# Patient Record
Sex: FEMALE | Race: WHITE | NOT HISPANIC OR LATINO | Employment: OTHER | URBAN - METROPOLITAN AREA
[De-identification: names, ages, dates, MRNs, and addresses within clinical notes are randomized per-mention and may not be internally consistent; named-entity substitution may affect disease eponyms.]

---

## 2022-11-23 ENCOUNTER — PATIENT ROUNDING (BHMG ONLY) (OUTPATIENT)
Dept: FAMILY MEDICINE CLINIC | Facility: CLINIC | Age: 62
End: 2022-11-23

## 2022-11-23 ENCOUNTER — OFFICE VISIT (OUTPATIENT)
Dept: FAMILY MEDICINE CLINIC | Facility: CLINIC | Age: 62
End: 2022-11-23

## 2022-11-23 VITALS
WEIGHT: 195.5 LBS | TEMPERATURE: 98.5 F | SYSTOLIC BLOOD PRESSURE: 108 MMHG | DIASTOLIC BLOOD PRESSURE: 60 MMHG | HEIGHT: 67 IN | BODY MASS INDEX: 30.69 KG/M2 | OXYGEN SATURATION: 97 % | HEART RATE: 95 BPM

## 2022-11-23 DIAGNOSIS — E66.09 CLASS 1 OBESITY DUE TO EXCESS CALORIES WITH SERIOUS COMORBIDITY AND BODY MASS INDEX (BMI) OF 30.0 TO 30.9 IN ADULT: ICD-10-CM

## 2022-11-23 DIAGNOSIS — S76.311A RUPTURE OF RIGHT HAMSTRING TENDON, INITIAL ENCOUNTER: ICD-10-CM

## 2022-11-23 DIAGNOSIS — S46.012A TRAUMATIC COMPLETE TEAR OF LEFT ROTATOR CUFF, INITIAL ENCOUNTER: ICD-10-CM

## 2022-11-23 DIAGNOSIS — Z13.220 LIPID SCREENING: ICD-10-CM

## 2022-11-23 DIAGNOSIS — Z23 NEED FOR INFLUENZA VACCINATION: Primary | ICD-10-CM

## 2022-11-23 DIAGNOSIS — G43.809 OTHER MIGRAINE WITHOUT STATUS MIGRAINOSUS, NOT INTRACTABLE: ICD-10-CM

## 2022-11-23 DIAGNOSIS — G47.09 OTHER INSOMNIA: ICD-10-CM

## 2022-11-23 DIAGNOSIS — E11.9 TYPE 2 DIABETES MELLITUS WITHOUT COMPLICATION, WITHOUT LONG-TERM CURRENT USE OF INSULIN: ICD-10-CM

## 2022-11-23 DIAGNOSIS — Z72.0 TOBACCO ABUSE: ICD-10-CM

## 2022-11-23 DIAGNOSIS — S46.112A LABRAL TEAR OF LONG HEAD OF LEFT BICEPS TENDON, INITIAL ENCOUNTER: ICD-10-CM

## 2022-11-23 DIAGNOSIS — E78.2 MIXED HYPERLIPIDEMIA: ICD-10-CM

## 2022-11-23 DIAGNOSIS — K58.2 IRRITABLE BOWEL SYNDROME WITH BOTH CONSTIPATION AND DIARRHEA: ICD-10-CM

## 2022-11-23 DIAGNOSIS — K21.9 GASTROESOPHAGEAL REFLUX DISEASE WITHOUT ESOPHAGITIS: ICD-10-CM

## 2022-11-23 DIAGNOSIS — Z00.00 PREVENTATIVE HEALTH CARE: ICD-10-CM

## 2022-11-23 DIAGNOSIS — Z83.49 FAMILY HISTORY OF HYPOTHYROIDISM: ICD-10-CM

## 2022-11-23 PROBLEM — S76.319A RUPTURE OF HAMSTRING TENDON: Status: ACTIVE | Noted: 2021-10-21

## 2022-11-23 PROBLEM — E66.811 CLASS 1 OBESITY DUE TO EXCESS CALORIES WITH SERIOUS COMORBIDITY AND BODY MASS INDEX (BMI) OF 30.0 TO 30.9 IN ADULT: Status: ACTIVE | Noted: 2022-11-23

## 2022-11-23 PROCEDURE — 90471 IMMUNIZATION ADMIN: CPT | Performed by: NURSE PRACTITIONER

## 2022-11-23 PROCEDURE — 90686 IIV4 VACC NO PRSV 0.5 ML IM: CPT | Performed by: NURSE PRACTITIONER

## 2022-11-23 PROCEDURE — 99204 OFFICE O/P NEW MOD 45 MIN: CPT | Performed by: NURSE PRACTITIONER

## 2022-11-23 PROCEDURE — T1015 CLINIC SERVICE: HCPCS | Performed by: NURSE PRACTITIONER

## 2022-11-23 RX ORDER — DAPAGLIFLOZIN AND METFORMIN HYDROCHLORIDE 5; 1000 MG/1; MG/1
2 TABLET, FILM COATED, EXTENDED RELEASE ORAL
COMMUNITY
Start: 2022-07-13 | End: 2023-01-27 | Stop reason: SDUPTHER

## 2022-11-23 RX ORDER — SERTRALINE HYDROCHLORIDE 25 MG/1
25 TABLET, FILM COATED ORAL DAILY
COMMUNITY
Start: 2022-07-13 | End: 2023-02-01

## 2022-11-23 RX ORDER — GLIMEPIRIDE 2 MG/1
2 TABLET ORAL DAILY
COMMUNITY
End: 2023-01-16 | Stop reason: SDUPTHER

## 2022-11-23 RX ORDER — SEMAGLUTIDE 1.34 MG/ML
1 INJECTION, SOLUTION SUBCUTANEOUS
COMMUNITY
Start: 2022-07-13 | End: 2023-01-06 | Stop reason: SDUPTHER

## 2022-11-23 RX ORDER — TRAZODONE HYDROCHLORIDE 50 MG/1
TABLET ORAL
Qty: 14 TABLET | Refills: 0 | Status: SHIPPED | OUTPATIENT
Start: 2022-11-23 | End: 2023-02-01

## 2022-11-23 RX ORDER — CONJUGATED ESTROGENS 0.62 MG/G
CREAM VAGINAL
COMMUNITY
End: 2022-11-23

## 2022-11-23 RX ORDER — ATORVASTATIN CALCIUM 20 MG/1
20 TABLET, FILM COATED ORAL DAILY
COMMUNITY
End: 2023-03-30 | Stop reason: SDUPTHER

## 2022-11-23 RX ORDER — PANTOPRAZOLE SODIUM 40 MG/1
40 TABLET, DELAYED RELEASE ORAL DAILY
COMMUNITY
Start: 2022-07-13 | End: 2023-01-16 | Stop reason: SDUPTHER

## 2022-11-23 RX ORDER — TRAMADOL HYDROCHLORIDE 50 MG/1
50 TABLET ORAL
COMMUNITY
Start: 2022-07-13 | End: 2023-02-07 | Stop reason: SDUPTHER

## 2022-11-23 RX ORDER — LISINOPRIL 20 MG/1
20 TABLET ORAL DAILY
COMMUNITY
Start: 2022-07-13 | End: 2022-12-10 | Stop reason: SDUPTHER

## 2022-11-23 RX ORDER — PROMETHAZINE HYDROCHLORIDE 12.5 MG/1
TABLET ORAL
Qty: 60 TABLET | Refills: 2 | Status: SHIPPED | OUTPATIENT
Start: 2022-11-23

## 2022-11-23 RX ORDER — CHOLECALCIFEROL (VITAMIN D3) 25 MCG
TABLET,CHEWABLE ORAL
COMMUNITY

## 2022-11-23 NOTE — PATIENT INSTRUCTIONS
Stop Premarin  Wean off Zoloft as discussed  Bentyl 10 mg 1-4 times a day to regulate IBS  Mammogram/DEXA scan  Trazodone 50 mg 1/2 to 2 tablets as needed insomnia trial  Orthopedic referral  Fasting blood work  Quit smoking  Follow-up 6 months

## 2022-11-23 NOTE — PROGRESS NOTES
Judy Reaves is a 62 y.o. female.     History of Present Illness  62-year-old white female smoker with history of migraines, GERD, partial hysterectomy, diabetes, hyperlipidemia, insomnia, IBS who comes in today to be established as a new patient    Blood pressure 108/60 heart rate 84 with moderate systolic murmur she denies any chest pain, dyspnea, tachycardia or dizziness.  She has no history of heart disease she is aware of but her father did die of an MI in his 70s.    Patient has left rotator and bicep tears it that she is having quite a bit of pain from I am referring her to orthopedic surgery that she wants to have done after first of the year.  She also has issues with her right thigh and has a hamstring tear as well resulting from a fall.  She is can address both these issues with orthopedic surgeon    Patient does not sleep well she has tried Benadryl but it makes her too drowsy the day to try  after.  I Nemo place her on a trial of trazodone    Patient has been on Premarin cream for vaginal dryness we will try to go off of that.  I did tell her of compounding pharmacy in [place] if she wants to go back on something that is a little safer for vaginal dryness to let me know    Patient states blood sugars are normally less than 120 in the morning we will be doing fasting labs    Patient lost her  several years ago and was placed on Zoloft at that time however she does not think she needs it anymore and she is on a low-dose of 25 mg.  We are going to try to wean off the Zoloft    Patient does have IBS has either constipation or diarrhea unguinal place her on some Bentyl to see if we can get her bowels regulated better    Weight is 196 with a BMI of 30.6.  She is up-to-date on COVID vaccines and eye exams.  I am scheduling her mammogram and DEXA scan at [place].  She had a colonoscopy 3 years ago at [facility] that is due again next year in October 2023.  Patient had hysterectomy and longer does Pap  "smears          Stop Premarin  Wean off Zoloft as discussed  Bentyl 10 mg 1-4 times a day to regulate IBS  Mammogram/DEXA scan  Trazodone 50 mg 1/2 to 2 tablets as needed insomnia trial  Orthopedic referral  Fasting blood work  Quit smoking  Follow-up 6 months           The following portions of the patient's history were reviewed and updated as appropriate: allergies, current medications, past family history, past medical history, past social history, past surgical history and problem list.    Vitals:    11/23/22 0931   BP: 108/60   BP Location: Right arm   Patient Position: Sitting   Cuff Size: Adult   Pulse: 95   Temp: 98.5 °F (36.9 °C)   TempSrc: Oral   SpO2: 97%   Weight: 88.7 kg (195 lb 8 oz)   Height: 170.2 cm (67\")     Body mass index is 30.62 kg/m².    Past Medical History:   Diagnosis Date   • Arthritis    • Diabetes mellitus (HCC)    • GERD (gastroesophageal reflux disease)    • Headache    • Hyperlipidemia    • Hypertension    • Murmur      Past Surgical History:   Procedure Laterality Date   • SUBTOTAL HYSTERECTOMY       Family History   Problem Relation Age of Onset   • Hypertension Mother    • Heart disease Mother    • Diabetes Father    • No Known Problems Sister    • No Known Problems Sister    • No Known Problems Sister    • No Known Problems Brother    • No Known Problems Brother    • No Known Problems Brother    • No Known Problems Brother    • No Known Problems Son      Immunization History   Administered Date(s) Administered   • COVID-19 (PFIZER) PURPLE CAP 01/08/2021, 01/29/2021   • Covid-19 (Pfizer) Gray Cap 04/06/2022   • FluLaval/Fluzone >6mos 10/11/2017, 09/21/2018, 09/27/2019, 10/02/2020, 10/13/2021, 11/23/2022   • Influenza, Unspecified 09/27/2019, 10/02/2020, 10/13/2021   • Pneumococcal Polysaccharide (PPSV23) 12/12/2017   • Tdap 07/20/2012, 04/19/2013       No results found for any previous visit.         Review of Systems   Constitutional: Negative.    HENT: Negative.    Respiratory: " Negative.    Cardiovascular: Negative.    Gastrointestinal: Positive for constipation and diarrhea.   Genitourinary:        Vaginal dryness   Musculoskeletal:        Left shoulder and arm pain  Right upper leg pain   Skin: Negative.    Neurological: Negative.    Psychiatric/Behavioral: Negative.        Objective   Physical Exam  Constitutional:       Appearance: Normal appearance.   HENT:      Head: Normocephalic.   Cardiovascular:      Rate and Rhythm: Normal rate and regular rhythm.      Pulses: Normal pulses.      Heart sounds: Normal heart sounds.   Pulmonary:      Effort: Pulmonary effort is normal.      Breath sounds: Normal breath sounds.   Abdominal:      General: Bowel sounds are normal.   Musculoskeletal:      Comments: Limited movement in the left shoulder   Skin:     General: Skin is warm and dry.   Neurological:      General: No focal deficit present.      Mental Status: She is alert and oriented to person, place, and time.   Psychiatric:         Mood and Affect: Mood normal.         Behavior: Behavior normal.         Procedures    Assessment & Plan   Diagnoses and all orders for this visit:    1. Need for influenza vaccination (Primary)  -     FluLaval/Fluarix/Fluzone >6 Months    2. Preventative health care  -     CBC & Differential; Future  -     Comprehensive Metabolic Panel; Future  -     Hemoglobin A1c; Future    3. Lipid screening  -     Lipid Panel With LDL / HDL Ratio; Future    4. Family history of hypothyroidism  -     TSH+Free T4; Future  -     T3; Future    5. Mixed hyperlipidemia    6. Type 2 diabetes mellitus without complication, without long-term current use of insulin (HCC)    7. Gastroesophageal reflux disease without esophagitis    8. Other migraine without status migrainosus, not intractable    9. Rupture of right hamstring tendon, initial encounter  -     Ambulatory Referral to Orthopedic Surgery    10. Traumatic complete tear of left rotator cuff, initial encounter  -      Ambulatory Referral to Orthopedic Surgery    11. Labral tear of long head of left biceps tendon, initial encounter  -     Ambulatory Referral to Orthopedic Surgery    12. Tobacco abuse    13. Class 1 obesity due to excess calories with serious comorbidity and body mass index (BMI) of 30.0 to 30.9 in adult    14. Other insomnia    15. Irritable bowel syndrome with both constipation and diarrhea    Other orders  -     promethazine (PHENERGAN) 12.5 MG tablet; 1-2 q6h prn  Dispense: 60 tablet; Refill: 2  -     traZODone (DESYREL) 50 MG tablet; 1/2 - 2 tabs 30 minutes before bedtime  Dispense: 14 tablet; Refill: 0  -     glucose blood test strip; Check blood sugar tid  Dispense: 100 each; Refill: 12          Current Outpatient Medications:   •  atorvastatin (LIPITOR) 20 MG tablet, Take 20 mg by mouth Daily., Disp: , Rfl:   •  Cyanocobalamin (B-12) 1000 MCG capsule, Take  by mouth., Disp: , Rfl:   •  dapagliflozin-metformin HCl ER (Xigduo XR) 5-1000 MG tablet, Take 2 tablets by mouth Daily With Breakfast., Disp: , Rfl:   •  glimepiride (AMARYL) 2 MG tablet, Take 2 mg by mouth Daily., Disp: , Rfl:   •  lisinopril (PRINIVIL,ZESTRIL) 20 MG tablet, Take 20 mg by mouth Daily., Disp: , Rfl:   •  pantoprazole (PROTONIX) 40 MG EC tablet, Take 40 mg by mouth Daily., Disp: , Rfl:   •  Semaglutide, 1 MG/DOSE, (Ozempic, 1 MG/DOSE,) 4 MG/3ML solution pen-injector, Inject 1 mg under the skin into the appropriate area as directed Every 7 (Seven) Days., Disp: , Rfl:   •  sertraline (ZOLOFT) 25 MG tablet, Take 25 mg by mouth Daily., Disp: , Rfl:   •  traMADol (ULTRAM) 50 MG tablet, Take 50 mg by mouth. prn, Disp: , Rfl:   •  glucose blood test strip, Check blood sugar tid, Disp: 100 each, Rfl: 12  •  promethazine (PHENERGAN) 12.5 MG tablet, 1-2 q6h prn, Disp: 60 tablet, Rfl: 2  •  traZODone (DESYREL) 50 MG tablet, 1/2 - 2 tabs 30 minutes before bedtime, Disp: 14 tablet, Rfl: 0           Debbie Phillips, APRN 11/23/2022 10:29 EST  This note  has been electronically signed

## 2022-11-23 NOTE — PROGRESS NOTES
November 23, 2022    Hello, may I speak with Judy Reaves?    My name is Eli Cifuentes      I am  with CHRISTO BOYKIN  [facility]  [address] [facility] [address]  [place] [address].    Before we get started may I verify your date of birth? 1960    I am calling to officially welcome you to our practice and ask about your recent visit. Is this a good time to talk? yes    Tell me about your visit with us. What things went well?  Everything fantastic. Everyone really nice and took care of everything.       We're always looking for ways to make our patients' experiences even better. Do you have recommendations on ways we may improve?  no    Overall were you satisfied with your first visit to our practice? yes       I appreciate you taking the time to speak with me today. Is there anything else I can do for you? no      Thank you, and have a great day.

## 2022-12-01 ENCOUNTER — TELEPHONE (OUTPATIENT)
Dept: FAMILY MEDICINE CLINIC | Facility: CLINIC | Age: 62
End: 2022-12-01

## 2022-12-01 NOTE — TELEPHONE ENCOUNTER
Incoming Refill Request      Medication requested (name and dose):   glucose blood test strip   12 ordered         Pharmacy where request should be sent: [facility]/pharm[facility] #[id number] - [place], [place] - [address] - [phone]  - [phone]   [phone]    Additional details provided by patient: PATIENT CALLED AND SAID SHE IS OUT OF MEDICATION AND HAS BEEN FOR A WEEK     Best call back number: [phone]    Does the patient have less than a 3 day supply:  [x] Yes  [] No    Staci Monteiro Rep  12/01/22, 09:23 EST

## 2022-12-05 ENCOUNTER — TELEPHONE (OUTPATIENT)
Dept: FAMILY MEDICINE CLINIC | Facility: CLINIC | Age: 62
End: 2022-12-05

## 2022-12-05 RX ORDER — DIPHENHYDRAMINE HYDROCHLORIDE 25 MG/1
CAPSULE, LIQUID FILLED ORAL
Qty: 1 EACH | Refills: 0 | Status: SHIPPED | OUTPATIENT
Start: 2022-12-05

## 2022-12-05 NOTE — TELEPHONE ENCOUNTER
Caller: Judy Reaves    Relationship: Self    Best call back number: [phone]     What medication are you requesting: ACCUCHEK TEST GUIDE STRIPS    ACCUCHEK GUIDE ME METER    What are your current symptoms: DIABETES    How long have you been experiencing symptoms: ONGOING    Have you had these symptoms before:    [x] Yes  [] No    Have you been treated for these symptoms before:   [x] Yes  [] No    If a prescription is needed, what is your preferred pharmacy and phone number: [id number] - [place], [place] - [address] [facility] - [phone] [facility] [phone] FX     Additional notes: INSURANCE WOULD NOT COVER PREVIOUS TEST STRIPS, ALSO NEEDS TO METER TO GO WITH TEST STRIPS

## 2022-12-12 RX ORDER — LISINOPRIL 20 MG/1
20 TABLET ORAL DAILY
Qty: 90 TABLET | Refills: 0 | Status: SHIPPED | OUTPATIENT
Start: 2022-12-12 | End: 2023-03-06

## 2023-01-05 ENCOUNTER — OFFICE VISIT (OUTPATIENT)
Dept: ORTHOPEDIC SURGERY | Facility: CLINIC | Age: 63
End: 2023-01-05
Payer: MEDICAID

## 2023-01-05 ENCOUNTER — PREP FOR SURGERY (OUTPATIENT)
Dept: OTHER | Facility: HOSPITAL | Age: 63
End: 2023-01-05
Payer: MEDICAID

## 2023-01-05 VITALS — WEIGHT: 195 LBS | OXYGEN SATURATION: 97 % | BODY MASS INDEX: 30.61 KG/M2 | HEIGHT: 67 IN

## 2023-01-05 DIAGNOSIS — M19.012 OSTEOARTHRITIS OF LEFT GLENOHUMERAL JOINT: Primary | ICD-10-CM

## 2023-01-05 PROCEDURE — 99204 OFFICE O/P NEW MOD 45 MIN: CPT | Performed by: ORTHOPAEDIC SURGERY

## 2023-01-05 RX ORDER — TRANEXAMIC ACID 10 MG/ML
1000 INJECTION, SOLUTION INTRAVENOUS ONCE
Status: CANCELLED | OUTPATIENT
Start: 2023-01-05 | End: 2023-01-05

## 2023-01-05 RX ORDER — MELOXICAM 15 MG/1
15 TABLET ORAL ONCE
Status: CANCELLED | OUTPATIENT
Start: 2023-01-05 | End: 2023-01-05

## 2023-01-05 RX ORDER — PREGABALIN 75 MG/1
150 CAPSULE ORAL ONCE
Status: CANCELLED | OUTPATIENT
Start: 2023-01-05 | End: 2023-01-05

## 2023-01-05 RX ORDER — OXYCODONE HCL 10 MG/1
10 TABLET, FILM COATED, EXTENDED RELEASE ORAL ONCE
Status: CANCELLED | OUTPATIENT
Start: 2023-01-05 | End: 2023-01-05

## 2023-01-05 NOTE — PROGRESS NOTES
Patient ID: Judy Reaves is a 62 y.o. female.    Chief Complaint:    Chief Complaint   Patient presents with   • Left Shoulder - Initial Evaluation, Pain     Pain 5/10       HPI:  This is a 62-year-old female here with longstanding left shoulder pain from Debbie Phillips.  She has had pain deep in the shoulder problems lifting.  She has had treatment with rehab exercises and medication without significant relief  Past Medical History:   Diagnosis Date   • Arthritis    • Diabetes mellitus (HCC)    • GERD (gastroesophageal reflux disease)    • Headache    • Hyperlipidemia    • Hypertension    • Murmur        Past Surgical History:   Procedure Laterality Date   • SUBTOTAL HYSTERECTOMY         Family History   Problem Relation Age of Onset   • Hypertension Mother    • Heart disease Mother    • Diabetes Father    • No Known Problems Sister    • No Known Problems Sister    • No Known Problems Sister    • No Known Problems Brother    • No Known Problems Brother    • No Known Problems Brother    • No Known Problems Brother    • No Known Problems Son           Social History     Occupational History   • Not on file   Tobacco Use   • Smoking status: Every Day     Types: Cigarettes   • Smokeless tobacco: Never   Vaping Use   • Vaping Use: Never used   Substance and Sexual Activity   • Alcohol use: Not Currently   • Drug use: Defer   • Sexual activity: Defer      Review of Systems   Cardiovascular: Negative for chest pain.   Musculoskeletal: Positive for arthralgias.       Objective:    Ht 170.2 cm (67\")   Wt 88.5 kg (195 lb)   SpO2 97%   BMI 30.54 kg/m²     Physical Examination:  Left shoulder intact skin mild pain over the bicep groove and impingement area passive elevation 160 abduction 140 external patient 50 internal rotation L5.  She has pain and weakness on [place], [place], supraspinatus testing.  Belly press and lift off are 4/5 and 3/5.  Active elevation is 120 degrees  Sensory and motor exam are intact all  distributions. Radial pulse is palpable and capillary refill is less than two seconds to all digits.    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 Issues of scapular notching and component revision were discussed. Questions were answered and addressed.  Postop sling dispensed  Greater than 15 minutes was spent demonstrating proper fit and use of the device and signs to monitor for complications      Procedures         Disclaimer: Part of this note may be an electronic transcription/translation of spoken language to printed text using the Dragon Dictation System

## 2023-01-06 ENCOUNTER — PATIENT ROUNDING (BHMG ONLY) (OUTPATIENT)
Dept: ORTHOPEDIC SURGERY | Facility: CLINIC | Age: 63
End: 2023-01-06
Payer: MEDICAID

## 2023-01-06 ENCOUNTER — TELEPHONE (OUTPATIENT)
Dept: FAMILY MEDICINE CLINIC | Facility: CLINIC | Age: 63
End: 2023-01-06
Payer: MEDICAID

## 2023-01-06 RX ORDER — PEN NEEDLE, DIABETIC 30 GX3/16"
1 NEEDLE, DISPOSABLE MISCELLANEOUS TAKE AS DIRECTED
Qty: 30 EACH | Refills: 0 | Status: SHIPPED | OUTPATIENT
Start: 2023-01-06 | End: 2023-02-02

## 2023-01-06 RX ORDER — SEMAGLUTIDE 1.34 MG/ML
1 INJECTION, SOLUTION SUBCUTANEOUS
Qty: 3 ML | Refills: 2 | Status: SHIPPED | OUTPATIENT
Start: 2023-01-06 | End: 2023-04-06

## 2023-01-06 NOTE — TELEPHONE ENCOUNTER
----- Message from Judy Reaves sent at 1/6/2023 12:53 PM EST -----  Regarding: Ozempic  Contact: [phone]  Could you send in a prescription for the Ozempic for me   I only have one dose left but no needle left because the needle bent on me do they have a prescription for extra needles for it also  Thank you  Judy Reaves

## 2023-01-06 NOTE — PROGRESS NOTES
A my chart message has been sent to the patient for PATIENT ROUNDING with [facility]

## 2023-01-06 NOTE — PROGRESS NOTES
A my chart message has been sent to the patient for PATIENT ROUNDING with [facility]

## 2023-01-16 RX ORDER — ERGOCALCIFEROL 1.25 MG/1
50000 CAPSULE ORAL WEEKLY
Qty: 12 CAPSULE | Refills: 1 | Status: SHIPPED | OUTPATIENT
Start: 2023-01-16

## 2023-01-16 RX ORDER — GLIMEPIRIDE 4 MG/1
4 TABLET ORAL DAILY
Qty: 90 TABLET | Refills: 1 | Status: SHIPPED | OUTPATIENT
Start: 2023-01-16

## 2023-01-16 RX ORDER — ERGOCALCIFEROL 1.25 MG/1
50000 CAPSULE ORAL WEEKLY
COMMUNITY
End: 2023-01-16 | Stop reason: SDUPTHER

## 2023-01-16 RX ORDER — GLIMEPIRIDE 4 MG/1
1 TABLET ORAL DAILY
COMMUNITY
Start: 2023-01-12 | End: 2023-01-16 | Stop reason: SDUPTHER

## 2023-01-16 RX ORDER — PANTOPRAZOLE SODIUM 40 MG/1
40 TABLET, DELAYED RELEASE ORAL DAILY
Qty: 90 TABLET | Refills: 1 | Status: SHIPPED | OUTPATIENT
Start: 2023-01-16

## 2023-01-16 NOTE — TELEPHONE ENCOUNTER
----- Message from Mary Lou Lopez MA sent at 1/16/2023  7:56 AM EST -----  Regarding: FW: Ludaic  Contact: [phone]    ----- Message -----  From: Judy Koehler  Sent: 1/15/2023  12:15 PM EST  To: Domonique Cooper [facility]  Subject: Ozempic                                          I need a refill on glimepiride 4mg and ergocalciferol 1.25 mg which I have been taking once a week if you want me to continue on them and the pantoprazole 40 mg   I know we talked about discontinuing them and I tried but if I don't take at least one every other day my stomach is upset and I have heartburn and acid reflux  Thank you  Judy Koehler

## 2023-01-27 ENCOUNTER — TELEPHONE (OUTPATIENT)
Dept: FAMILY MEDICINE CLINIC | Facility: CLINIC | Age: 63
End: 2023-01-27
Payer: MEDICAID

## 2023-01-27 RX ORDER — DAPAGLIFLOZIN AND METFORMIN HYDROCHLORIDE 5; 1000 MG/1; MG/1
2 TABLET, FILM COATED, EXTENDED RELEASE ORAL
Qty: 30 TABLET | Refills: 2 | Status: SHIPPED | OUTPATIENT
Start: 2023-01-27 | End: 2023-02-01

## 2023-01-27 NOTE — TELEPHONE ENCOUNTER
----- Message from Judy Koehler sent at 1/27/2023  7:23 AM EST -----  Regarding: Marielos  Contact: [phone]  I am out of the [facility] please send in a refill for me if she wants me to stay on it  Thank you  Judy Koehler

## 2023-01-30 ENCOUNTER — TELEPHONE (OUTPATIENT)
Dept: FAMILY MEDICINE CLINIC | Facility: CLINIC | Age: 63
End: 2023-01-30
Payer: MEDICAID

## 2023-01-30 NOTE — TELEPHONE ENCOUNTER
Caller: Judy Koehler    Relationship: Self    Best call back number: [phone]      PATIENT'S INSURANCE IS REQUIRING A PRIOR AUTHORIZATION ON HER PRESCRIPTION : XIGDUO     SHE IS COMPLETELY OUT AS OF TODAY.      THE PHARMACY PROVIDED HER 3 DAYS WORTH ON THIS MEDICATION DUE TO HER NEEDING IT AUTHORIZED.      PLEASE ADVISE

## 2023-01-31 ENCOUNTER — TELEPHONE (OUTPATIENT)
Dept: FAMILY MEDICINE CLINIC | Facility: CLINIC | Age: 63
End: 2023-01-31
Payer: MEDICAID

## 2023-01-31 NOTE — TELEPHONE ENCOUNTER
Caller: Judy Koehler    Relationship: Self    Best call back number: [phone]        PATIENT CALLED STATING HER PRIOR AUTHORIZATION FOR: XIGDUO   WAS DENIED.    SHE IS REQUESTING A PEER TO PEER, PER HER INSURANCE COMPANY.    PATIENT IS COMPLETELY OUT OF THIS MEDICATION.    IF THIS CANNOT BE DONE SHE NEEDS US TO CALL HER SO SHE CAN FIGURE OUT AN ALTERNATIVE MEDICATION       PLEASE ADVISE

## 2023-02-01 ENCOUNTER — TELEPHONE (OUTPATIENT)
Dept: FAMILY MEDICINE CLINIC | Facility: CLINIC | Age: 63
End: 2023-02-01

## 2023-02-01 ENCOUNTER — OFFICE VISIT (OUTPATIENT)
Dept: FAMILY MEDICINE CLINIC | Facility: CLINIC | Age: 63
End: 2023-02-01
Payer: MEDICAID

## 2023-02-01 VITALS
SYSTOLIC BLOOD PRESSURE: 104 MMHG | WEIGHT: 190.2 LBS | HEIGHT: 67 IN | BODY MASS INDEX: 29.85 KG/M2 | DIASTOLIC BLOOD PRESSURE: 62 MMHG | OXYGEN SATURATION: 98 % | HEART RATE: 94 BPM | TEMPERATURE: 97.8 F

## 2023-02-01 DIAGNOSIS — E78.2 MIXED HYPERLIPIDEMIA: Primary | ICD-10-CM

## 2023-02-01 DIAGNOSIS — S46.112A LABRAL TEAR OF LONG HEAD OF LEFT BICEPS TENDON, INITIAL ENCOUNTER: ICD-10-CM

## 2023-02-01 DIAGNOSIS — M19.012 OSTEOARTHRITIS OF LEFT SHOULDER, UNSPECIFIED OSTEOARTHRITIS TYPE: ICD-10-CM

## 2023-02-01 DIAGNOSIS — Z78.0 POSTMENOPAUSAL: ICD-10-CM

## 2023-02-01 DIAGNOSIS — G47.09 OTHER INSOMNIA: ICD-10-CM

## 2023-02-01 DIAGNOSIS — E11.9 TYPE 2 DIABETES MELLITUS WITHOUT COMPLICATION, WITHOUT LONG-TERM CURRENT USE OF INSULIN: ICD-10-CM

## 2023-02-01 DIAGNOSIS — Z72.0 TOBACCO ABUSE: ICD-10-CM

## 2023-02-01 DIAGNOSIS — Z12.31 OTHER SCREENING MAMMOGRAM: ICD-10-CM

## 2023-02-01 PROCEDURE — T1015 CLINIC SERVICE: HCPCS | Performed by: NURSE PRACTITIONER

## 2023-02-01 PROCEDURE — 99213 OFFICE O/P EST LOW 20 MIN: CPT | Performed by: NURSE PRACTITIONER

## 2023-02-01 RX ORDER — CETIRIZINE HYDROCHLORIDE 10 MG/1
10 TABLET ORAL DAILY
Qty: 30 TABLET | Refills: 2 | Status: SHIPPED | OUTPATIENT
Start: 2023-02-01 | End: 2023-04-07

## 2023-02-01 RX ORDER — TRIAMCINOLONE ACETONIDE 55 UG/1
2 SPRAY, METERED NASAL DAILY
Qty: 16.5 G | Refills: 2 | Status: SHIPPED | OUTPATIENT
Start: 2023-02-01

## 2023-02-01 RX ORDER — HYDROXYZINE 50 MG/1
50 TABLET, FILM COATED ORAL 3 TIMES DAILY PRN
Qty: 30 TABLET | Refills: 0 | Status: SHIPPED | OUTPATIENT
Start: 2023-02-01 | End: 2023-03-30 | Stop reason: SDUPTHER

## 2023-02-01 RX ORDER — PSEUDOEPHEDRINE HCL 30 MG
30 TABLET ORAL EVERY 4 HOURS PRN
Qty: 60 TABLET | Refills: 2 | Status: SHIPPED | OUTPATIENT
Start: 2023-02-01

## 2023-02-01 NOTE — PROGRESS NOTES
Judy Koehler is a 62 y.o. female.     History of Present Illness  62-year-old white female smoker with history of migraines, GERD, partial hysterectomy, diabetes, hyperlipidemia, insomnia, IBS and chronic left shoulder pain.    Blood pressure 104/62 heart rate 94 with moderate systolic murmur she denies any chest pain, dyspnea, tachycardia or dizziness    Patient has rotator and bicep tears and has surgery scheduled for February 15 at [facility].    On last visit we tried trazodone for sleep but she states it does not work very well.  We will stop the trazodone and I will place her on a trial of hydroxyzine to see if that helps    Patient has weaned off Zoloft and is no longer on the Premarin cream and states she is feeling pretty good.    Patient sugars are staying less than 140 in the morning on current medications would not change anything there.  We will be doing some fasting labs today    Patient complains of persistent coughing at night and rhinorrhea.  On examination patient has allergies and looks like maxillary sinusitis.  I am placing her on allergy medicine and a Z-Rod    Weight is down 5 pounds at 190 since being on the Ozempic.  She is up-to-date on eye exam she has not done a mammogram and DEXA scan yet will reorder.  Her colonoscopy is due in October 2023          Z-Rod  Allergy medication as directed  Mammogram/DEXA scan at [facility]  Hydroxyzine 50 mg 1 to 230 minutes before bedtime  Fasting blood work  Keep appointment for preop labs  Follow-up postop surgery          Diabetes  She has type 2 diabetes mellitus. No MedicAlert identification noted. The initial diagnosis of diabetes was made 20 Years ago. Hypoglycemia symptoms include sleepiness. Pertinent negatives for hypoglycemia include no hunger, mood changes, nervousness/anxiousness, pallor or sweats. Associated symptoms include foot paresthesias. Pertinent negatives for diabetes include no foot ulcerations and no visual change.  "Pertinent negatives for hypoglycemia complications include no blackouts, no hospitalization, no nocturnal hypoglycemia, no required assistance and no required glucagon injection. Pertinent negatives for diabetic complications include no CVA, heart disease, impotence, nephropathy, peripheral neuropathy, PVD or retinopathy. Risk factors for coronary artery disease include dyslipidemia, family history, hypertension, obesity and tobacco exposure. Current diabetic treatment includes oral agent (triple therapy). She is compliant with treatment most of the time. She is currently taking insulin pre-breakfast. She is following a generally healthy diet. Meal planning includes avoidance of concentrated sweets. She has not had a previous visit with a dietitian. She participates in exercise intermittently. She monitors blood glucose at home 1-2 x per day. Blood glucose monitoring compliance is adequate. Her home blood glucose trend is fluctuating minimally. Her breakfast blood glucose range is generally 110-130 mg/dl. Her highest blood glucose is 140-180 mg/dl. Her overall blood glucose range is 110-130 mg/dl. She does not see a podiatrist.Eye exam is current.        The following portions of the patient's history were reviewed and updated as appropriate: allergies, current medications, past family history, past medical history, past social history, past surgical history and problem list.    Vitals:    02/01/23 0916   BP: 104/62   BP Location: Right arm   Patient Position: Sitting   Cuff Size: Adult   Pulse: 94   Temp: 97.8 °F (36.6 °C)   TempSrc: Oral   SpO2: 98%   Weight: 86.3 kg (190 lb 3.2 oz)   Height: 170.2 cm (67.01\")     Body mass index is 29.78 kg/m².    Past Medical History:   Diagnosis Date   • Arthritis    • Diabetes mellitus (HCC)    • GERD (gastroesophageal reflux disease)    • Headache    • Hyperlipidemia    • Hypertension    • Murmur      Past Surgical History:   Procedure Laterality Date   • SUBTOTAL " HYSTERECTOMY       Family History   Problem Relation Age of Onset   • Hypertension Mother    • Heart disease Mother    • Diabetes Father    • No Known Problems Sister    • No Known Problems Sister    • No Known Problems Sister    • No Known Problems Brother    • No Known Problems Brother    • No Known Problems Brother    • No Known Problems Brother    • No Known Problems Son      Immunization History   Administered Date(s) Administered   • COVID-19 (PFIZER) PURPLE CAP 01/08/2021, 01/29/2021   • Covid-19 (Pfizer) Gray Cap 04/06/2022   • FluLaval/Fluzone >6mos 10/11/2017, 09/21/2018, 09/27/2019, 10/02/2020, 10/13/2021, 11/23/2022   • Influenza, Unspecified 09/27/2019, 10/02/2020, 10/13/2021   • Pneumococcal Polysaccharide (PPSV23) 12/12/2017   • Tdap 07/20/2012, 04/19/2013       Results Encounter on 11/28/2022   Component Date Value Ref Range Status   • WBC 11/29/2022 8.9  3.4 - 10.8 x10E3/uL Final   • RBC 11/29/2022 5.56 (H)  3.77 - 5.28 x10E6/uL Final   • Hemoglobin 11/29/2022 16.0 (H)  11.1 - 15.9 g/dL Final   • Hematocrit 11/29/2022 48.2 (H)  34.0 - 46.6 % Final   • MCV 11/29/2022 87  79 - 97 fL Final   • MCH 11/29/2022 28.8  26.6 - 33.0 pg Final   • MCHC 11/29/2022 33.2  31.5 - 35.7 g/dL Final   • RDW 11/29/2022 13.2  11.7 - 15.4 % Final   • Platelets 11/29/2022 318  150 - 450 x10E3/uL Final   • Neutrophil Rel % 11/29/2022 58  Not Estab. % Final   • Lymphocyte Rel % 11/29/2022 35  Not Estab. % Final   • Monocyte Rel % 11/29/2022 5  Not Estab. % Final   • Eosinophil Rel % 11/29/2022 1  Not Estab. % Final   • Basophil Rel % 11/29/2022 1  Not Estab. % Final   • Neutrophils Absolute 11/29/2022 5.2  1.4 - 7.0 x10E3/uL Final   • Lymphocytes Absolute 11/29/2022 3.1  0.7 - 3.1 x10E3/uL Final   • Monocytes Absolute 11/29/2022 0.4  0.1 - 0.9 x10E3/uL Final   • Eosinophils Absolute 11/29/2022 0.1  0.0 - 0.4 x10E3/uL Final   • Basophils Absolute 11/29/2022 0.1  0.0 - 0.2 x10E3/uL Final   • Immature Granulocyte Rel %  11/29/2022 0  Not Estab. % Final   • Immature Grans Absolute 11/29/2022 0.0  0.0 - 0.1 x10E3/uL Final   • Glucose 11/29/2022 129 (H)  70 - 99 mg/dL Final   • BUN 11/29/2022 15  8 - 27 mg/dL Final   • Creatinine 11/29/2022 0.88  0.57 - 1.00 mg/dL Final   • EGFR Result 11/29/2022 74  >59 mL/min/1.73 Final   • BUN/Creatinine Ratio 11/29/2022 17  12 - 28 Final   • Sodium 11/29/2022 137  134 - 144 mmol/L Final   • Potassium 11/29/2022 4.8  3.5 - 5.2 mmol/L Final   • Chloride 11/29/2022 99  96 - 106 mmol/L Final   • Total CO2 11/29/2022 23  20 - 29 mmol/L Final   • Calcium 11/29/2022 9.3  8.7 - 10.3 mg/dL Final   • Total Protein 11/29/2022 6.3  6.0 - 8.5 g/dL Final   • Albumin 11/29/2022 4.1  3.8 - 4.8 g/dL Final   • Globulin 11/29/2022 2.2  1.5 - 4.5 g/dL Final   • A/G Ratio 11/29/2022 1.9  1.2 - 2.2 Final   • Total Bilirubin 11/29/2022 0.4  0.0 - 1.2 mg/dL Final   • Alkaline Phosphatase 11/29/2022 121  44 - 121 IU/L Final   • AST (SGOT) 11/29/2022 13  0 - 40 IU/L Final   • ALT (SGPT) 11/29/2022 14  0 - 32 IU/L Final   • Total Cholesterol 11/29/2022 127  100 - 199 mg/dL Final   • Triglycerides 11/29/2022 100  0 - 149 mg/dL Final   • HDL Cholesterol 11/29/2022 45  >39 mg/dL Final   • VLDL Cholesterol Tito 11/29/2022 19  5 - 40 mg/dL Final   • LDL Chol Calc (NIH) 11/29/2022 63  0 - 99 mg/dL Final   • LDL/HDL RATIO 11/29/2022 1.4  0.0 - 3.2 ratio Final    Comment:                                     LDL/HDL Ratio                                              Men  Women                                1/2 Avg.Risk  1.0    1.5                                    Avg.Risk  3.6    3.2                                 2X Avg.Risk  6.2    5.0                                 3X Avg.Risk  8.0    6.1     • Hemoglobin A1C 11/29/2022 6.3 (H)  4.8 - 5.6 % Final    Comment:          Prediabetes: 5.7 - 6.4           Diabetes: >6.4           Glycemic control for adults with diabetes: <7.0     • TSH 11/29/2022 1.980  0.450 - 4.500 uIU/mL Final    • Free T4 11/29/2022 1.30  0.82 - 1.77 ng/dL Final   • T3, Total 11/29/2022 131  71 - 180 ng/dL Final         Review of Systems   Constitutional: Negative.    Respiratory: Negative.    Cardiovascular: Negative.    Gastrointestinal: Negative.    Genitourinary: Negative.  Negative for impotence.   Musculoskeletal:        Shoulder pain   Skin: Negative for pallor.   Neurological: Negative.    Psychiatric/Behavioral: Positive for sleep disturbance. The patient is not nervous/anxious.        Objective   Physical Exam  Constitutional:       Appearance: Normal appearance.   HENT:      Head: Normocephalic.   Cardiovascular:      Rate and Rhythm: Normal rate and regular rhythm.      Pulses: Normal pulses.      Heart sounds: Normal heart sounds.   Pulmonary:      Effort: Pulmonary effort is normal.      Breath sounds: Normal breath sounds.   Abdominal:      General: Bowel sounds are normal.   Musculoskeletal:      Comments: Limited range of motion on shoulder until surgery in 2 weeks   Skin:     General: Skin is warm and dry.   Neurological:      General: No focal deficit present.      Mental Status: She is alert and oriented to person, place, and time.   Psychiatric:         Mood and Affect: Mood normal.         Behavior: Behavior normal.         Procedures    Assessment & Plan   Diagnoses and all orders for this visit:    1. Mixed hyperlipidemia (Primary)  -     Lipid Panel With LDL / HDL Ratio    2. Type 2 diabetes mellitus without complication, without long-term current use of insulin (HCC)  -     Comprehensive Metabolic Panel    3. Postmenopausal  -     DEXA Bone Density Axial; Future    4. Other screening mammogram  -     Mammo Screening Digital Tomosynthesis Bilateral With CAD; Future    5. Labral tear of long head of left biceps tendon, initial encounter    6. Osteoarthritis of left shoulder, unspecified osteoarthritis type    7. Tobacco abuse    8. Other insomnia    Other orders  -     hydrOXYzine (ATARAX) 50 MG  tablet; Take 1 tablet by mouth 3 (Three) Times a Day As Needed for Itching.  Dispense: 30 tablet; Refill: 0  -     cetirizine (ZyrTEC Allergy) 10 MG tablet; Take 1 tablet by mouth Daily.  Dispense: 30 tablet; Refill: 2  -     pseudoephedrine (Sudafed) 30 MG tablet; Take 1 tablet by mouth Every 4 (Four) Hours As Needed for Congestion.  Dispense: 60 tablet; Refill: 2  -     Triamcinolone Acetonide (Nasacort Allergy 24HR) 55 MCG/ACT nasal inhaler; 2 sprays into the nostril(s) as directed by provider Daily.  Dispense: 16.5 g; Refill: 2          Current Outpatient Medications:   •  atorvastatin (LIPITOR) 20 MG tablet, Take 20 mg by mouth Daily., Disp: , Rfl:   •  Blood Glucose Monitoring Suppl (Blood Glucose Monitor System) w/Device kit, Check blood sugars tid, Disp: 1 each, Rfl: 0  •  Cyanocobalamin (B-12) 1000 MCG capsule, Take  by mouth., Disp: , Rfl:   •  glimepiride (AMARYL) 4 MG tablet, Take 1 tablet by mouth Daily., Disp: 90 tablet, Rfl: 1  •  glucose blood test strip, Check blood sugar tid, Disp: 100 each, Rfl: 12  •  Insulin Pen Needle (Pen Needles) 31G X 5 MM misc, 1 each Take As Directed., Disp: 30 each, Rfl: 0  •  lisinopril (PRINIVIL,ZESTRIL) 20 MG tablet, Take 1 tablet by mouth Daily., Disp: 90 tablet, Rfl: 0  •  pantoprazole (PROTONIX) 40 MG EC tablet, Take 1 tablet by mouth Daily., Disp: 90 tablet, Rfl: 1  •  promethazine (PHENERGAN) 12.5 MG tablet, 1-2 q6h prn, Disp: 60 tablet, Rfl: 2  •  Semaglutide, 1 MG/DOSE, (Ozempic, 1 MG/DOSE,) 4 MG/3ML solution pen-injector, Inject 1 mg under the skin into the appropriate area as directed Every 7 (Seven) Days., Disp: 3 mL, Rfl: 2  •  traMADol (ULTRAM) 50 MG tablet, Take 50 mg by mouth. prn, Disp: , Rfl:   •  vitamin D (ERGOCALCIFEROL) 1.25 MG (34370 UT) capsule capsule, Take 1 capsule by mouth 1 (One) Time Per Week., Disp: 12 capsule, Rfl: 1  •  cetirizine (ZyrTEC Allergy) 10 MG tablet, Take 1 tablet by mouth Daily., Disp: 30 tablet, Rfl: 2  •  hydrOXYzine  (ATARAX) 50 MG tablet, Take 1 tablet by mouth 3 (Three) Times a Day As Needed for Itching., Disp: 30 tablet, Rfl: 0  •  pseudoephedrine (Sudafed) 30 MG tablet, Take 1 tablet by mouth Every 4 (Four) Hours As Needed for Congestion., Disp: 60 tablet, Rfl: 2  •  Triamcinolone Acetonide (Nasacort Allergy 24HR) 55 MCG/ACT nasal inhaler, 2 sprays into the nostril(s) as directed by provider Daily., Disp: 16.5 g, Rfl: 2           Debbie Phillips, APRN 2/1/2023 09:50 EST  This note has been electronically signed

## 2023-02-01 NOTE — TELEPHONE ENCOUNTER
Pt was supposed to have a z pack sent in today following her appt.  I do not see where it has been sent.  Please advise.

## 2023-02-01 NOTE — PATIENT INSTRUCTIONS
Z-Rod  Allergy medication as directed  Mammogram/DEXA scan at [facility]  Hydroxyzine 50 mg 1 to 230 minutes before bedtime  Fasting blood work  Keep appointment for preop labs  Follow-up postop surgery

## 2023-02-02 LAB
ALBUMIN SERPL-MCNC: 4.2 G/DL (ref 3.8–4.8)
ALBUMIN/GLOB SERPL: 1.6 {RATIO} (ref 1.2–2.2)
ALP SERPL-CCNC: 121 IU/L (ref 44–121)
ALT SERPL-CCNC: 13 IU/L (ref 0–32)
AST SERPL-CCNC: 16 IU/L (ref 0–40)
BILIRUB SERPL-MCNC: 0.4 MG/DL (ref 0–1.2)
BUN SERPL-MCNC: 16 MG/DL (ref 8–27)
BUN/CREAT SERPL: 15 (ref 12–28)
CALCIUM SERPL-MCNC: 9.4 MG/DL (ref 8.7–10.3)
CHLORIDE SERPL-SCNC: 101 MMOL/L (ref 96–106)
CHOLEST SERPL-MCNC: 132 MG/DL (ref 100–199)
CO2 SERPL-SCNC: 25 MMOL/L (ref 20–29)
CREAT SERPL-MCNC: 1.08 MG/DL (ref 0.57–1)
EGFRCR SERPLBLD CKD-EPI 2021: 58 ML/MIN/1.73
GLOBULIN SER CALC-MCNC: 2.7 G/DL (ref 1.5–4.5)
GLUCOSE SERPL-MCNC: 126 MG/DL (ref 70–99)
HDLC SERPL-MCNC: 47 MG/DL
LDLC SERPL CALC-MCNC: 67 MG/DL (ref 0–99)
LDLC/HDLC SERPL: 1.4 RATIO (ref 0–3.2)
POTASSIUM SERPL-SCNC: 5 MMOL/L (ref 3.5–5.2)
PROT SERPL-MCNC: 6.9 G/DL (ref 6–8.5)
SODIUM SERPL-SCNC: 139 MMOL/L (ref 134–144)
TRIGL SERPL-MCNC: 95 MG/DL (ref 0–149)
VLDLC SERPL CALC-MCNC: 18 MG/DL (ref 5–40)

## 2023-02-02 RX ORDER — FLURBIPROFEN SODIUM 0.3 MG/ML
SOLUTION/ DROPS OPHTHALMIC
Qty: 100 EACH | Refills: 6 | Status: SHIPPED | OUTPATIENT
Start: 2023-02-02

## 2023-02-02 RX ORDER — AZITHROMYCIN 250 MG/1
TABLET, FILM COATED ORAL
Qty: 6 TABLET | Refills: 0 | Status: ON HOLD | OUTPATIENT
Start: 2023-02-02 | End: 2023-02-15

## 2023-02-06 ENCOUNTER — TELEPHONE (OUTPATIENT)
Dept: FAMILY MEDICINE CLINIC | Facility: CLINIC | Age: 63
End: 2023-02-06
Payer: MEDICAID

## 2023-02-06 NOTE — TELEPHONE ENCOUNTER
Provider: SALINAS, SHARON    Caller: HARIS LAGUNA     Relationship to Patient: SELF    Phone Number: [phone]    Reason for Call: PATIENT WAS WONDERING IF SHE SHOULD GET BACK ON THE XIGDUO MEDICATION BECAUSE  THE INSURANCE HAS NOW APPROVED THE REFILL.    PLEASE CALL PATIENT TO DISCUSS AND ADVISE

## 2023-02-07 ENCOUNTER — HOSPITAL ENCOUNTER (OUTPATIENT)
Dept: CARDIOLOGY | Facility: HOSPITAL | Age: 63
Discharge: HOME | End: 2023-02-07
Payer: MEDICAID

## 2023-02-07 ENCOUNTER — LAB (OUTPATIENT)
Dept: LAB | Facility: HOSPITAL | Age: 63
End: 2023-02-07
Payer: MEDICAID

## 2023-02-07 ENCOUNTER — HOSPITAL ENCOUNTER (OUTPATIENT)
Dept: GENERAL RADIOLOGY | Facility: HOSPITAL | Age: 63
Discharge: HOME | End: 2023-02-07
Payer: MEDICAID

## 2023-02-07 ENCOUNTER — PRE-ADMISSION TESTING (OUTPATIENT)
Dept: PREADMISSION TESTING | Facility: HOSPITAL | Age: 63
End: 2023-02-07
Payer: MEDICAID

## 2023-02-07 VITALS
SYSTOLIC BLOOD PRESSURE: 109 MMHG | WEIGHT: 190 LBS | TEMPERATURE: 98.5 F | DIASTOLIC BLOOD PRESSURE: 75 MMHG | HEIGHT: 65 IN | HEART RATE: 93 BPM | OXYGEN SATURATION: 98 % | RESPIRATION RATE: 20 BRPM | BODY MASS INDEX: 31.65 KG/M2

## 2023-02-07 DIAGNOSIS — M19.012 OSTEOARTHRITIS OF LEFT GLENOHUMERAL JOINT: ICD-10-CM

## 2023-02-07 LAB
ABO GROUP BLD: NORMAL
ANION GAP SERPL CALCULATED.3IONS-SCNC: 8.9 MMOL/L (ref 5–15)
APTT PPP: 29.9 SECONDS (ref 24–31)
BASOPHILS # BLD AUTO: 0.04 10*3/MM3 (ref 0–0.2)
BASOPHILS NFR BLD AUTO: 0.4 % (ref 0–1.5)
BLD GP AB SCN SERPL QL: NEGATIVE
BUN SERPL-MCNC: 19 MG/DL (ref 8–23)
BUN/CREAT SERPL: 21.8 (ref 7–25)
CALCIUM SPEC-SCNC: 9.5 MG/DL (ref 8.6–10.5)
CHLORIDE SERPL-SCNC: 103 MMOL/L (ref 98–107)
CO2 SERPL-SCNC: 26.1 MMOL/L (ref 22–29)
CREAT SERPL-MCNC: 0.87 MG/DL (ref 0.57–1)
DEPRECATED RDW RBC AUTO: 40 FL (ref 37–54)
EGFRCR SERPLBLD CKD-EPI 2021: 75.4 ML/MIN/1.73
EOSINOPHIL # BLD AUTO: 0.1 10*3/MM3 (ref 0–0.4)
EOSINOPHIL NFR BLD AUTO: 1.1 % (ref 0.3–6.2)
ERYTHROCYTE [DISTWIDTH] IN BLOOD BY AUTOMATED COUNT: 12.8 % (ref 12.3–15.4)
GLUCOSE SERPL-MCNC: 124 MG/DL (ref 65–99)
HBA1C MFR BLD: 6.3 % (ref 3.5–5.6)
HCT VFR BLD AUTO: 47.3 % (ref 34–46.6)
HGB BLD-MCNC: 15.7 G/DL (ref 12–15.9)
IMM GRANULOCYTES # BLD AUTO: 0.01 10*3/MM3 (ref 0–0.05)
IMM GRANULOCYTES NFR BLD AUTO: 0.1 % (ref 0–0.5)
INR PPP: 1 (ref 0.93–1.1)
LYMPHOCYTES # BLD AUTO: 3.47 10*3/MM3 (ref 0.7–3.1)
LYMPHOCYTES NFR BLD AUTO: 38.3 % (ref 19.6–45.3)
MCH RBC QN AUTO: 28 PG (ref 26.6–33)
MCHC RBC AUTO-ENTMCNC: 33.2 G/DL (ref 31.5–35.7)
MCV RBC AUTO: 84.3 FL (ref 79–97)
MONOCYTES # BLD AUTO: 0.42 10*3/MM3 (ref 0.1–0.9)
MONOCYTES NFR BLD AUTO: 4.6 % (ref 5–12)
MRSA DNA SPEC QL NAA+PROBE: NORMAL
NEUTROPHILS NFR BLD AUTO: 5.02 10*3/MM3 (ref 1.7–7)
NEUTROPHILS NFR BLD AUTO: 55.5 % (ref 42.7–76)
NRBC BLD AUTO-RTO: 0 /100 WBC (ref 0–0.2)
PLATELET # BLD AUTO: 322 10*3/MM3 (ref 140–450)
PMV BLD AUTO: 10 FL (ref 6–12)
POTASSIUM SERPL-SCNC: 4.6 MMOL/L (ref 3.5–5.2)
PROTHROMBIN TIME: 10.3 SECONDS (ref 9.6–11.7)
RBC # BLD AUTO: 5.61 10*6/MM3 (ref 3.77–5.28)
RH BLD: NEGATIVE
SODIUM SERPL-SCNC: 138 MMOL/L (ref 136–145)
T&S EXPIRATION DATE: NORMAL
WBC NRBC COR # BLD: 9.06 10*3/MM3 (ref 3.4–10.8)

## 2023-02-07 PROCEDURE — 86900 BLOOD TYPING SEROLOGIC ABO: CPT

## 2023-02-07 PROCEDURE — 86901 BLOOD TYPING SEROLOGIC RH(D): CPT

## 2023-02-07 PROCEDURE — 36415 COLL VENOUS BLD VENIPUNCTURE: CPT

## 2023-02-07 PROCEDURE — 85025 COMPLETE CBC W/AUTO DIFF WBC: CPT

## 2023-02-07 PROCEDURE — 85610 PROTHROMBIN TIME: CPT

## 2023-02-07 PROCEDURE — 93010 ELECTROCARDIOGRAM REPORT: CPT | Performed by: INTERNAL MEDICINE

## 2023-02-07 PROCEDURE — 85730 THROMBOPLASTIN TIME PARTIAL: CPT

## 2023-02-07 PROCEDURE — 93005 ELECTROCARDIOGRAM TRACING: CPT | Performed by: ORTHOPAEDIC SURGERY

## 2023-02-07 PROCEDURE — 80048 BASIC METABOLIC PNL TOTAL CA: CPT

## 2023-02-07 PROCEDURE — 83036 HEMOGLOBIN GLYCOSYLATED A1C: CPT

## 2023-02-07 PROCEDURE — 86850 RBC ANTIBODY SCREEN: CPT

## 2023-02-07 PROCEDURE — 87641 MR-STAPH DNA AMP PROBE: CPT

## 2023-02-07 PROCEDURE — 71046 X-RAY EXAM CHEST 2 VIEWS: CPT

## 2023-02-07 PROCEDURE — 97165 OT EVAL LOW COMPLEX 30 MIN: CPT

## 2023-02-07 RX ORDER — ACETAMINOPHEN 325 MG/1
650 TABLET ORAL EVERY 6 HOURS PRN
COMMUNITY

## 2023-02-07 RX ORDER — CYCLOBENZAPRINE HCL 10 MG
10 TABLET ORAL 3 TIMES DAILY PRN
COMMUNITY
End: 2023-03-24 | Stop reason: SDUPTHER

## 2023-02-07 RX ORDER — TRAMADOL HYDROCHLORIDE 50 MG/1
50 TABLET ORAL EVERY 6 HOURS PRN
Qty: 120 TABLET | Refills: 1 | Status: SHIPPED | OUTPATIENT
Start: 2023-02-07

## 2023-02-07 NOTE — THERAPY EVALUATION
Patient Name: Judy Koehler  : 1960    MRN: [id number]                              Today's Date: 2023       Admit Date: (Not on file)    Visit Dx: No diagnosis found.  Patient Active Problem List   Diagnosis   • Gastroesophageal reflux disease   • Hypertension   • Migraine   • Mixed hyperlipidemia   • Plantar fasciitis of left foot   • Rupture of hamstring tendon   • Type 2 diabetes mellitus without complication, without long-term current use of insulin (HCC)   • Traumatic complete tear of left rotator cuff   • Labral tear of long head of left biceps tendon   • Tobacco abuse   • Other insomnia   • Irritable bowel syndrome with both constipation and diarrhea   • DJD of left shoulder     Past Medical History:   Diagnosis Date   • Arthritis    • Diabetes mellitus (HCC)    • GERD (gastroesophageal reflux disease)    • Headache    • Histoplasmosis     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   • Hyperlipidemia    • Hypertension    • Murmur    • Seasonal allergies      Past Surgical History:   Procedure Laterality Date   • LUNG BIOPSY     • SUBTOTAL HYSTERECTOMY     • TONSILLECTOMY        General Information     Row Name 23 1343          OT Time and Intention    Document Type evaluation  -SR     Row Name 23 1343          Occupational Profile    Reason for Services/Referral (Occupational Profile) Pt is being evaluated prepop for L TSA on 2/15/23.  She is typically independent and lives at home with her spouse and is typically independent with ADLs.  She has difficulty walking long distances, though does not use AD.   will be able to be home and assist with ADLs as needed.  -SR     Row Name 23 1343          Living Environment    People in Home spouse  -SR           User Key  (r) = Recorded By, (t) = Taken By, (c) = Cosigned By    Initials Name Provider Type    SR Jenifer Hughes OT Occupational Therapist                 Mobility/ADL's     Row Name 23 1400          Transfers    Transfers sit-stand  transfer  -SR     Row Name 02/07/23 1409          Sit-Stand Transfer    Sit-Stand Blanch (Transfers) independent  -SR     Row Name 02/07/23 1409          Functional Mobility    Functional Mobility- Ind. Level independent  -SR     Row Name 02/07/23 1409          Activities of Daily Living    BADL Assessment/Intervention lower body dressing;upper body dressing  -SR     Row Name 02/07/23 1409          Lower Body Dressing Assessment/Training    Comment, (Lower Body Dressing) Pt states she always wears slip on shoes.  She was educated on modificatin for edgardo dressing technique for post surgery.  -SR     Row Name 02/07/23 1409          Upper Body Dressing Assessment/Training    Comment, (Upper Body Dressing) Pt required max assist to don sling.  She was provided with handout with pictures with instructions for sling and ice pack.  -SR           User Key  (r) = Recorded By, (t) = Taken By, (c) = Cosigned By    Initials Name Provider Type    Jenifer Dasilva OT Occupational Therapist               Obj/Interventions     Row Name 02/07/23 1421          Range of Motion Comprehensive    Comment, General Range of Motion RUE WFL, L shoulder flexion 90 degrees.  -SR     Row Name 02/07/23 1421          Strength Comprehensive (MMT)    Comment, General Manual Muscle Testing (MMT) Assessment RUE WFL, L shoulder 2+/5, L elbow 3+/5, L  4-/5  -SR     Row Name 02/07/23 1421          Balance    Balance Interventions sitting;standing;sit to stand;supported;static;dynamic;minimal challenge  -SR           User Key  (r) = Recorded By, (t) = Taken By, (c) = Cosigned By    Initials Name Provider Type    Jenifer Dasilva OT Occupational Therapist               Goals/Plan    No documentation.                Clinical Impression     Row Name 02/07/23 1424          Pain Assessment    Pretreatment Pain Rating 0/10 - no pain  -SR     Posttreatment Pain Rating 0/10 - no pain  -SR     Row Name 02/07/23 1427          Plan of  Care Review    Outcome Evaluation Pt is being evaluated prepop for L TSA on 2/15/23. She is typically independent and lives at home with her spouse and is typically independent with ADLs. She has difficulty walking long distances, though does not use AD.  will be able to be home and assist with ADLs as needed.  Pt demos limited ROM and strength in L shoulder.  She was educated on use of sling and ice pack and modifications for dressing and toileting.  She is receptive to all education.  Anticipate she will progress well after surgery.  -SR     Row Name 02/07/23 1427          Therapy Assessment/Plan (OT)    Therapy Frequency (OT) evaluation only  -SR     Row Name 02/07/23 1427          Therapy Plan Review/Discharge Plan (OT)    Anticipated Discharge Disposition (OT) home with assist  -           User Key  (r) = Recorded By, (t) = Taken By, (c) = Cosigned By    Initials Name Provider Type    SR Jenifer Hughes OT Occupational Therapist               Outcome Measures    No documentation.                 Occupational Therapy Education     Title: PT OT SLP Therapies (Done)     Topic: Occupational Therapy (Done)     Point: ADL training (Done)     Description:   Instruct learner(s) on proper safety adaptation and remediation techniques during self care or transfers.   Instruct in proper use of assistive devices.              Learning Progress Summary           Patient Acceptance, E,TB, VU by  at 2/7/2023 1437                               User Key     Initials Effective Dates Name Provider Type Discipline     06/16/21 -  Jenifer Hughes OT Occupational Therapist OT              OT Recommendation and Plan  Therapy Frequency (OT): evaluation only  Plan of Care Review  Outcome Evaluation: Pt is being evaluated prepop for L TSA on 2/15/23. She is typically independent and lives at home with her spouse and is typically independent with ADLs. She has difficulty walking long distances, though does not  use AD.  will be able to be home and assist with ADLs as needed.  Pt demos limited ROM and strength in L shoulder.  She was educated on use of sling and ice pack and modifications for dressing and toileting.  She is receptive to all education.  Anticipate she will progress well after surgery.     Time Calculation:    Time Calculation- OT     Row Name 02/07/23 1437             Time Calculation- OT    OT Start Time 1305  -SR      OT Stop Time 1325  -SR      OT Time Calculation (min) 20 min  -SR      Total Timed Code Minutes- OT 0 minute(s)  -SR      OT Received On 02/07/23  -SR            User Key  (r) = Recorded By, (t) = Taken By, (c) = Cosigned By    Initials Name Provider Type    SR Jenifer Hughes OT Occupational Therapist              Therapy Charges for Today     Code Description Service Date Service Provider Modifiers Qty    [id number]  OT EVAL LOW COMPLEXITY 4 2/7/2023 Jenifer Hughes OT GO 1               Jenifer Hughes OT  2/7/2023

## 2023-02-07 NOTE — PLAN OF CARE
Goal Outcome Evaluation:              Outcome Evaluation: Pt is being evaluated prepop for L TSA on 2/15/23. She is typically independent and lives at home with her spouse and is typically independent with ADLs. She has difficulty walking long distances, though does not use AD.  will be able to be home and assist with ADLs as needed.  Pt demos limited ROM and strength in L shoulder.  She was educated on use of sling and ice pack and modifications for dressing and toileting.  She is receptive to all education.  Anticipate she will progress well after surgery.

## 2023-02-10 LAB — QT INTERVAL: 356 MS

## 2023-02-10 NOTE — DISCHARGE PLACEMENT REQUEST
"Judy Laguna (62 y.o. Female)     Date of Birth   1960    Social Security Number       Address   [address] [place] [address]    Home Phone   [phone]    MRN   [id number]       Buddhist   Oriental orthodox    Marital Status                               Admission Date       Admission Type   Elective    Admitting Provider   Willian Gomez MD    Attending Provider   Willian Gomez MD    Department, Room/Bed   [facility] MAIN OR, --/--       Discharge Date       Discharge Disposition       Discharge Destination                               Attending Provider: Willian Gomez MD    Allergies: No Known Allergies    Isolation: None   Infection: None   Code Status: Not on file    Ht: 165.1 cm (65\")   Wt: 86.2 kg (190 lb)    Admission Cmt: None   Principal Problem: DJD of left shoulder [M19.012]                 Active Insurance as of 2/15/2023     Primary Coverage     Payor Plan Insurance Group Employer/Plan Group    [id number]     Payor Plan Address Payor Plan Phone Number Payor Plan Fax Number Effective Dates    MAIL STOP:   9/1/2022 - None Entered    [address] [address]       [facility] [address]       Subscriber Name Subscriber Birth Date Member ID       JUDY LAGUNA 1960 [id number]                 Emergency Contacts      (Rel.) Home Phone Work Phone Mobile Phone    TARYN LAGUNA (Spouse) [phone] -- --              "

## 2023-02-13 NOTE — H&P
[facility]   HISTORY AND PHYSICAL    Patient Name: Judy Koehler  : 1960  MRN: [id number]  Primary Care Physician:  Debbie Phillips APRN  Date of admission: (Not on file)    Subjective   Subjective     Chief Complaint: Left shoulder pain    This is a 62-year-old female with longstanding left shoulder pain presenting for shoulder arthroplasty        Review of Systems   Cardiovascular: Negative for chest pain.   Musculoskeletal: Positive for arthralgias.        Personal History     Past Medical History:   Diagnosis Date   • Arthritis    • Diabetes mellitus (HCC)    • GERD (gastroesophageal reflux disease)    • Headache    • Histoplasmosis     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   • Hyperlipidemia    • Hypertension    • Murmur    • Seasonal allergies        Past Surgical History:   Procedure Laterality Date   • LUNG BIOPSY     • SUBTOTAL HYSTERECTOMY     • TONSILLECTOMY         Family History: family history includes Diabetes in her father; Heart disease in her mother; Hypertension in her mother; No Known Problems in her brother, brother, brother, brother, sister, sister, sister, and son. Otherwise pertinent FHx was reviewed and not pertinent to current issue.    Social History:  reports that she has been smoking cigarettes. She has never used smokeless tobacco. She reports that she does not currently use alcohol. Drug use questions deferred to the physician.    Home Medications:  B-12, Blood Glucose Monitor System, Insulin Pen Needle, Semaglutide (1 MG/DOSE), Triamcinolone Acetonide, acetaminophen, atorvastatin, azithromycin, cetirizine, cyclobenzaprine, diphenhydrAMINE HCl, glimepiride, glucose blood, hydrOXYzine, lisinopril, pantoprazole, promethazine, pseudoephedrine, traMADol, and vitamin D    Allergies:  No Known Allergies    Objective    Objective     Vitals:      Left shoulder intact skin mild pain over the bicep groove and impingement area passive elevation 160 abduction 140 external patient 50 internal rotation L5.   She has pain and weakness on Speed, [place], supraspinatus testing.  Belly press and lift off are 4/5 and 3/5.  Active elevation is 120 degrees  Sensory and motor exam are intact all distributions. Radial pulse is palpable and capillary refill is less than two seconds to all digits.     Imaging:  Previous x-ray and MRI reviewed demonstrate mild to moderate glenohumeral arthritis with massive irreparable supraspinatus and infraspinatus tears with atrophy s     Assessment:  left shoulder degenerative disease with cuff tear     Plan:  Further conservative or surgical options were discussed she would like to proceed with left reverse total shoulder arthroplasty  Discussed the risks including bleeding, scar, infection, stiffness, nerve, artery, vein and tendon damage, instability, fracture, DVT, loss of life or limb. Issues of scapular notching and component revision were discussed. Questions were answered and addressed.  Postop sling dispensed  Greater than 15 minutes was spent demonstrating proper fit and use of the device and signs to monitor for complications    Willian Gomez MD

## 2023-02-15 ENCOUNTER — HOSPITAL ENCOUNTER (OUTPATIENT)
Facility: HOSPITAL | Age: 63
Discharge: HOME | End: 2023-02-16
Attending: ORTHOPAEDIC SURGERY | Admitting: ORTHOPAEDIC SURGERY
Payer: MEDICAID

## 2023-02-15 ENCOUNTER — ANESTHESIA EVENT (OUTPATIENT)
Dept: PERIOP | Facility: HOSPITAL | Age: 63
End: 2023-02-15
Payer: MEDICAID

## 2023-02-15 ENCOUNTER — APPOINTMENT (OUTPATIENT)
Dept: GENERAL RADIOLOGY | Facility: HOSPITAL | Age: 63
End: 2023-02-15
Payer: MEDICAID

## 2023-02-15 ENCOUNTER — ANESTHESIA (OUTPATIENT)
Dept: PERIOP | Facility: HOSPITAL | Age: 63
End: 2023-02-15
Payer: MEDICAID

## 2023-02-15 DIAGNOSIS — M19.012 OSTEOARTHRITIS OF LEFT GLENOHUMERAL JOINT: ICD-10-CM

## 2023-02-15 DIAGNOSIS — M19.012 PRIMARY OSTEOARTHRITIS OF LEFT SHOULDER: Primary | ICD-10-CM

## 2023-02-15 LAB
GLUCOSE BLDC GLUCOMTR-MCNC: 113 MG/DL (ref 70–105)
GLUCOSE BLDC GLUCOMTR-MCNC: 152 MG/DL (ref 70–105)
GLUCOSE BLDC GLUCOMTR-MCNC: 370 MG/DL (ref 70–105)
GLUCOSE BLDC GLUCOMTR-MCNC: 380 MG/DL (ref 70–105)
HCT VFR BLD AUTO: 40.7 % (ref 34–46.6)
HGB BLD-MCNC: 13.6 G/DL (ref 12–15.9)

## 2023-02-15 PROCEDURE — 97168 OT RE-EVAL EST PLAN CARE: CPT

## 2023-02-15 PROCEDURE — 85018 HEMOGLOBIN: CPT | Performed by: ORTHOPAEDIC SURGERY

## 2023-02-15 PROCEDURE — C9290 INJ, BUPIVACAINE LIPOSOME: HCPCS | Performed by: ANESTHESIOLOGY

## 2023-02-15 PROCEDURE — 25010000002 FENTANYL CITRATE (PF) 50 MCG/ML SOLUTION: Performed by: ANESTHESIOLOGY

## 2023-02-15 PROCEDURE — 0 BUPIVACAINE LIPOSOME 1.3 % SUSPENSION: Performed by: ANESTHESIOLOGY

## 2023-02-15 PROCEDURE — 97535 SELF CARE MNGMENT TRAINING: CPT

## 2023-02-15 PROCEDURE — 25010000002 PROPOFOL 1000 MG/100ML EMULSION: Performed by: NURSE ANESTHETIST, CERTIFIED REGISTERED

## 2023-02-15 PROCEDURE — G0378 HOSPITAL OBSERVATION PER HR: HCPCS

## 2023-02-15 PROCEDURE — 25010000002 VANCOMYCIN 1 G RECONSTITUTED SOLUTION 1 EACH VIAL: Performed by: ORTHOPAEDIC SURGERY

## 2023-02-15 PROCEDURE — 25010000002 KETOROLAC TROMETHAMINE PER 15 MG: Performed by: NURSE ANESTHETIST, CERTIFIED REGISTERED

## 2023-02-15 PROCEDURE — 25010000002 PHENYLEPHRINE 10 MG/ML SOLUTION 5 ML VIAL: Performed by: NURSE ANESTHETIST, CERTIFIED REGISTERED

## 2023-02-15 PROCEDURE — 80048 BASIC METABOLIC PNL TOTAL CA: CPT | Performed by: ORTHOPAEDIC SURGERY

## 2023-02-15 PROCEDURE — 25010000002 DEXAMETHASONE PER 1 MG: Performed by: ANESTHESIOLOGY

## 2023-02-15 PROCEDURE — 82962 GLUCOSE BLOOD TEST: CPT

## 2023-02-15 PROCEDURE — 25010000002 MIDAZOLAM PER 1 MG: Performed by: ANESTHESIOLOGY

## 2023-02-15 PROCEDURE — 25010000002 CEFAZOLIN PER 500 MG: Performed by: ORTHOPAEDIC SURGERY

## 2023-02-15 PROCEDURE — 85014 HEMATOCRIT: CPT | Performed by: ORTHOPAEDIC SURGERY

## 2023-02-15 PROCEDURE — C1776 JOINT DEVICE (IMPLANTABLE): HCPCS | Performed by: ORTHOPAEDIC SURGERY

## 2023-02-15 PROCEDURE — 25010000002 PROPOFOL 10 MG/ML EMULSION: Performed by: NURSE ANESTHETIST, CERTIFIED REGISTERED

## 2023-02-15 PROCEDURE — 23472 RECONSTRUCT SHOULDER JOINT: CPT | Performed by: PHYSICIAN ASSISTANT

## 2023-02-15 PROCEDURE — 23472 RECONSTRUCT SHOULDER JOINT: CPT | Performed by: ORTHOPAEDIC SURGERY

## 2023-02-15 PROCEDURE — 63710000001 INSULIN LISPRO (HUMAN) PER 5 UNITS: Performed by: HOSPITALIST

## 2023-02-15 PROCEDURE — 73030 X-RAY EXAM OF SHOULDER: CPT

## 2023-02-15 PROCEDURE — C1713 ANCHOR/SCREW BN/BN,TIS/BN: HCPCS | Performed by: ORTHOPAEDIC SURGERY

## 2023-02-15 PROCEDURE — 25010000002 SUCCINYLCHOLINE PER 20 MG: Performed by: NURSE ANESTHETIST, CERTIFIED REGISTERED

## 2023-02-15 DEVICE — INSRT SOCKT HUM/SHLDR ALTIVATE/REVERSE HXE/PE SZ32 SM: Type: IMPLANTABLE DEVICE | Site: SHOULDER | Status: FUNCTIONAL

## 2023-02-15 DEVICE — BASEPLT GLEN REV RSP TI 26X30MM: Type: IMPLANTABLE DEVICE | Site: SHOULDER | Status: FUNCTIONAL

## 2023-02-15 DEVICE — SCREW, LOCKING BONE, RSP, 5X14
Type: IMPLANTABLE DEVICE | Site: SHOULDER | Status: FUNCTIONAL
Brand: DJO SURGICAL

## 2023-02-15 DEVICE — IMPLANTABLE DEVICE: Type: IMPLANTABLE DEVICE | Site: SHOULDER | Status: FUNCTIONAL

## 2023-02-15 DEVICE — SPACR ALTIVATE REVERSE SM 8MM: Type: IMPLANTABLE DEVICE | Site: SHOULDER | Status: FUNCTIONAL

## 2023-02-15 DEVICE — SCREW, LOCKING BONE, RSP, 5X22
Type: IMPLANTABLE DEVICE | Site: SHOULDER | Status: FUNCTIONAL
Brand: DJO SURGICAL

## 2023-02-15 DEVICE — SUT NONABS MAXBRAID/PE NMBR2 HC5 38IN BLU 900334: Type: IMPLANTABLE DEVICE | Site: SHOULDER | Status: FUNCTIONAL

## 2023-02-15 DEVICE — STEM HUM SHLDR ALTIVATE/REVERSE SZ6 48MM SM SHT: Type: IMPLANTABLE DEVICE | Site: SHOULDER | Status: FUNCTIONAL

## 2023-02-15 DEVICE — TOTL REV SHLDR DJO: Type: IMPLANTABLE DEVICE | Status: FUNCTIONAL

## 2023-02-15 DEVICE — SCREW, LOCKING BONE, RSP, 5X30
Type: IMPLANTABLE DEVICE | Site: SHOULDER | Status: FUNCTIONAL
Brand: DJO SURGICAL

## 2023-02-15 RX ORDER — MORPHINE SULFATE 2 MG/ML
2 INJECTION, SOLUTION INTRAMUSCULAR; INTRAVENOUS EVERY 4 HOURS PRN
Status: DISCONTINUED | OUTPATIENT
Start: 2023-02-15 | End: 2023-02-16 | Stop reason: HOSPADM

## 2023-02-15 RX ORDER — FENTANYL CITRATE 50 UG/ML
INJECTION, SOLUTION INTRAMUSCULAR; INTRAVENOUS
Status: COMPLETED | OUTPATIENT
Start: 2023-02-15 | End: 2023-02-15

## 2023-02-15 RX ORDER — HYDRALAZINE HYDROCHLORIDE 20 MG/ML
5 INJECTION INTRAMUSCULAR; INTRAVENOUS
Status: DISCONTINUED | OUTPATIENT
Start: 2023-02-15 | End: 2023-02-15 | Stop reason: HOSPADM

## 2023-02-15 RX ORDER — NICOTINE POLACRILEX 4 MG
15 LOZENGE BUCCAL
Status: DISCONTINUED | OUTPATIENT
Start: 2023-02-15 | End: 2023-02-16 | Stop reason: HOSPADM

## 2023-02-15 RX ORDER — OXYCODONE HCL 10 MG/1
10 TABLET, FILM COATED, EXTENDED RELEASE ORAL ONCE
Status: COMPLETED | OUTPATIENT
Start: 2023-02-15 | End: 2023-02-15

## 2023-02-15 RX ORDER — TRAMADOL HYDROCHLORIDE 50 MG/1
50 TABLET ORAL EVERY 6 HOURS PRN
Status: DISCONTINUED | OUTPATIENT
Start: 2023-02-15 | End: 2023-02-16 | Stop reason: HOSPADM

## 2023-02-15 RX ORDER — OLANZAPINE 10 MG/2ML
1 INJECTION, POWDER, LYOPHILIZED, FOR SOLUTION INTRAMUSCULAR
Status: DISCONTINUED | OUTPATIENT
Start: 2023-02-15 | End: 2023-02-16 | Stop reason: HOSPADM

## 2023-02-15 RX ORDER — HYDROCODONE BITARTRATE AND ACETAMINOPHEN 7.5; 325 MG/1; MG/1
2 TABLET ORAL EVERY 4 HOURS PRN
Status: DISCONTINUED | OUTPATIENT
Start: 2023-02-15 | End: 2023-02-15 | Stop reason: HOSPADM

## 2023-02-15 RX ORDER — DIPHENHYDRAMINE HYDROCHLORIDE 50 MG/ML
12.5 INJECTION INTRAMUSCULAR; INTRAVENOUS
Status: DISCONTINUED | OUTPATIENT
Start: 2023-02-15 | End: 2023-02-15 | Stop reason: HOSPADM

## 2023-02-15 RX ORDER — OXYCODONE HYDROCHLORIDE 5 MG/1
10 TABLET ORAL EVERY 4 HOURS PRN
Status: DISCONTINUED | OUTPATIENT
Start: 2023-02-15 | End: 2023-02-16 | Stop reason: HOSPADM

## 2023-02-15 RX ORDER — NALOXONE HCL 0.4 MG/ML
0.4 VIAL (ML) INJECTION
Status: DISCONTINUED | OUTPATIENT
Start: 2023-02-15 | End: 2023-02-16 | Stop reason: HOSPADM

## 2023-02-15 RX ORDER — DEXAMETHASONE SODIUM PHOSPHATE 4 MG/ML
INJECTION, SOLUTION INTRA-ARTICULAR; INTRALESIONAL; INTRAMUSCULAR; INTRAVENOUS; SOFT TISSUE
Status: COMPLETED | OUTPATIENT
Start: 2023-02-15 | End: 2023-02-15

## 2023-02-15 RX ORDER — BISACODYL 10 MG
10 SUPPOSITORY, RECTAL RECTAL DAILY PRN
Status: DISCONTINUED | OUTPATIENT
Start: 2023-02-15 | End: 2023-02-16 | Stop reason: HOSPADM

## 2023-02-15 RX ORDER — MIDAZOLAM HYDROCHLORIDE 1 MG/ML
INJECTION INTRAMUSCULAR; INTRAVENOUS
Status: COMPLETED | OUTPATIENT
Start: 2023-02-15 | End: 2023-02-15

## 2023-02-15 RX ORDER — FLUMAZENIL 0.1 MG/ML
0.1 INJECTION INTRAVENOUS AS NEEDED
Status: DISCONTINUED | OUTPATIENT
Start: 2023-02-15 | End: 2023-02-15 | Stop reason: HOSPADM

## 2023-02-15 RX ORDER — INSULIN LISPRO 100 [IU]/ML
2-7 INJECTION, SOLUTION INTRAVENOUS; SUBCUTANEOUS
Status: DISCONTINUED | OUTPATIENT
Start: 2023-02-15 | End: 2023-02-16 | Stop reason: HOSPADM

## 2023-02-15 RX ORDER — FENTANYL CITRATE 50 UG/ML
50 INJECTION, SOLUTION INTRAMUSCULAR; INTRAVENOUS
Status: DISCONTINUED | OUTPATIENT
Start: 2023-02-15 | End: 2023-02-15 | Stop reason: HOSPADM

## 2023-02-15 RX ORDER — ONDANSETRON 2 MG/ML
4 INJECTION INTRAMUSCULAR; INTRAVENOUS ONCE AS NEEDED
Status: DISCONTINUED | OUTPATIENT
Start: 2023-02-15 | End: 2023-02-15 | Stop reason: HOSPADM

## 2023-02-15 RX ORDER — PANTOPRAZOLE SODIUM 40 MG/1
40 TABLET, DELAYED RELEASE ORAL DAILY
Status: DISCONTINUED | OUTPATIENT
Start: 2023-02-16 | End: 2023-02-16 | Stop reason: HOSPADM

## 2023-02-15 RX ORDER — ROCURONIUM BROMIDE 10 MG/ML
INJECTION, SOLUTION INTRAVENOUS AS NEEDED
Status: DISCONTINUED | OUTPATIENT
Start: 2023-02-15 | End: 2023-02-15 | Stop reason: SURG

## 2023-02-15 RX ORDER — MELOXICAM 15 MG/1
15 TABLET ORAL DAILY
Status: DISCONTINUED | OUTPATIENT
Start: 2023-02-16 | End: 2023-02-16 | Stop reason: HOSPADM

## 2023-02-15 RX ORDER — OXYCODONE HCL 10 MG/1
20 TABLET, FILM COATED, EXTENDED RELEASE ORAL EVERY 12 HOURS SCHEDULED
Status: DISCONTINUED | OUTPATIENT
Start: 2023-02-15 | End: 2023-02-15 | Stop reason: HOSPADM

## 2023-02-15 RX ORDER — ATORVASTATIN CALCIUM 20 MG/1
20 TABLET, FILM COATED ORAL DAILY
Status: DISCONTINUED | OUTPATIENT
Start: 2023-02-16 | End: 2023-02-16 | Stop reason: HOSPADM

## 2023-02-15 RX ORDER — DIPHENHYDRAMINE HCL 25 MG
25 CAPSULE ORAL NIGHTLY PRN
Status: DISCONTINUED | OUTPATIENT
Start: 2023-02-15 | End: 2023-02-16 | Stop reason: HOSPADM

## 2023-02-15 RX ORDER — SODIUM CHLORIDE 9 MG/ML
75 INJECTION, SOLUTION INTRAVENOUS CONTINUOUS
Status: DISCONTINUED | OUTPATIENT
Start: 2023-02-15 | End: 2023-02-16 | Stop reason: HOSPADM

## 2023-02-15 RX ORDER — ACETAMINOPHEN 650 MG/1
650 SUPPOSITORY RECTAL EVERY 4 HOURS PRN
Status: DISCONTINUED | OUTPATIENT
Start: 2023-02-15 | End: 2023-02-16 | Stop reason: HOSPADM

## 2023-02-15 RX ORDER — DIPHENHYDRAMINE HCL 25 MG
25 CAPSULE ORAL EVERY 6 HOURS PRN
Status: DISCONTINUED | OUTPATIENT
Start: 2023-02-15 | End: 2023-02-16 | Stop reason: HOSPADM

## 2023-02-15 RX ORDER — ASPIRIN 325 MG
325 TABLET, DELAYED RELEASE (ENTERIC COATED) ORAL EVERY 12 HOURS SCHEDULED
Status: DISCONTINUED | OUTPATIENT
Start: 2023-02-15 | End: 2023-02-16 | Stop reason: HOSPADM

## 2023-02-15 RX ORDER — LABETALOL HYDROCHLORIDE 5 MG/ML
5 INJECTION, SOLUTION INTRAVENOUS
Status: DISCONTINUED | OUTPATIENT
Start: 2023-02-15 | End: 2023-02-15 | Stop reason: HOSPADM

## 2023-02-15 RX ORDER — KETAMINE HCL IN NACL, ISO-OSM 100MG/10ML
SYRINGE (ML) INJECTION AS NEEDED
Status: DISCONTINUED | OUTPATIENT
Start: 2023-02-15 | End: 2023-02-15 | Stop reason: SURG

## 2023-02-15 RX ORDER — SODIUM CHLORIDE, SODIUM LACTATE, POTASSIUM CHLORIDE, CALCIUM CHLORIDE 600; 310; 30; 20 MG/100ML; MG/100ML; MG/100ML; MG/100ML
1000 INJECTION, SOLUTION INTRAVENOUS CONTINUOUS
Status: DISCONTINUED | OUTPATIENT
Start: 2023-02-15 | End: 2023-02-16 | Stop reason: HOSPADM

## 2023-02-15 RX ORDER — ACETAMINOPHEN 325 MG/1
650 TABLET ORAL EVERY 4 HOURS PRN
Status: DISCONTINUED | OUTPATIENT
Start: 2023-02-15 | End: 2023-02-16 | Stop reason: HOSPADM

## 2023-02-15 RX ORDER — NALOXONE HCL 0.4 MG/ML
0.4 VIAL (ML) INJECTION AS NEEDED
Status: DISCONTINUED | OUTPATIENT
Start: 2023-02-15 | End: 2023-02-15 | Stop reason: HOSPADM

## 2023-02-15 RX ORDER — MELOXICAM 15 MG/1
15 TABLET ORAL ONCE
Status: COMPLETED | OUTPATIENT
Start: 2023-02-15 | End: 2023-02-15

## 2023-02-15 RX ORDER — ONDANSETRON 4 MG/1
4 TABLET, FILM COATED ORAL EVERY 6 HOURS PRN
Status: DISCONTINUED | OUTPATIENT
Start: 2023-02-15 | End: 2023-02-16 | Stop reason: HOSPADM

## 2023-02-15 RX ORDER — PROPOFOL 10 MG/ML
INJECTION, EMULSION INTRAVENOUS AS NEEDED
Status: DISCONTINUED | OUTPATIENT
Start: 2023-02-15 | End: 2023-02-15 | Stop reason: SURG

## 2023-02-15 RX ORDER — ACETAMINOPHEN 325 MG/1
650 TABLET ORAL ONCE AS NEEDED
Status: DISCONTINUED | OUTPATIENT
Start: 2023-02-15 | End: 2023-02-15 | Stop reason: HOSPADM

## 2023-02-15 RX ORDER — ZOLPIDEM TARTRATE 5 MG/1
5 TABLET ORAL NIGHTLY PRN
Status: DISCONTINUED | OUTPATIENT
Start: 2023-02-15 | End: 2023-02-16 | Stop reason: HOSPADM

## 2023-02-15 RX ORDER — DEXTROSE MONOHYDRATE 25 G/50ML
25 INJECTION, SOLUTION INTRAVENOUS
Status: DISCONTINUED | OUTPATIENT
Start: 2023-02-15 | End: 2023-02-16 | Stop reason: HOSPADM

## 2023-02-15 RX ORDER — DIPHENHYDRAMINE HYDROCHLORIDE 50 MG/ML
25 INJECTION INTRAMUSCULAR; INTRAVENOUS NIGHTLY PRN
Status: DISCONTINUED | OUTPATIENT
Start: 2023-02-15 | End: 2023-02-16 | Stop reason: HOSPADM

## 2023-02-15 RX ORDER — SODIUM CHLORIDE 0.9 % (FLUSH) 0.9 %
10 SYRINGE (ML) INJECTION AS NEEDED
Status: DISCONTINUED | OUTPATIENT
Start: 2023-02-15 | End: 2023-02-15 | Stop reason: HOSPADM

## 2023-02-15 RX ORDER — PREGABALIN 75 MG/1
150 CAPSULE ORAL ONCE
Status: COMPLETED | OUTPATIENT
Start: 2023-02-15 | End: 2023-02-15

## 2023-02-15 RX ORDER — TRANEXAMIC ACID 10 MG/ML
1000 INJECTION, SOLUTION INTRAVENOUS ONCE
Status: COMPLETED | OUTPATIENT
Start: 2023-02-15 | End: 2023-02-15

## 2023-02-15 RX ORDER — ACETAMINOPHEN 650 MG/1
650 SUPPOSITORY RECTAL ONCE AS NEEDED
Status: DISCONTINUED | OUTPATIENT
Start: 2023-02-15 | End: 2023-02-15 | Stop reason: HOSPADM

## 2023-02-15 RX ORDER — SUCCINYLCHOLINE CHLORIDE 20 MG/ML
INJECTION INTRAMUSCULAR; INTRAVENOUS AS NEEDED
Status: DISCONTINUED | OUTPATIENT
Start: 2023-02-15 | End: 2023-02-15 | Stop reason: SURG

## 2023-02-15 RX ORDER — ONDANSETRON 2 MG/ML
4 INJECTION INTRAMUSCULAR; INTRAVENOUS EVERY 6 HOURS PRN
Status: DISCONTINUED | OUTPATIENT
Start: 2023-02-15 | End: 2023-02-16 | Stop reason: HOSPADM

## 2023-02-15 RX ORDER — PROCHLORPERAZINE EDISYLATE 5 MG/ML
10 INJECTION INTRAMUSCULAR; INTRAVENOUS ONCE AS NEEDED
Status: DISCONTINUED | OUTPATIENT
Start: 2023-02-15 | End: 2023-02-15 | Stop reason: HOSPADM

## 2023-02-15 RX ORDER — FENTANYL CITRATE 50 UG/ML
25 INJECTION, SOLUTION INTRAMUSCULAR; INTRAVENOUS
Status: DISCONTINUED | OUTPATIENT
Start: 2023-02-15 | End: 2023-02-15 | Stop reason: HOSPADM

## 2023-02-15 RX ORDER — BUPIVACAINE HYDROCHLORIDE 5 MG/ML
INJECTION, SOLUTION EPIDURAL; INTRACAUDAL
Status: COMPLETED | OUTPATIENT
Start: 2023-02-15 | End: 2023-02-15

## 2023-02-15 RX ORDER — KETOROLAC TROMETHAMINE 30 MG/ML
INJECTION, SOLUTION INTRAMUSCULAR; INTRAVENOUS AS NEEDED
Status: DISCONTINUED | OUTPATIENT
Start: 2023-02-15 | End: 2023-02-15 | Stop reason: SURG

## 2023-02-15 RX ORDER — LIDOCAINE HYDROCHLORIDE 10 MG/ML
0.5 INJECTION, SOLUTION INFILTRATION; PERINEURAL ONCE AS NEEDED
Status: DISCONTINUED | OUTPATIENT
Start: 2023-02-15 | End: 2023-02-15 | Stop reason: HOSPADM

## 2023-02-15 RX ORDER — ALBUTEROL SULFATE 2.5 MG/3ML
2.5 SOLUTION RESPIRATORY (INHALATION) ONCE AS NEEDED
Status: DISCONTINUED | OUTPATIENT
Start: 2023-02-15 | End: 2023-02-15 | Stop reason: HOSPADM

## 2023-02-15 RX ORDER — LIDOCAINE HYDROCHLORIDE 20 MG/ML
INJECTION, SOLUTION EPIDURAL; INFILTRATION; INTRACAUDAL; PERINEURAL AS NEEDED
Status: DISCONTINUED | OUTPATIENT
Start: 2023-02-15 | End: 2023-02-15 | Stop reason: SURG

## 2023-02-15 RX ADMIN — INSULIN LISPRO 6 UNITS: 100 INJECTION, SOLUTION INTRAVENOUS; SUBCUTANEOUS at 17:42

## 2023-02-15 RX ADMIN — MIDAZOLAM 2 MG: 1 INJECTION INTRAMUSCULAR; INTRAVENOUS at 08:38

## 2023-02-15 RX ADMIN — KETOROLAC TROMETHAMINE 30 MG: 30 INJECTION, SOLUTION INTRAMUSCULAR; INTRAVENOUS at 10:57

## 2023-02-15 RX ADMIN — PROPOFOL INJECTABLE EMULSION 150 MG: 10 INJECTION, EMULSION INTRAVENOUS at 09:59

## 2023-02-15 RX ADMIN — INSULIN LISPRO 6 UNITS: 100 INJECTION, SOLUTION INTRAVENOUS; SUBCUTANEOUS at 22:12

## 2023-02-15 RX ADMIN — PROPOFOL 150 MCG/KG/MIN: 10 INJECTION, EMULSION INTRAVENOUS at 10:00

## 2023-02-15 RX ADMIN — OXYCODONE HYDROCHLORIDE 10 MG: 5 TABLET ORAL at 20:15

## 2023-02-15 RX ADMIN — SODIUM CHLORIDE 75 ML/HR: 0.9 INJECTION, SOLUTION INTRAVENOUS at 13:54

## 2023-02-15 RX ADMIN — LIDOCAINE HYDROCHLORIDE 40 MG: 20 INJECTION, SOLUTION EPIDURAL; INFILTRATION; INTRACAUDAL; PERINEURAL at 09:59

## 2023-02-15 RX ADMIN — DEXAMETHASONE SODIUM PHOSPHATE 2 MG: 4 INJECTION, SOLUTION INTRAMUSCULAR; INTRAVENOUS at 09:59

## 2023-02-15 RX ADMIN — ASPIRIN 325 MG: 325 TABLET, COATED ORAL at 20:14

## 2023-02-15 RX ADMIN — Medication 20 MG: at 10:09

## 2023-02-15 RX ADMIN — BUPIVACAINE 10 ML: 13.3 INJECTION, SUSPENSION, LIPOSOMAL INFILTRATION at 08:38

## 2023-02-15 RX ADMIN — DEXAMETHASONE SODIUM PHOSPHATE 4 MG: 4 INJECTION, SOLUTION INTRAMUSCULAR; INTRAVENOUS at 08:38

## 2023-02-15 RX ADMIN — SODIUM CHLORIDE, POTASSIUM CHLORIDE, SODIUM LACTATE AND CALCIUM CHLORIDE 1000 ML: 600; 310; 30; 20 INJECTION, SOLUTION INTRAVENOUS at 13:53

## 2023-02-15 RX ADMIN — BENZOYL PEROXIDE: 100 LIQUID TOPICAL at 08:30

## 2023-02-15 RX ADMIN — CEFAZOLIN 2 G: 2 INJECTION, POWDER, FOR SOLUTION INTRAMUSCULAR; INTRAVENOUS at 17:41

## 2023-02-15 RX ADMIN — OXYCODONE HYDROCHLORIDE 10 MG: 10 TABLET, FILM COATED, EXTENDED RELEASE ORAL at 08:20

## 2023-02-15 RX ADMIN — MELOXICAM 15 MG: 15 TABLET ORAL at 08:20

## 2023-02-15 RX ADMIN — BUPIVACAINE HYDROCHLORIDE 10 ML: 5 INJECTION, SOLUTION EPIDURAL; INTRACAUDAL; PERINEURAL at 08:38

## 2023-02-15 RX ADMIN — SUCCINYLCHOLINE CHLORIDE 100 MG: 20 INJECTION, SOLUTION INTRAMUSCULAR; INTRAVENOUS at 09:59

## 2023-02-15 RX ADMIN — FENTANYL CITRATE 100 MCG: 50 INJECTION, SOLUTION INTRAMUSCULAR; INTRAVENOUS at 08:38

## 2023-02-15 RX ADMIN — PREGABALIN 150 MG: 75 CAPSULE ORAL at 08:20

## 2023-02-15 RX ADMIN — SODIUM CHLORIDE, POTASSIUM CHLORIDE, SODIUM LACTATE AND CALCIUM CHLORIDE 1000 ML: 600; 310; 30; 20 INJECTION, SOLUTION INTRAVENOUS at 08:16

## 2023-02-15 RX ADMIN — TRANEXAMIC ACID 1000 MG: 10 INJECTION, SOLUTION INTRAVENOUS at 10:05

## 2023-02-15 RX ADMIN — ROCURONIUM BROMIDE 10 MG: 50 INJECTION, SOLUTION INTRAVENOUS at 09:59

## 2023-02-15 RX ADMIN — PHENYLEPHRINE HYDROCHLORIDE 0.8 MCG/KG/MIN: 10 INJECTION INTRAVENOUS at 10:05

## 2023-02-15 RX ADMIN — CEFAZOLIN 2 G: 2 INJECTION, POWDER, FOR SOLUTION INTRAMUSCULAR; INTRAVENOUS at 10:00

## 2023-02-15 RX ADMIN — ZOLPIDEM TARTRATE 5 MG: 5 TABLET ORAL at 23:01

## 2023-02-15 NOTE — DISCHARGE INSTR - APPOINTMENTS
Outpatient Physical Therapy  [place] [address] [place] [address]  [phone]    Monday 2/20 at 1:00, arrive at 12:45 for paperwork

## 2023-02-15 NOTE — PLAN OF CARE
Goal Outcome Evaluation:  Plan of Care Reviewed With: patient, spouse        Progress: improving      Patient admitted to floor, family at bedside, call light in hand, diet ordered

## 2023-02-15 NOTE — ANESTHESIA PROCEDURE NOTES
Airway  Urgency: elective    Date/Time: 2/15/2023 10:00 AM  Airway not difficult    General Information and Staff    Patient location during procedure: OR  CRNA/CAA: Raheem Taylor CRNA    Indications and Patient Condition  Indications for airway management: airway protection    Preoxygenated: yes  Mask difficulty assessment: 1 - vent by mask    Final Airway Details  Final airway type: endotracheal airway      Successful airway: ETT  Cuffed: yes (mop)   Successful intubation technique: direct laryngoscopy  Facilitating devices/methods: intubating stylet and cricoid pressure  Endotracheal tube insertion site: oral  Blade: Love  Blade size: 2  ETT size (mm): 7.0  Cormack-Lehane Classification: grade I - full view of glottis  Placement verified by: chest auscultation   Measured from: teeth  ETT/EBT  to teeth (cm): 21  Number of attempts at approach: 1  Assessment: lips, teeth, and gum same as pre-op and atraumatic intubation

## 2023-02-15 NOTE — ANESTHESIA PROCEDURE NOTES
Peripheral Block    Pre-sedation assessment completed: 2/15/2023 8:38 AM    Patient reassessed immediately prior to procedure    Patient location during procedure: pre-op  Start time: 2/15/2023 8:38 AM  Stop time: 2/15/2023 8:38 AM  Reason for block: at surgeon's request and post-op pain management  Performed by  Anesthesiologist: Jerome Silverman MD  Preanesthetic Checklist  Completed: patient identified, IV checked, site marked, risks and benefits discussed, surgical consent, monitors and equipment checked, pre-op evaluation and timeout performed  Prep:  Pt Position: sitting  Prep: ChloraPrep  Patient monitoring: blood pressure monitoring, continuous pulse oximetry and EKG  Procedure    Sedation: yes    Guidance:ultrasound guided  Images:still images obtained, printed/placed on chart    Laterality:left  Block Type:interscalene  Injection Technique:single-shot  Needle Type:echogenic  Needle Gauge:20 G  Resistance on Injection: none  Sedation medications used: midazolam (VERSED) injection - Intravenous   2 mg - 2/15/2023 8:38:00 AM  fentaNYL citrate (PF) (SUBLIMAZE) injection - Intravenous   100 mcg - 2/15/2023 8:38:00 AM  Medications Used: dexamethasone (DECADRON) injection - Injection   4 mg - 2/15/2023 8:38:00 AM  bupivacaine liposome (EXPAREL) injection 1.3% - Injection   10 mL - 2/15/2023 8:38:00 AM  bupivacaine PF (MARCAINE) injection 0.5% - Injection   10 mL - 2/15/2023 8:38:00 AM      Post Assessment  Injection Assessment: negative aspiration for heme, no paresthesia on injection and incremental injection  Patient Tolerance:comfortable throughout block  Complications:no

## 2023-02-15 NOTE — THERAPY EVALUATION
Patient Name: Judy Koehler  : 1960    MRN: [id number]                              Today's Date: 2/15/2023       Admit Date: 2/15/2023    Visit Dx:     ICD-10-CM ICD-9-CM   1. Osteoarthritis of left glenohumeral joint  M19.012 715.91     Patient Active Problem List   Diagnosis   • Gastroesophageal reflux disease   • Hypertension   • Migraine   • Mixed hyperlipidemia   • Plantar fasciitis of left foot   • Rupture of hamstring tendon   • Type 2 diabetes mellitus without complication, without long-term current use of insulin (HCC)   • Traumatic complete tear of left rotator cuff   • Labral tear of long head of left biceps tendon   • Tobacco abuse   • Other insomnia   • Irritable bowel syndrome with both constipation and diarrhea   • DJD of left shoulder     Past Medical History:   Diagnosis Date   • Arthritis    • Diabetes mellitus (HCC)    • GERD (gastroesophageal reflux disease)    • Headache    • Histoplasmosis     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   • Hyperlipidemia    • Hypertension    • Murmur    • Seasonal allergies      Past Surgical History:   Procedure Laterality Date   • LUNG BIOPSY     • SUBTOTAL HYSTERECTOMY     • TONSILLECTOMY        General Information     Row Name 02/15/23 1527          OT Time and Intention    Document Type re-evaluation  -LS     Mode of Treatment occupational therapy  -     Row Name 02/15/23 1527          General Information    Patient Profile Reviewed yes  -LS     Prior Level of Function independent:;ADL's;all household mobility;community mobility  -LS     Existing Precautions/Restrictions no known precautions/restrictions  -LS     Barriers to Rehab none identified  -     Row Name 02/15/23 1527          Living Environment    People in Home spouse  -     Row Name 02/15/23 1527          Cognition    Orientation Status (Cognition) oriented x 4  -LS     Row Name 02/15/23 1527          Safety Issues, Functional Mobility    Impairments Affecting Function (Mobility) range of motion  (ROM);strength  -           User Key  (r) = Recorded By, (t) = Taken By, (c) = Cosigned By    Initials Name Provider Type    Jordon Chaney OT Occupational Therapist                 Mobility/ADL's     Row Name 02/15/23 1528          Bed Mobility    Bed Mobility bed mobility (all) activities  -     All Activities, [place] (Bed Mobility) independent  -     Assistive Device (Bed Mobility) head of bed elevated;bed rails  -     Row Name 02/15/23 1528          Transfers    Transfers sit-stand transfer  -     Row Name 02/15/23 1528          Sit-Stand Transfer    Sit-Stand [place] (Transfers) independent  -     Row Name 02/15/23 1528          Functional Mobility    Functional Mobility- Ind. Level independent  -     Row Name 02/15/23 1528          Activities of Daily Living    BADL Assessment/Intervention upper body dressing;toileting  -     Row Name 02/15/23 1528          Mobility    Extremity Weight-bearing Status left upper extremity  -     Left Upper Extremity (Weight-bearing Status) non weight-bearing (NWB)  -     Row Name 02/15/23 1528          Upper Body Dressing Assessment/Training    [place] Level (Upper Body Dressing) moderate assist (50% patient effort);doff;don  -     Position (Upper Body Dressing) edge of bed sitting  -     Comment, (Upper Body Dressing) Donning/doffing cold therapy and shoulder sling  -     Row Name 02/15/23 1528          Toileting Assessment/Training    [place] Level (Toileting) supervision  -     Assistive Devices (Toileting) commode  -           User Key  (r) = Recorded By, (t) = Taken By, (c) = Cosigned By    Initials Name Provider Type    Jordon Chaney OT Occupational Therapist               Obj/Interventions     Row Name 02/15/23 1530          Sensory Assessment (Somatosensory)    Sensory Assessment N/t d/t nerve block  -     Row Name 02/15/23 1530          Range of Motion Comprehensive    Comment, General Range of Motion L  shoulder to 90 degrees flexion  -LS     Row Name 02/15/23 1530          Strength Comprehensive (MMT)    Comment, General Manual Muscle Testing (MMT) Assessment LUE NTn  -     Row Name 02/15/23 1530          Balance    Comment, Balance No balance deficits  -           User Key  (r) = Recorded By, (t) = Taken By, (c) = Cosigned By    Initials Name Provider Type    LS Jordon Alanis OT Occupational Therapist               Goals/Plan     Row Name 02/15/23 1536          Bathing Goal 1 (OT)    Activity/Device (Bathing Goal 1, OT) upper body bathing;lower body bathing  -LS     [place] Level/Cues Needed (Bathing Goal 1, OT) standby assist  -LS     Time Frame (Bathing Goal 1, OT) long term goal (LTG);2 weeks  -     Row Name 02/15/23 1536          Dressing Goal 1 (OT)    Activity/Device (Dressing Goal 1, OT) upper body dressing;lower body dressing  -LS     [place]/Cues Needed (Dressing Goal 1, OT) standby assist  -LS     Time Frame (Dressing Goal 1, OT) long term goal (LTG);2 weeks  -[facility] Name 02/15/23 1536          Strength Goal 1 (OT)    Strength Goal 1 (OT) Pt will complete post-op HEP with IND  -LS     Time Frame (Strength Goal 1, OT) long term goal (LTG);2 weeks  -[facility] Name 02/15/23 1536          Therapy Assessment/Plan (OT)    Planned Therapy Interventions (OT) BADL retraining;activity tolerance training;functional balance retraining;IADL retraining;occupation/activity based interventions;ROM/therapeutic exercise;strengthening exercise;transfer/mobility retraining  -           User Key  (r) = Recorded By, (t) = Taken By, (c) = Cosigned By    Initials Name Provider Type    LS Jordon Alanis OT Occupational Therapist               Clinical Impression     Row Name 02/15/23 1532          Pain Assessment    Pretreatment Pain Rating 0/10 - no pain  -LS     Posttreatment Pain Rating 0/10 - no pain  -LS     [facility] Name 02/15/23 1532          Plan of Care Review    Plan of Care Reviewed With  patient;spouse  -     Outcome Evaluation Pt. reevaluated this date POD # 0 LUE rTSA. Pt. completes ambulatory transfer to and from bathroom w/ supervision, and all toileting ADLs with SBA. Moderate A provided for donning/doffing ice/sling this date. PROM of surgical shoulder completed to approx 90* flexion, and pt completed all other post-op exercises verbalizing and demoing proper understanding. Pt. will benefit from continued skilled occupational therapy to maximize surgical benefits and improve ADL function, recommend HHOT at d/c.  -     Row Name 02/15/23 1532          Therapy Assessment/Plan (OT)    Rehab Potential (OT) good, to achieve stated therapy goals  -     Criteria for Skilled Therapeutic Interventions Met (OT) yes;skilled treatment is necessary  -     Therapy Frequency (OT) 5 times/wk  -     Predicted Duration of Therapy Intervention (OT) until dc  -     Row Name 02/15/23 1532          Therapy Plan Review/Discharge Plan (OT)    Anticipated Discharge Disposition (OT) home with assist;home with home health  -     Row Name 02/15/23 1532          Vital Signs    Pre SpO2 (%) 98  -LS     O2 Delivery Pre Treatment room air  -LS     Intra SpO2 (%) 97  -LS     O2 Delivery Intra Treatment room air  -LS     Post SpO2 (%) 98  -LS     O2 Delivery Post Treatment room air  -LS     Pre Patient Position Supine  -LS     Intra Patient Position Standing  -LS     Post Patient Position Sitting  -     Row Name 02/15/23 1532          Positioning and Restraints    Pre-Treatment Position in bed  -LS     Post Treatment Position chair  -LS     In Chair notified nsg;reclined;call light within reach;encouraged to call for assist;with family/caregiver  -           User Key  (r) = Recorded By, (t) = Taken By, (c) = Cosigned By    Initials Name Provider Type    Jordon Chaney OT Occupational Therapist               Outcome Measures     Row Name 02/15/23 1538          How much help from another is currently  needed...    Putting on and taking off regular lower body clothing? 3  -LS     Bathing (including washing, rinsing, and drying) 3  -LS     Toileting (which includes using toilet bed pan or urinal) 4  -LS     Putting on and taking off regular upper body clothing 3  -LS     Taking care of personal grooming (such as brushing teeth) 4  -LS     Eating meals 4  -LS     AM-PAC 6 Clicks Score (OT) 21  -LS     Row Name 02/15/23 1400          How much help from another person do you currently need...    Turning from your back to your side while in flat bed without using bedrails? 3  -SM     Moving from lying on back to sitting on the side of a flat bed without bedrails? 3  -SM     Moving to and from a bed to a chair (including a wheelchair)? 3  -SM     Standing up from a chair using your arms (e.g., wheelchair, bedside chair)? 3  -SM     Climbing 3-5 steps with a railing? 3  -SM     To walk in hospital room? 3  -SM     AM-PAC 6 Clicks Score (PT) 18  -SM     Highest level of mobility 6 --> Walked 10 steps or more  -     Row Name 02/15/23 1538          Functional Assessment    Outcome Measure Options AM-PAC 6 Clicks Daily Activity (OT)  -           User Key  (r) = Recorded By, (t) = Taken By, (c) = Cosigned By    Initials Name Provider Type    Delores Meyer, RN Registered Nurse    Jordon Chanye OT Occupational Therapist                Occupational Therapy Education     Title: PT OT SLP Therapies (Done)     Topic: Occupational Therapy (Done)     Point: ADL training (Done)     Description:   Instruct learner(s) on proper safety adaptation and remediation techniques during self care or transfers.   Instruct in proper use of assistive devices.              Learning Progress Summary           Patient Acceptance, E,TB, VU by LS at 2/15/2023 1539    Acceptance, E,TB, VU by SR at 2/7/2023 1437                   Point: Home exercise program (Done)     Description:   Instruct learner(s) on appropriate technique for monitoring,  assisting and/or progressing therapeutic exercises/activities.              Learning Progress Summary           Patient Acceptance, E,TB, VU by  at 2/15/2023 1539                   Point: Precautions (Done)     Description:   Instruct learner(s) on prescribed precautions during self-care and functional transfers.              Learning Progress Summary           Patient Acceptance, E,TB, VU by  at 2/15/2023 1539                   Point: Body mechanics (Done)     Description:   Instruct learner(s) on proper positioning and spine alignment during self-care, functional mobility activities and/or exercises.              Learning Progress Summary           Patient Acceptance, E,TB, VU by  at 2/15/2023 1539                               User Key     Initials Effective Dates Name Provider Type Discipline     06/16/21 -  Jenifer Hughes OT Occupational Therapist OT     09/22/22 -  Jordon Alanis OT Occupational Therapist OT              OT Recommendation and Plan  Planned Therapy Interventions (OT): BADL retraining, activity tolerance training, functional balance retraining, IADL retraining, occupation/activity based interventions, ROM/therapeutic exercise, strengthening exercise, transfer/mobility retraining  Therapy Frequency (OT): 5 times/wk  Plan of Care Review  Plan of Care Reviewed With: patient, spouse  Outcome Evaluation: Pt. reevaluated this date POD # 0 LUE rTSA. Pt. completes ambulatory transfer to and from bathroom w/ supervision, and all toileting ADLs with SBA. Moderate A provided for donning/doffing ice/sling this date. PROM of surgical shoulder completed to approx 90* flexion, and pt completed all other post-op exercises verbalizing and demoing proper understanding. Pt. will benefit from continued skilled occupational therapy to maximize surgical benefits and improve ADL function, recommend HHOT at d/c.     Time Calculation:    Time Calculation- OT     Row Name 02/15/23 1539             Time  Calculation- OT    OT Start Time 1451  -LS      OT Stop Time 1525  -LS      OT Time Calculation (min) 34 min  -LS      Total Timed Code Minutes- OT 15 minute(s)  -LS      OT Received On 02/15/23  -      OT - Next Appointment 02/17/23  -      OT Goal Re-Cert Due Date 03/01/23  -         Timed Charges    75373 - OT Self Care/Mgmt Minutes 15  -LS         Untimed Charges    OT Eval/Re-eval Minutes 19  -LS         Total Minutes    Timed Charges Total Minutes 15  -LS      Untimed Charges Total Minutes 19  -LS       Total Minutes 34  -LS            User Key  (r) = Recorded By, (t) = Taken By, (c) = Cosigned By    Initials Name Provider Type     Jordon Alanis OT Occupational Therapist              Therapy Charges for Today     Code Description Service Date Service Provider Modifiers Qty    61400177867 HC OT SELF CARE/MGMT/TRAIN EA 15 MIN 2/15/2023 Jordon Alanis OT GO 1    41588884615 HC OT RE-EVAL 2 2/15/2023 Jordon Alanis OT GO 1               Jordon Alanis OT  2/15/2023

## 2023-02-15 NOTE — ANESTHESIA POSTPROCEDURE EVALUATION
Patient: Judy Koehler    Procedure Summary     Date: 02/15/23 Room / Location: [facility] OR  / [facility] MAIN OR    Anesthesia Start: 0950 Anesthesia Stop: 1148    Procedure: TOTAL SHOULDER REVERSE ARTHROPLASTY (Left: Shoulder) Diagnosis:       Osteoarthritis of left glenohumeral joint      (Osteoarthritis of left glenohumeral joint [M19.012])    Surgeons: Willian Gomez MD Provider: Jerome Silverman MD    Anesthesia Type: general, regional ASA Status: 3          Anesthesia Type: general, regional    Vitals  Vitals Value Taken Time   /65 02/15/23 1328   Temp 97 °F (36.1 °C) 02/15/23 1328   Pulse 91 02/15/23 1331   Resp 12 02/15/23 1328   SpO2 97 % 02/15/23 1331   Vitals shown include unvalidated device data.        Post Anesthesia Care and Evaluation    Patient location during evaluation: PACU  Patient participation: complete - patient participated  Level of consciousness: awake and alert  Pain management: adequate    Airway patency: patent  Anesthetic complications: No anesthetic complications  PONV Status: none  Cardiovascular status: acceptable  Respiratory status: acceptable  Hydration status: acceptable

## 2023-02-15 NOTE — CONSULTS
[facility] Medicine Services   Consult Note    Patient Name: Judy Koehler  : 1960  MRN: [id number]  Primary Care Physician:  Debbie Phillips APRN  Referring Physician: Willian Gomez, *  Date of admission: 2/15/2023  Date and Time of Care:     Inpatient Hospitalist Consult  Consult performed by: Tom Goodrich MD  Consult ordered by: Willian Gomez MD          Subjective      Reason for Consult/ Chief Complaint: Medical management     Consult Requested By: Dr. Gomez    History of Present Illness: Judy Koehler is a 62 y.o. female patient is a 62-year-old female with history of diabetes, GERD, hypertension, dyslipidemia who was brought in for Left shoulder osteoarthritis underwent left shoulder total reverse arthroplasty.  Postop has some pain but denies any chest pain, shortness of breath, lightheadedness.  She is feeling well overall.      Personal History     Past Medical History:   Diagnosis Date   • Arthritis    • Diabetes mellitus (HCC)    • GERD (gastroesophageal reflux disease)    • Headache    • Histoplasmosis     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   • Hyperlipidemia    • Hypertension    • Murmur    • Seasonal allergies        Past Surgical History:   Procedure Laterality Date   • LUNG BIOPSY     • SUBTOTAL HYSTERECTOMY     • TONSILLECTOMY         Family History: family history includes Diabetes in her father; Heart disease in her mother; Hypertension in her mother; No Known Problems in her brother, brother, brother, brother, sister, sister, sister, and son. Otherwise pertinent FHx was reviewed and not pertinent to current issue.    Social History:  reports that she has been smoking cigarettes. She has never used smokeless tobacco. She reports that she does not currently use alcohol. Drug use questions deferred to the physician.    Home Medications:   B-12, Blood Glucose Monitor System, Insulin Pen Needle, Semaglutide (1 MG/DOSE), Triamcinolone Acetonide, acetaminophen, atorvastatin,  cetirizine, cyclobenzaprine, diphenhydrAMINE HCl, glimepiride, glucose blood, hydrOXYzine, lisinopril, pantoprazole, promethazine, pseudoephedrine, traMADol, and vitamin D    Allergies:  No Known Allergies      Objective      Vitals:  Temp:  [97 °F (36.1 °C)-97.6 °F (36.4 °C)] 97.5 °F (36.4 °C)  Heart Rate:  [85-95] 95  Resp:  [12-23] 15  BP: (101-130)/(59-82) 111/78  Flow (L/min):  [2-10] 10    Physical Exam   General: No acute distress  HEENT: neck supple, normal oral mucosa, no masses, no lymphadenopathy  Lungs: Clear bilaterally, no wheezing, No crackles, No Rhonchi. Equal excursions.   CV - Normal S1/S2, no murmur, Regular rate and rhythm   Abdomin - Soft, non-tender, non-distended, normal bowel sounds  Extremities - no edema, no erythema. Left shoulder in brace   Neuro - No focal weakness, normal sensation   Psych - Alert and oriented x3  Skin - no wounds or lesions.       Result Review    Result Review:  I have personally reviewed the results from the time of this admission to 2/15/2023 14:35 EST and agree with these findings:  [x]  Laboratory  [x]  Microbiology  [x]  Radiology  [x]  EKG/Telemetry   [x]  Cardiology/Vascular   []  Pathology  [x]  Old records  []  Other:  Most notable findings include:       Assessment & Plan        Active Hospital Problems:  Active Hospital Problems    Diagnosis    • **DJD of left shoulder      Plan:       Left shoulder OA  - s/p left total shoulder reverse arthroplasty  - pain control and DVT ppx per Ortho     DM-2 - hold oral meds. SSI while inpatient     HTN - Hold Lisinopril for now.     DL - Lipitor    GERD - PPI        This patient has been  and discussed with . 02/15/23      Signature: Electronically signed by Tom Goodrich MD, 02/15/23, 14:35 EST.  [facility] Hospitalist Team

## 2023-02-15 NOTE — OP NOTE
TOTAL SHOULDER REVERSE ARTHROPLASTY  Procedure Report    Patient Name:  Judy Koehler  YOB: 1960    Date of Surgery:  2/15/2023     Indications: This is a 62 y.o. female with left and a massive cuff tear shoulder pain.  Imaging demonstrated degenerative joint disease.  Treatment options were discussed.  They desired to proceed with reverse shoulder arthroplasty after discussing the risks including bleeding, scarring, infection, stiffness, nerve damage, tendon damage, artery damage, continued  pain, DVT, loss of life or limb, fracture, dislocation, and a need for further surgery.      Pre-op Diagnosis:   Osteoarthritis of left glenohumeral joint [M19.012]       Post-op Diagnosis:    Post-Op Diagnosis Codes:     * Osteoarthritis of left glenohumeral joint [M19.012]    Procedure/CPT® Codes: 28952    Procedure(s): Left  TOTAL SHOULDER REVERSE ARTHROPLASTY with subscapularis repair    Staff: Dimas Engle physician assistant    was responsible for performing the following activities: Retraction, Suction, Irrigation, Suturing, Closing and Placing Dressing and their skilled assistance was necessary for the success of this case.       Anesthesia: General with Block    Estimated Blood Loss: 200 mL    Implants:    Implant Name Type Inv. Item Serial No.  Lot No. LRB No. Used Action   SUT NONABS MAXBRAID/PE NMBR2 HC5 38IN AURA [id number] - [id number] Implant SUT NONABS MAXBRAID/PE NMBR2 HC5 38IN AURA [id number]  [employer] [id number] Left 4 Implanted   BASEPLT MARCY REV RSP TI [id number] - [id number] Implant BASEPLT MARCY REV RSP TI [id number]  [employer] [id number] Left 1 Implanted   SCRW BONE RSP LK 5X14MM - [id number] Implant SCRW BONE RSP LK 5X14MM  [employer] [id number] Left 1 Implanted   SCRW BONE RSP LK 5X22MM - [id number] Implant SCRW BONE RSP LK 5X22MM  [employer] [id number] Left 1 Implanted   SCRW BONE RSP LK 5X30MM - [id number] Implant SCRW BONE RSP LK 5X30MM  [employer] [id number]  Left 1 Implanted   SCRW BONE RSP LK 5X14MM - [id number] Implant SCRW BONE RSP LK 5X14MM  [employer] JOE [id number] Left 1 Implanted   SCRW BONE RSP LK 5X30MM - [id number] Implant SCRW BONE RSP LK 5X30MM  [employer] [id number] Left 1 Implanted   HD MARCY SHLDR REV MIN4 OFFST 32MM - [id number] Implant HD MARCY SHLDR REV MIN4 OFFST 32MM  [employer] [id number] Left 1 Implanted   STEM HUM SHLDR ALTIVATE/REVERSE SZ6 48MM SM SHT - [id number] Implant STEM HUM SHLDR ALTIVATE/REVERSE SZ6 48MM SM SHT  [employer] [id number] Left 1 Implanted   SPACR ALTIVATE REVERSE SM 8MM - [id number] Implant SPACR ALTIVATE REVERSE SM 8MM  [employer] [id number] Left 1 Implanted   INSRT SOCKT HUM/SHLDR ALTIVATE/REVERSE HXE/PE SZ32 SM - [id number] Implant INSRT SOCKT HUM/SHLDR ALTIVATE/REVERSE HXE/PE SZ32 SM  [employer] [id number] Left 1 Implanted       IVF: see anesthesia      Complications: None    Specimens:none    Description of Procedure: The patient's operative site was marked in the  preoperative holding area.  They received regional anesthesia and were brought to  the operating room and placed supine on a well-padded operating room table.  General anesthesia was administered.  Antibiotics were dosed.  A timeout was  taken, confirming the correct operative site and procedure.  They were placed in  the beach chair position.  The left shoulder was prepped and draped in the  standard surgical fashion.  SCDs were placed. A deltopectoral incision was marked and injected with local anesthetic.  The skin was opened.  Flaps were raised.  The interval was opened and the cephalic vein was protected.  Adhesions were released from the deltoid.  The circumflex vessels were identified and ligated with suture and cautery.  The rotator interval was opened and a retractor was placed.  The subscapularis was  Tenotomized and the capsule was released down to the 6 o'clock position on the  humeral head protecting the axillary nerve.  BRIAN Fisher  retractor was placed and an inferior and anterior capsular release was performed palpating and protecting the axillary  nerve with my finger at all times.  The shoulder was dislocated.  The biceps  was tenodesed to the upper biceps groove and circumferential osteophytes  were removed to identify the native head-neck junction.  The cut was made in 30  degrees of retroversion.  The glenoid was exposed after placing retractors.  The soft tissue was removed circumferentially.  The center point was marked and the glenoid was prepared in standard fashion.  The base plate was placed with good press-fit.  Four screws were placed with good purchase.  The 32-4 glenosphere was placed with good purchase and set screw placed.  The shoulder was dislocated and the canal prepared to a size 12.  The true  stem was placed with good press-fit and trialing demonstrated +8/0 thickness to offer the best restoration of joint stability, muscle tension and range of motion.  The true polyethylene was placed with similar range of motion and stability.  A 3-minute Betadine wash performed.  The wound was closed in layers with absorbable suture and skin glue.  A sterile dressing and sling were applied.  They were awakened and taken to the recovery room.  There were no complications.  I was present for all portions.  All counts were correct.   Good capillary refill was noted to the hand.      Willian Gomez MD     Date: 2/15/2023  Time: 11:26 EST

## 2023-02-15 NOTE — PLAN OF CARE
Goal Outcome Evaluation:  Plan of Care Reviewed With: patient, spouse             Pt. reevaluated this date POD # 0 LUE rTSA. Pt. completes ambulatory transfer to and from bathroom w/ supervision, and all toileting ADLs with SBA. Moderate A provided for donning/doffing ice/sling this date. PROM of surgical shoulder completed to approx 90* flexion, and pt completed all other post-op exercises verbalizing and demoing proper understanding. Pt. will benefit from continued skilled occupational therapy to maximize surgical benefits and improve ADL function, recommend HHOT at d/c.

## 2023-02-15 NOTE — ANESTHESIA PREPROCEDURE EVALUATION
Anesthesia Evaluation     NPO Solid Status: > 8 hours  NPO Liquid Status: > 2 hours           Airway   Mallampati: I  TM distance: >3 FB  Neck ROM: full  Dental - normal exam     Pulmonary - normal exam   (+) a smoker Current,   Cardiovascular - normal exam    (+) hypertension, hyperlipidemia,       Neuro/Psych  (+) headaches,    GI/Hepatic/Renal/Endo    (+) obesity,  GERD,  diabetes mellitus type 2,     Musculoskeletal     Abdominal  - normal exam   Substance History      OB/GYN          Other   arthritis,                    Anesthesia Plan    ASA 3     general and regional     (ISB for post op analgesia)  intravenous induction     Anesthetic plan, risks, benefits, and alternatives have been provided, discussed and informed consent has been obtained with: patient.    Plan discussed with CRNA and CAA.        CODE STATUS:

## 2023-02-16 ENCOUNTER — TELEPHONE (OUTPATIENT)
Dept: ORTHOPEDIC SURGERY | Facility: CLINIC | Age: 63
End: 2023-02-16

## 2023-02-16 ENCOUNTER — READMISSION MANAGEMENT (OUTPATIENT)
Dept: CALL CENTER | Facility: HOSPITAL | Age: 63
End: 2023-02-16
Payer: MEDICAID

## 2023-02-16 VITALS
OXYGEN SATURATION: 97 % | DIASTOLIC BLOOD PRESSURE: 67 MMHG | TEMPERATURE: 97.8 F | WEIGHT: 194 LBS | RESPIRATION RATE: 19 BRPM | HEIGHT: 65 IN | SYSTOLIC BLOOD PRESSURE: 100 MMHG | BODY MASS INDEX: 32.32 KG/M2 | HEART RATE: 79 BPM

## 2023-02-16 DIAGNOSIS — M19.012 OSTEOARTHRITIS OF LEFT GLENOHUMERAL JOINT: Primary | ICD-10-CM

## 2023-02-16 LAB
ANION GAP SERPL CALCULATED.3IONS-SCNC: 9 MMOL/L (ref 5–15)
BUN SERPL-MCNC: 18 MG/DL (ref 8–23)
BUN/CREAT SERPL: 15.4 (ref 7–25)
CALCIUM SPEC-SCNC: 8.3 MG/DL (ref 8.6–10.5)
CHLORIDE SERPL-SCNC: 101 MMOL/L (ref 98–107)
CO2 SERPL-SCNC: 25 MMOL/L (ref 22–29)
CREAT SERPL-MCNC: 1.17 MG/DL (ref 0.57–1)
EGFRCR SERPLBLD CKD-EPI 2021: 52.9 ML/MIN/1.73
GLUCOSE BLDC GLUCOMTR-MCNC: 133 MG/DL (ref 70–105)
GLUCOSE SERPL-MCNC: 266 MG/DL (ref 65–99)
POTASSIUM SERPL-SCNC: 4.4 MMOL/L (ref 3.5–5.2)
SODIUM SERPL-SCNC: 135 MMOL/L (ref 136–145)

## 2023-02-16 PROCEDURE — 25010000002 CEFAZOLIN PER 500 MG: Performed by: ORTHOPAEDIC SURGERY

## 2023-02-16 PROCEDURE — 82962 GLUCOSE BLOOD TEST: CPT

## 2023-02-16 PROCEDURE — 97110 THERAPEUTIC EXERCISES: CPT

## 2023-02-16 PROCEDURE — G0378 HOSPITAL OBSERVATION PER HR: HCPCS

## 2023-02-16 PROCEDURE — 97535 SELF CARE MNGMENT TRAINING: CPT

## 2023-02-16 RX ORDER — OXYCODONE HYDROCHLORIDE 5 MG/1
7.5 TABLET ORAL EVERY 4 HOURS PRN
Status: DISCONTINUED | OUTPATIENT
Start: 2023-02-16 | End: 2023-02-16 | Stop reason: HOSPADM

## 2023-02-16 RX ORDER — TRAMADOL HYDROCHLORIDE 50 MG/1
50 TABLET ORAL EVERY 6 HOURS PRN
Qty: 120 TABLET | Refills: 1 | Status: CANCELLED | OUTPATIENT
Start: 2023-02-16

## 2023-02-16 RX ORDER — NALOXONE HYDROCHLORIDE 4 MG/.1ML
1 SPRAY NASAL AS NEEDED
Qty: 2 EACH | Refills: 2 | Status: SHIPPED | OUTPATIENT
Start: 2023-02-16

## 2023-02-16 RX ORDER — OXYCODONE HYDROCHLORIDE AND ACETAMINOPHEN 5; 325 MG/1; MG/1
1 TABLET ORAL EVERY 6 HOURS PRN
Qty: 28 TABLET | Refills: 0 | Status: SHIPPED | OUTPATIENT
Start: 2023-02-16 | End: 2023-03-06

## 2023-02-16 RX ORDER — TRAMADOL HYDROCHLORIDE 50 MG/1
50 TABLET ORAL EVERY 6 HOURS PRN
Qty: 28 TABLET | Refills: 0 | Status: SHIPPED | OUTPATIENT
Start: 2023-02-16 | End: 2023-03-27

## 2023-02-16 RX ADMIN — PANTOPRAZOLE SODIUM 40 MG: 40 TABLET, DELAYED RELEASE ORAL at 08:05

## 2023-02-16 RX ADMIN — MELOXICAM 15 MG: 15 TABLET ORAL at 08:05

## 2023-02-16 RX ADMIN — CEFAZOLIN 2 G: 2 INJECTION, POWDER, FOR SOLUTION INTRAMUSCULAR; INTRAVENOUS at 02:58

## 2023-02-16 RX ADMIN — ATORVASTATIN CALCIUM 20 MG: 20 TABLET, FILM COATED ORAL at 08:05

## 2023-02-16 RX ADMIN — OXYCODONE HYDROCHLORIDE 10 MG: 5 TABLET ORAL at 08:05

## 2023-02-16 RX ADMIN — ASPIRIN 325 MG: 325 TABLET, COATED ORAL at 08:05

## 2023-02-16 NOTE — CASE MANAGEMENT/SOCIAL WORK
Case Management Discharge Note      Final Note: home    Provided Post Acute Provider List?: Yes  Post Acute Provider List: Outpatient Therapy  Method of Delivery: Telephone    Selected Continued Care - Discharged on 2/16/2023 Admission date: 2/15/2023 - Discharge disposition: Home or Self Care        Therapy Coordination complete.    Service Provider Selected Services Address Phone Fax Patient Preferred    [facility] Outpatient Physical Therapy [address] [place] [address] [phone] --               Transportation Services  Private: Car    Final Discharge Disposition Code: 01 - home or self-care

## 2023-02-16 NOTE — PLAN OF CARE
Goal Outcome Evaluation:  Plan of Care Reviewed With: patient        Progress: improving  Outcome Evaluation: Patient up to bathroom tonight, She has had little complaints of pain. plan of care is ongoing.   MOTHER IS RETURNING NURSE CALL

## 2023-02-16 NOTE — THERAPY TREATMENT NOTE
Subjective: Judy Koehler is s/p TSA, anticipating discharge this date.     Pain level: 3/10 VAS p/o shoulder    Objective: Pt states completing TSA HEP yesterday after therapy services without difficulty. OT reviewed HEP consisting of AROM of elbow/wrist and hand this date. Pt demos good understanding.     Pt dons/doffs abductor sling with Musa.  Completes UB dressing with hemiplegic techniques with Musa, and LB dressing with Mod I.      Pt completes PROM shoulder flexion 0 - 100 this date.    Education: Post-op shoulder precautions, self-care techniques and HEP. Handout provided in total joint handbook.     Assessment: Patient demonstrates good progression toward stated goals. Also with good understanding of HEP and shoulder precautions.  present and will be assisting at dc. Patient able to perform LB dressing with Mod I, UP dressing with Min A including for Ice and sling.  will assist with all dressing tasks at TN. Anticipate discharge home with family assistance this date.     Plan: Home Health OT/PT

## 2023-02-16 NOTE — TELEPHONE ENCOUNTER
Caller: Judy Koehler    Relationship: Self    Best call back number: [phone]    Requested Prescriptions:   Requested Prescriptions     Pending Prescriptions Disp Refills   • traMADol (ULTRAM) 50 MG tablet 120 tablet 1     Sig: Take 1 tablet by mouth Every 6 (Six) Hours As Needed for Moderate Pain. prn        Pharmacy where request should be sent: [facility]    Additional details provided by patient: PER PT [facility] TRAMADOL [facility] .     Does the patient have less than a 3 day supply:  [x] Yes  [] No    Would you like a call back once the refill request has been completed: [x] Yes [] No    If the office needs to give you a call back, can they leave a voicemail: [x] Yes [] No    Staci Bassett Rep   23 11:33 EST

## 2023-02-16 NOTE — CASE MANAGEMENT/SOCIAL WORK
Social Determinants of Health     Tobacco Use: High Risk   • Smoking Tobacco Use: Every Day   • Smokeless Tobacco Use: Never   • Passive Exposure: Not on file   Alcohol Use: Not At Risk   • Frequency of Alcohol Consumption: Never   • Average Number of Drinks: Patient does not drink   • Frequency of Binge Drinking: Never   Financial Resource Strain: Low Risk    • Difficulty of Paying Living Expenses: Not hard at all   Food Insecurity: No Food Insecurity   • Worried About Running Out of Food in the Last Year: Never true   • Ran Out of Food in the Last Year: Never true   Transportation Needs: No Transportation Needs   • Lack of Transportation (Medical): No   • Lack of Transportation (Non-Medical): No   Physical Activity: Inactive   • Days of Exercise per Week: 0 days   • Minutes of Exercise per Session: 0 min   Stress: No Stress Concern Present   • Feeling of Stress : Not at all   Social Connections: Unknown   • Frequency of Communication with Friends and Family: More than three times a week   • Frequency of Social Gatherings with Friends and Family: Once a week   • Attends Caodaism Services: Patient refused   • Active Member of Clubs or Organizations: Patient refused   • Attends Club or Organization Meetings: Patient refused   • Marital Status:    Intimate Partner Violence: Not on file   Depression: Not at risk   • PHQ-2 Score: 0   Housing Stability: Unknown   • Unable to Pay for Housing in the Last Year: No   • Number of Places Lived in the Last Year: Not on file   • Unstable Housing in the Last Year: No

## 2023-02-16 NOTE — PLAN OF CARE
Assessment: Patient demonstrates good progression toward stated goals. Also with good understanding of HEP and shoulder precautions.  present and will be assisting at dc. Patient able to perform LB dressing with Mod I, UP dressing with Min A including for Ice and sling.  will assist with all dressing tasks at dc. Anticipate discharge home with family assistance this date.      Plan: Home Health OT/PT

## 2023-02-16 NOTE — DISCHARGE SUMMARY
Date of Discharge:  2/16/2023    Discharge Diagnosis: Degenerative joint disease of left shoulder    Presenting Problem/History of Present Illness  Active Hospital Problems    Diagnosis  POA   • **DJD of left shoulder [M19.012]  Unknown      Resolved Hospital Problems   No resolved problems to display.        Hospital Course  Patient is a 62 y.o. female presented with left shoulder degenerative joint disease.  They underwent shoulder arthroplasty during admission and postop day 1 were tolerating diet and oral medication and pain was controlled    Procedures Performed: Left reverse total shoulder      Consults:   Consults     Date and Time Order Name Status Description    2/15/2023  1:47 PM Inpatient Hospitalist Consult Completed     2/15/2023 11:32 AM Inpatient Consult to Hospitalist              Condition on Discharge:  Improved    Vital Signs  Vitals:    02/15/23 2040 02/16/23 0028 02/16/23 0500 02/16/23 0725   BP: 102/68 100/64 93/58 100/67   BP Location: Right arm Right arm Right arm Right arm   Patient Position:  Lying Lying Lying   Pulse: 96 87 89 79   Resp: 18 16 16 19   Temp: 97.9 °F (36.6 °C) 97.8 °F (36.6 °C) 97.6 °F (36.4 °C) 97.8 °F (36.6 °C)   TempSrc: Oral Oral Oral Oral   SpO2: 96% 97% 95% 97%   Weight:       Height:           Physical Exam:  Dry dressing, Sensory and motor exam are intact all distributions, albeit decreased from the limb block yesterday.  Full range of motion at the wrist and fingers though.  Radial pulse is palpable and capillary refill is less than two seconds to all digits       Discharge Disposition  Home    Discharge Medications     Discharge Medications      New Medications      Instructions Start Date   naloxone 4 MG/0.1ML nasal spray  Commonly known as: NARCAN   1 spray, Nasal, As Needed      oxyCODONE-acetaminophen 5-325 MG per tablet  Commonly known as: Percocet   1 tablet, Oral, Every 6 Hours PRN         Continue These Medications      Instructions Start Date    acetaminophen 325 MG tablet  Commonly known as: TYLENOL   650 mg, Oral, Every 6 Hours PRN      atorvastatin 20 MG tablet  Commonly known as: LIPITOR   20 mg, Oral, Daily      B-12 1000 MCG capsule   Oral      B-D UF III MINI PEN NEEDLES 31G X 5 MM misc  Generic drug: Insulin Pen Needle   USE AS DIRECTED      BENADRYL ALLERGY PO   Oral      Blood Glucose Monitor System w/Device kit   Check blood sugars tid      cetirizine 10 MG tablet  Commonly known as: ZyrTEC Allergy   10 mg, Oral, Daily      cyclobenzaprine 10 MG tablet  Commonly known as: FLEXERIL   10 mg, Oral, 3 Times Daily PRN      glucose blood test strip   Check blood sugar tid      hydrOXYzine 50 MG tablet  Commonly known as: ATARAX   50 mg, Oral, 3 Times Daily PRN      Ozempic (1 MG/DOSE) 4 MG/3ML solution pen-injector  Generic drug: Semaglutide (1 MG/DOSE)   1 mg, Subcutaneous, Every 7 Days      pantoprazole 40 MG EC tablet  Commonly known as: PROTONIX   40 mg, Oral, Daily      promethazine 12.5 MG tablet  Commonly known as: PHENERGAN   1-2 q6h prn      pseudoephedrine 30 MG tablet  Commonly known as: Sudafed   30 mg, Oral, Every 4 Hours PRN      traMADol 50 MG tablet  Commonly known as: ULTRAM   50 mg, Oral, Every 6 Hours PRN, prn      Triamcinolone Acetonide 55 MCG/ACT nasal inhaler  Commonly known as: Nasacort Allergy 24HR   2 sprays, Nasal, Daily      vitamin D 1.25 MG (23862 UT) capsule capsule  Commonly known as: ERGOCALCIFEROL   50,000 Units, Oral, Weekly         ASK your doctor about these medications      Instructions Start Date   glimepiride 4 MG tablet  Commonly known as: AMARYL   4 mg, Oral, Daily      lisinopril 20 MG tablet  Commonly known as: PRINIVIL,ZESTRIL   20 mg, Oral, Daily               Discharge instructions:  Continue wearing sling.  Keep dressing on.  Okay to shower and perform home exercises.  Continue polar care.    Follow-up Appointments  Future Appointments   Date Time Provider Department Center   3/2/2023  1:45 PM Patricia  Willian Laird MD [facility]   5/23/2023  8:00 AM Debbie Phillips APRN [facility] [place] PA-C  02/16/23  08:33 EST      Disclaimer: Part of this note may be an electronic transcription/translation of spoken language to printed text using the Dragon Dictation System

## 2023-02-16 NOTE — SIGNIFICANT NOTE
02/16/23 0737   OTHER   Discipline physical therapist   Rehab Time/Intention   Session Not Performed other (see comments)  (Per OT, pt is independent with mobility and has no PT needs.)   Therapy Assessment/Plan (PT)   Criteria for Skilled Interventions Met (PT) no problems identified which require skilled intervention

## 2023-02-16 NOTE — CASE MANAGEMENT/SOCIAL WORK
Discharge Planning Assessment   [facility]     Patient Name: Judy Koehler  MRN: [id number]  Today's Date: 2/16/2023    Admit Date: 2/15/2023    Plan: HOme with family. OP PT at [facility] 02/20/23 at 1045 . Family to transport patient   Discharge Needs Assessment     Row Name 02/16/23 1647       Living Environment    People in Home spouse    Current Living Arrangements home    Primary Care Provided by self    Provides Primary Care For no one    Family Caregiver if Needed spouse    Quality of Family Relationships supportive    Able to Return to Prior Arrangements yes       Resource/Environmental Concerns    Resource/Environmental Concerns none    Transportation Concerns none       Transition Planning    Patient/Family Anticipates Transition to home with family    Patient/Family Anticipated Services at Transition outpatient care    Transportation Anticipated family or friend will provide       Discharge Needs Assessment    Readmission Within the Last 30 Days no previous admission in last 30 days    Equipment Currently Used at Home none    Concerns to be Addressed care coordination/care conferences;discharge planning    Anticipated Changes Related to Illness none    Equipment Needed After Discharge sling    Outpatient/Agency/Support Group Needs outpatient therapy    Provided Post Acute Provider List? Yes    Post Acute Provider List Outpatient Therapy    Method of Delivery Telephone    Patient's Choice of Community Agency(s) [facility] OP PT in [place]               Discharge Plan     Row Name 02/16/23 1649       Plan    Plan HOme with family. OP PT at [facility] 02/20/23 at 1045 . Family to transport patient    Patient/Family in Agreement with Plan yes    Plan Comments Met with patient at bedside, Confirmed pcp and pharmacy,  Lives at home with spouse who is able to transport at [place]              Continued Care and Services - Discharged on 2/16/2023 Admission date: 2/15/2023 - Discharge disposition: Home or  Self Care              Therapy Coordination complete.    Service Provider Request Status Selected Services Address Phone Fax Patient Preferred    [facility]  Selected Outpatient Physical Therapy [address] [place] [address] [phone] --              Expected Discharge Date and Time     Expected Discharge Date Expected Discharge Time    Feb 16, 2023          Demographic Summary     Row Name 02/16/23 1647       General Information    Admission Type observation    Arrived From home    Referral Source admission list    Reason for Consult discharge planning    Preferred Language English       Contact Information    Permission Granted to Share Info With                Functional Status     Row Name 02/16/23 1647       Functional Status    Usual Activity Tolerance good    Current Activity Tolerance moderate       Functional Status, IADL    Medications independent    Meal Preparation independent    Housekeeping independent    Laundry independent    Shopping independent       Mental Status    General Appearance WDL WDL       Mental Status Summary    Recent Changes in Mental Status/Cognitive Functioning no changes                         Sita Vázquez, DAJUAN   Phone communication or documentation only - no physical contact with patient or family.

## 2023-02-16 NOTE — OUTREACH NOTE
Prep Survey    Flowsheet Row Responses   [facility] patient discharged from? [facility]   Is LACE score < 7 ? Yes   Eligibility [facility]   Date of Admission 02/15/23   Date of Discharge 02/16/23   Discharge Disposition Home or Self Care   Discharge diagnosis TOTAL SHOULDER REVERSE ARTHROPLASTY Left   Does the patient have one of the following disease processes/diagnoses(primary or secondary)? Total Joint Replacement   Does the patient have Home health ordered? No  [OP PT at [facility] 02/20/23 at 1045]   Is there a DME ordered? No   Prep survey completed? Yes          Amy THOMAS - Registered Nurse

## 2023-02-16 NOTE — PLAN OF CARE
Goal Outcome Evaluation:  Plan of Care Reviewed With: patient        Progress: improving     Patient up to bathroom, makes needs known, education regarding exercises with PT, family at bedside

## 2023-02-17 ENCOUNTER — TRANSITIONAL CARE MANAGEMENT TELEPHONE ENCOUNTER (OUTPATIENT)
Dept: CALL CENTER | Facility: HOSPITAL | Age: 63
End: 2023-02-17
Payer: MEDICAID

## 2023-02-17 NOTE — OUTREACH NOTE
Call Center TCM Note    Flowsheet Row Responses   [facility] patient discharged from? [facility]   Does the patient have one of the following disease processes/diagnoses(primary or secondary)? Total Joint Replacement   Joint surgery performed? Shoulder   TCM attempt successful? Yes  [Davian-spouse]   Call start time 1159   Call end time 1204   Discharge diagnosis TOTAL SHOULDER REVERSE ARTHROPLASTY Left   Does the patient have all medications related to this admission filled (includes all antibiotics, pain medications, etc.) Yes   Is the patient taking all medications as directed (includes completed medication regime)? Yes   Is the patient able to teach back alternate methods of pain control? [facility] d/c f/u is on 2/20/23 at 10:30 AM   Does the patient have an appointment with their PCP within 7 days of discharge? Yes   Psychosocial issues? No   Has the patient began therapy sessions (either in the home or as an out patient)? No   If the patient has started attending therapy, what post op day did they begin to attend (either in home or as an out patient)?   Will start on 2/20/23   Has the patient fallen since discharge? No   Did the patient receive a copy of their discharge instructions? Yes   Nursing interventions Reviewed instructions with patient   What is the patient's perception of their functional status since discharge? Same   Is the patient able to teach back signs and symptoms of infection? Temp >100.4 for 24h or longer   Is the patient able to teach back home safety measures? Modifications with ADLs such as dressing, cooking, toileting   If the patient is a current smoker, are they able to teach back resources for cessation? Smoking cessation medications   Is the patient/caregiver able to teach back the hierarchy of who to call/visit for symptoms/problems? PCP, Specialist, Home health nurse, Urgent Care, ED, 911 Yes   TCM call completed? Yes   Call end time 1204   Would this patient  benefit from a Referral to [facility] Social Work? No   Is the patient interested in additional calls from an ambulatory ?  NOTE:  applies to high risk patients requiring additional follow-up. Delicia Gomez RN    2/17/2023, 12:05 EST       yes

## 2023-02-20 ENCOUNTER — OFFICE VISIT (OUTPATIENT)
Dept: FAMILY MEDICINE CLINIC | Facility: CLINIC | Age: 63
End: 2023-02-20
Payer: MEDICAID

## 2023-02-20 VITALS
DIASTOLIC BLOOD PRESSURE: 75 MMHG | SYSTOLIC BLOOD PRESSURE: 114 MMHG | HEART RATE: 75 BPM | TEMPERATURE: 97.4 F | WEIGHT: 202.2 LBS | OXYGEN SATURATION: 99 % | HEIGHT: 65 IN | BODY MASS INDEX: 33.69 KG/M2

## 2023-02-20 DIAGNOSIS — Z72.0 TOBACCO ABUSE: ICD-10-CM

## 2023-02-20 DIAGNOSIS — M19.012 OSTEOARTHRITIS OF LEFT SHOULDER, UNSPECIFIED OSTEOARTHRITIS TYPE: ICD-10-CM

## 2023-02-20 DIAGNOSIS — Z96.612 S/P REVERSE TOTAL SHOULDER ARTHROPLASTY, LEFT: ICD-10-CM

## 2023-02-20 DIAGNOSIS — K59.03 DRUG-INDUCED CONSTIPATION: ICD-10-CM

## 2023-02-20 DIAGNOSIS — E11.9 TYPE 2 DIABETES MELLITUS WITHOUT COMPLICATION, WITHOUT LONG-TERM CURRENT USE OF INSULIN: Primary | ICD-10-CM

## 2023-02-20 PROCEDURE — T1015 CLINIC SERVICE: HCPCS | Performed by: NURSE PRACTITIONER

## 2023-02-20 PROCEDURE — 99214 OFFICE O/P EST MOD 30 MIN: CPT | Performed by: NURSE PRACTITIONER

## 2023-02-20 PROCEDURE — 3044F HG A1C LEVEL LT 7.0%: CPT | Performed by: NURSE PRACTITIONER

## 2023-02-20 NOTE — PROGRESS NOTES
Judy Koehler is a 62 y.o. female.     History of Present Illness  62-year-old white female smoker with history of migraine headaches, GERD, partial hysterectomy, type 2 diabetes, hyperlipidemia, insomnia, IBS and recent left total shoulder repair on February 15, 2022 who comes in today for follow-up visit.  Patient was discharged on February 16, 2022 and is doing quite well.  She starts her therapy tomorrow and she has a follow-up visit scheduled on March 2.  Patient states pain is very well controlled she is still wearing her sling still has ice packs on the sling and has been doing the exercises they advised her to do at home    Blood pressure is 114/74 heart rate 72 she denies any chest pain, dyspnea, tachycardia or dizziness.    On last visit I placed patient on hydroxyzine for sleep however shortly took 1 tablet so I advised her to take 2 or 3 if needed to see if it helps her sleep and to let me know    Patient currently on Ozempic and patient states she has been losing weight however is difficult to tell today because of braces very heavy.   apparently sugars have mainly been under 120 I told her if they continue to drop to cut her Amaryl dose to 2 mg daily and eventually we will try to wean off    She also complains of constipation postop.  I told her to get Colace 100 mg twice a day and MiraLAX to help her bowels get moving again            MiraLAX as needed daily for constipation  Colace 100 mg twice a day  Keep appointment for follow-up visit in March  Keep appointment for therapy tomorrow  Wear brace as directed  If blood sugars begin to drop start weaning off of Amaryl as discussed during exam       The following portions of the patient's history were reviewed and updated as appropriate: allergies, current medications, past family history, past medical history, past social history, past surgical history and problem list.    Vitals:    02/20/23 1019   BP: 114/75   Pulse: 75   Temp: 97.4 °F (36.3 °C)  "  SpO2: 99%   Weight: 91.7 kg (202 lb 3.2 oz)   Height: 165.1 cm (65\")     Body mass index is 33.65 kg/m².    Past Medical History:   Diagnosis Date   • Allergic    • Arthritis    • Diabetes mellitus (HCC)    • GERD (gastroesophageal reflux disease)    • Headache    • Histoplasmosis     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   • Hyperlipidemia    • Hypertension    • Irritable bowel syndrome    • Low back pain    • Murmur    • Obesity    • Seasonal allergies      Past Surgical History:   Procedure Laterality Date   •  SECTION     • COLONOSCOPY     • JOINT REPLACEMENT  2023    Left shoulder   • LUNG BIOPSY     • SUBTOTAL HYSTERECTOMY     • TONSILLECTOMY     • TOTAL SHOULDER ARTHROPLASTY W/ DISTAL CLAVICLE EXCISION Left 2/15/2023    Procedure: TOTAL SHOULDER REVERSE ARTHROPLASTY;  Surgeon: Willian Gomez MD;  Location: [facility] OR;  Service: Orthopedics;  Laterality: Left;     Family History   Problem Relation Age of Onset   • Hypertension Mother    • Heart disease Mother    • Arthritis Mother    • Cancer Mother    • Thyroid disease Mother    • Diabetes Father    • Hyperlipidemia Father    • No Known Problems Sister    • No Known Problems Sister    • No Known Problems Sister    • No Known Problems Brother    • No Known Problems Brother    • No Known Problems Brother    • No Known Problems Brother    • No Known Problems Son      Immunization History   Administered Date(s) Administered   • COVID-19 (PFIZER) PURPLE CAP 2021, 2021   • Covid-19 (Pfizer) Gray Cap 2022   • FluLaval/Fluzone >6mos 10/11/2017, 2018, 2019, 10/02/2020, 10/13/2021, 2022   • Influenza, Unspecified 2019, 10/02/2020, 10/13/2021   • Pneumococcal Polysaccharide (PPSV23) 2017   • Tdap 2012, 2013       Admission on 02/15/2023, Discharged on 2023   Component Date Value Ref Range Status   • Glucose 02/15/2023 113 (H)  70 - 105 mg/dL Final    Serial Number: [id number]:  [id number]   • " Glucose 02/15/2023 152 (H)  70 - 105 mg/dL Final    Serial Number: [id number]:  [id number]   • Glucose 02/15/2023 380 (H)  70 - 105 mg/dL Final    Serial Number: [id number]:  [id number]   • Glucose 02/15/2023 370 (H)  70 - 105 mg/dL Final    Serial Number: [id number]:  [id number]   • Glucose 02/15/2023 266 (H)  65 - 99 mg/dL Final   • BUN 02/15/2023 18  8 - 23 mg/dL Final   • Creatinine 02/15/2023 1.17 (H)  0.57 - 1.00 mg/dL Final   • Sodium 02/15/2023 135 (L)  136 - 145 mmol/L Final   • Potassium 02/15/2023 4.4  3.5 - 5.2 mmol/L Final   • Chloride 02/15/2023 101  98 - 107 mmol/L Final   • CO2 02/15/2023 25.0  22.0 - 29.0 mmol/L Final   • Calcium 02/15/2023 8.3 (L)  8.6 - 10.5 mg/dL Final   • BUN/Creatinine Ratio 02/15/2023 15.4  7.0 - 25.0 Final   • Anion Gap 02/15/2023 9.0  5.0 - 15.0 mmol/L Final   • eGFR 02/15/2023 52.9 (L)  >60.0 mL/min/1.73 Final   • Hemoglobin 02/15/2023 13.6  12.0 - 15.9 g/dL Final   • Hematocrit 02/15/2023 40.7  34.0 - 46.6 % Final   • Glucose 02/16/2023 133 (H)  70 - 105 mg/dL Final    Serial Number: [id number]:  [id number]         Review of Systems   Constitutional: Negative.    HENT: Negative.    Respiratory: Negative.    Cardiovascular: Negative.    Gastrointestinal: Negative.    Genitourinary: Negative.    Musculoskeletal:        Shoulder pain   Skin: Negative.    Neurological: Negative.    Psychiatric/Behavioral: Negative.        Objective   Physical Exam  Constitutional:       Appearance: Normal appearance.   HENT:      Head: Normocephalic.   Cardiovascular:      Rate and Rhythm: Normal rate and regular rhythm.      Pulses: Normal pulses.      Heart sounds: Normal heart sounds.   Pulmonary:      Effort: Pulmonary effort is normal.      Breath sounds: Normal breath sounds.   Musculoskeletal:      Comments: Patient states pain is controlled wearing brace with ice packs   Skin:     General: Skin is warm and dry.   Neurological:      General: No focal deficit  present.      Mental Status: She is alert and oriented to person, place, and time.   Psychiatric:         Mood and Affect: Mood normal.         Behavior: Behavior normal.         Procedures    Assessment & Plan   Diagnoses and all orders for this visit:    1. Type 2 diabetes mellitus without complication, without long-term current use of insulin (HCC) (Primary)    2. Osteoarthritis of left shoulder, unspecified osteoarthritis type    3. Tobacco abuse    4. S/P reverse total shoulder arthroplasty, left    5. Drug-induced constipation          Current Outpatient Medications:   •  acetaminophen (TYLENOL) 325 MG tablet, Take 650 mg by mouth Every 6 (Six) Hours As Needed for Mild Pain., Disp: , Rfl:   •  atorvastatin (LIPITOR) 20 MG tablet, Take 20 mg by mouth Daily., Disp: , Rfl:   •  B-D UF III MINI PEN NEEDLES 31G X 5 MM misc, USE AS DIRECTED, Disp: 100 each, Rfl: 6  •  Blood Glucose Monitoring Suppl (Blood Glucose Monitor System) w/Device kit, Check blood sugars tid, Disp: 1 each, Rfl: 0  •  cetirizine (ZyrTEC Allergy) 10 MG tablet, Take 1 tablet by mouth Daily., Disp: 30 tablet, Rfl: 2  •  Cyanocobalamin (B-12) 1000 MCG capsule, Take  by mouth., Disp: , Rfl:   •  cyclobenzaprine (FLEXERIL) 10 MG tablet, Take 10 mg by mouth 3 (Three) Times a Day As Needed for Muscle Spasms., Disp: , Rfl:   •  diphenhydrAMINE HCl (BENADRYL ALLERGY PO), Take  by mouth., Disp: , Rfl:   •  glimepiride (AMARYL) 4 MG tablet, Take 1 tablet by mouth Daily. (Patient taking differently: Take 4 mg by mouth Daily. HOLD DOS), Disp: 90 tablet, Rfl: 1  •  glucose blood test strip, Check blood sugar tid, Disp: 100 each, Rfl: 12  •  hydrOXYzine (ATARAX) 50 MG tablet, Take 1 tablet by mouth 3 (Three) Times a Day As Needed for Itching., Disp: 30 tablet, Rfl: 0  •  lisinopril (PRINIVIL,ZESTRIL) 20 MG tablet, Take 1 tablet by mouth Daily. (Patient taking differently: Take 20 mg by mouth Daily. HOLD 24 hours before surgery), Disp: 90 tablet, Rfl: 0  •   naloxone (NARCAN) 4 MG/0.1ML nasal spray, 1 spray into the nostril(s) as directed by provider As Needed (per overdose)., Disp: 2 each, Rfl: 2  •  oxyCODONE-acetaminophen (Percocet) 5-325 MG per tablet, Take 1 tablet by mouth Every 6 (Six) Hours As Needed for Moderate Pain., Disp: 28 tablet, Rfl: 0  •  pantoprazole (PROTONIX) 40 MG EC tablet, Take 1 tablet by mouth Daily., Disp: 90 tablet, Rfl: 1  •  promethazine (PHENERGAN) 12.5 MG tablet, 1-2 q6h prn, Disp: 60 tablet, Rfl: 2  •  pseudoephedrine (Sudafed) 30 MG tablet, Take 1 tablet by mouth Every 4 (Four) Hours As Needed for Congestion., Disp: 60 tablet, Rfl: 2  •  Semaglutide, 1 MG/DOSE, (Ozempic, 1 MG/DOSE,) 4 MG/3ML solution pen-injector, Inject 1 mg under the skin into the appropriate area as directed Every 7 (Seven) Days., Disp: 3 mL, Rfl: 2  •  traMADol (ULTRAM) 50 MG tablet, Take 1 tablet by mouth Every 6 (Six) Hours As Needed for Moderate Pain. prn, Disp: 120 tablet, Rfl: 1  •  traMADol (ULTRAM) 50 MG tablet, Take 1 tablet by mouth Every 6 (Six) Hours As Needed for Moderate Pain., Disp: 28 tablet, Rfl: 0  •  Triamcinolone Acetonide (Nasacort Allergy 24HR) 55 MCG/ACT nasal inhaler, 2 sprays into the nostril(s) as directed by provider Daily., Disp: 16.5 g, Rfl: 2  •  vitamin D (ERGOCALCIFEROL) 1.25 MG (04086 UT) capsule capsule, Take 1 capsule by mouth 1 (One) Time Per Week., Disp: 12 capsule, Rfl: 1           Debbie Phillips, APRN 2/20/2023 10:42 EST  This note has been electronically signed

## 2023-02-20 NOTE — PATIENT INSTRUCTIONS
MiraLAX as needed daily for constipation  Colace 100 mg twice a day  Keep appointment for follow-up visit in March  Keep appointment for therapy tomorrow  Wear brace as directed  If blood sugars begin to drop start weaning off of Amaryl as discussed during exam

## 2023-03-06 ENCOUNTER — OFFICE VISIT (OUTPATIENT)
Dept: ORTHOPEDIC SURGERY | Facility: CLINIC | Age: 63
End: 2023-03-06
Payer: MEDICAID

## 2023-03-06 VITALS — OXYGEN SATURATION: 96 % | HEIGHT: 65 IN | WEIGHT: 202 LBS | BODY MASS INDEX: 33.66 KG/M2

## 2023-03-06 DIAGNOSIS — Z47.89 ORTHOPEDIC AFTERCARE: Primary | ICD-10-CM

## 2023-03-06 PROCEDURE — 99024 POSTOP FOLLOW-UP VISIT: CPT | Performed by: ORTHOPAEDIC SURGERY

## 2023-03-06 RX ORDER — TRAMADOL HYDROCHLORIDE 50 MG/1
50 TABLET ORAL EVERY 6 HOURS PRN
Qty: 28 TABLET | Refills: 0 | Status: SHIPPED | OUTPATIENT
Start: 2023-03-06

## 2023-03-06 RX ORDER — LISINOPRIL 20 MG/1
TABLET ORAL
Qty: 90 TABLET | Refills: 0 | Status: SHIPPED | OUTPATIENT
Start: 2023-03-06

## 2023-03-06 NOTE — PATIENT INSTRUCTIONS
Shoulder Arthroplasty: Post- Operative Visit Objectives    Review the operative findings, procedures and photos.  Make sure medications are effective and not causing problems.  Pain: Oxycodone or hydrocodone is for pain. You may take 1 tablet every 6 hours as necessary.  Some patients don’t require the use of these…in those circumstances just use Tylenol extra-strength 1 or 2 tablets every 4-6 hours.   NSAIDs. For pain and inflammation.  You can take an over the counter anti-inflammatory of choice such as Aleve, Ibuprofen, Motrin or Advil during this time.  If you have had any problems stop taking these medicines and please tell us!  Wound Care:  Today we will remove your dressings.  There may be some residual glue on your incision.  It is now ok to shower without covering it. Please avoid submerging the incision for another two weeks.  Continue ice pack as needed.  Exercises and Physical Therapy   Continue your physical therapy and daily home exercises focusing especially on overhead motion.  Continue the sling and remove for activities such as showering and bringing you hand to your face or hair, no motion behind your back for the next 4 weeks.  Some shoulder replacements with a rotator cuff repair will have more restrictions and we will go over those.   Follow Up appointments Schedule Follow up visits as directed usually in 4 weeks..  Notes  Make sure you have all necessary notes and documentation for school or work.  Issues: Remember our goal is to make this process smooth and easy if there is any thing you need please ask us or call back [phone]

## 2023-03-06 NOTE — PROGRESS NOTES
Patient ID: Judy Koehler is a 62 y.o. female.  2/15/23 left reverse total shoulder with subscapularis repair  Pain controlled    Objective:    There were no vitals taken for this visit.    Physical Examination:    Incision well approximated no sign of infection sensory and motor exam are intact all distributions. Radial pulse is palpable and capillary refill is less than two seconds to all digits.      Imaging:    left Shoulder X-Ray  Indication: Postop total shoulder  AP Y and Lateral views  Findings: Reverse total shoulder in position  no bony lesion  Soft tissues normal  not examined joint spaces  Hardware appropriately positioned yes      no prior studies available for comparison.  Assessment:  Doing well after shoulder replacement    Plan:  Continue sling and therapy see me in 3 weeks

## 2023-03-07 ENCOUNTER — TELEPHONE (OUTPATIENT)
Dept: ORTHOPEDIC SURGERY | Facility: CLINIC | Age: 63
End: 2023-03-07

## 2023-03-07 NOTE — TELEPHONE ENCOUNTER
Provider: DEV HILL MD  Caller: AHRIS LAGUNA   Relationship to Patient:PATIENT   Pharmacy:[facility] [place]  Phone Number: [phone]  Reason for Call: RX   traMADol (ULTRAM) 50 MG tablet 50 mg Oral Every 6 Hours PRN 20 MME/Day     PHARMACY NEEDS AUTHORIZATION   When was the patient last seen: 03/06/23    PLEASE CALL PATIENT WHEN ORDER COMPLETE OKAY TO M

## 2023-03-24 ENCOUNTER — OFFICE VISIT (OUTPATIENT)
Dept: FAMILY MEDICINE CLINIC | Facility: CLINIC | Age: 63
End: 2023-03-24
Payer: MEDICAID

## 2023-03-24 VITALS
WEIGHT: 198.6 LBS | DIASTOLIC BLOOD PRESSURE: 72 MMHG | OXYGEN SATURATION: 100 % | BODY MASS INDEX: 33.09 KG/M2 | HEART RATE: 80 BPM | HEIGHT: 65 IN | TEMPERATURE: 97.5 F | SYSTOLIC BLOOD PRESSURE: 118 MMHG

## 2023-03-24 DIAGNOSIS — S39.011A FLANK STRAIN, INITIAL ENCOUNTER: Primary | ICD-10-CM

## 2023-03-24 RX ORDER — CYCLOBENZAPRINE HCL 10 MG
10 TABLET ORAL 3 TIMES DAILY PRN
Qty: 30 TABLET | Refills: 0 | Status: SHIPPED | OUTPATIENT
Start: 2023-03-24

## 2023-03-24 NOTE — PROGRESS NOTES
"Chief Complaint  Rib Pain (ON RIGHT) and Muscle Pain    Subjective          Judy Koehler presents to [facility]      History of Present Illness    Judy is a 62 year old female patient Phillips who presents with right rib muscle pain.    Saturday she was volunteering at [employer] and was lifting bread products with right hand. She is right hand dominant. She is currently in a sling for left total shoulder replacement four weeks ago. She is limited in her . Pain has gotten worse in right flank/ribs.  She is currently limited in mobility and range of motion.  She denies SOB or pain with inspiration expiration.  She does report right rib area is sore and hurts when she lays on her side. She taking tramadol PRN for her shoulder. It is not helping with pain in the right flank rib area.     She goes to see Dr. Gomez on Monday for left shoulder follow-up.         Objective     Vital Signs:   /72 (BP Location: Right arm, Patient Position: Sitting, Cuff Size: Adult)   Pulse 80   Temp 97.5 °F (36.4 °C) (Oral)   Ht 165.1 cm (65\")   Wt 90.1 kg (198 lb 9.6 oz)   SpO2 100%   BMI 33.05 kg/m²           Physical Exam  Vitals reviewed.   Constitutional:       Appearance: She is well-developed.      Comments: Wearing a face mask     HENT:      Head: Normocephalic and atraumatic.      Nose: Nose normal.      Mouth/Throat:      Mouth: Mucous membranes are moist.      Pharynx: Oropharynx is clear.   Eyes:      Conjunctiva/sclera: Conjunctivae normal.      Pupils: Pupils are equal, round, and reactive to light.   Cardiovascular:      Rate and Rhythm: Normal rate.      Pulses: Normal pulses.      Heart sounds: Normal heart sounds. No murmur heard.  Pulmonary:      Effort: Pulmonary effort is normal. No respiratory distress.      Breath sounds: Normal breath sounds.   Chest:      Chest wall: Tenderness present.          Comments: Anterior posterior right rib/flank " tenderness  Musculoskeletal:         General: Normal range of motion.      Cervical back: Normal range of motion.        Back:    Skin:     General: Skin is warm and dry.      Findings: No rash.   Neurological:      Mental Status: She is alert and oriented to person, place, and time.   Psychiatric:         Behavior: Behavior normal.                Result Review :                                   Assessment and Plan      Diagnoses and all orders for this visit:    1. Flank strain, initial encounter (Primary)    Other orders  -     cyclobenzaprine (FLEXERIL) 10 MG tablet; Take 1 tablet by mouth 3 (Three) Times a Day As Needed for Muscle Spasms.  Dispense: 30 tablet; Refill: 0      Patient with flank/rib strain.  Will treat with Flexeril, advised heat and cold application 10 to 15 minutes 3-4 times daily as needed.  Patient to follow-up if no improvement in the next 5 to 7 days            Follow Up       No follow-ups on file.      Patient was given instructions and counseling regarding her condition or for health maintenance advice. Please see specific information pulled into the AVS if appropriate.     Brenda Mota, [id number]/[id number] EDT  This note has been electronically signed      Answers for HPI/ROS submitted by the patient on 3/22/2023  Please describe your symptoms.: My right side I either pulled something or broke a rib, It hurts when I move,cough or sneeze  Have you had these symptoms before?: No  How long have you been having these symptoms?: 1-4 days  Please describe any probable cause for these symptoms. : I was bent over trying to get something out of a crate  What is the primary reason for your visit?: Other

## 2023-03-27 ENCOUNTER — OFFICE VISIT (OUTPATIENT)
Dept: ORTHOPEDIC SURGERY | Facility: CLINIC | Age: 63
End: 2023-03-27
Payer: MEDICAID

## 2023-03-27 VITALS — WEIGHT: 198 LBS | HEIGHT: 65 IN | HEART RATE: 88 BPM | BODY MASS INDEX: 32.99 KG/M2

## 2023-03-27 DIAGNOSIS — Z47.89 ORTHOPEDIC AFTERCARE: Primary | ICD-10-CM

## 2023-03-27 PROCEDURE — 99024 POSTOP FOLLOW-UP VISIT: CPT | Performed by: ORTHOPAEDIC SURGERY

## 2023-03-27 NOTE — PROGRESS NOTES
"     Patient ID: Judy Koehler is a 62 y.o. female.  2/15/23 left reverse total shoulder with subscapularis repair  Pain controlled      Objective:    Pulse 88   Ht 165.1 cm (65\")   Wt 89.8 kg (198 lb)   BMI 32.95 kg/m²     Physical Examination:  Incision healed passive elevation 130 external rotation 30       Imaging:      Assessment:  Doing well after shoulder arthroplasty    Plan:  Discontinue sling finish out formal therapy gradual activity as tolerated see me in 4 months with x-ray  "

## 2023-03-30 RX ORDER — ATORVASTATIN CALCIUM 20 MG/1
20 TABLET, FILM COATED ORAL DAILY
Qty: 90 TABLET | Refills: 1 | Status: SHIPPED | OUTPATIENT
Start: 2023-03-30

## 2023-03-30 RX ORDER — HYDROXYZINE 50 MG/1
TABLET, FILM COATED ORAL
Qty: 90 TABLET | Refills: 1 | Status: SHIPPED | OUTPATIENT
Start: 2023-03-30

## 2023-04-05 DIAGNOSIS — Z12.31 OTHER SCREENING MAMMOGRAM: ICD-10-CM

## 2023-04-05 DIAGNOSIS — Z78.0 POSTMENOPAUSAL: ICD-10-CM

## 2023-04-06 RX ORDER — SEMAGLUTIDE 1.34 MG/ML
1 INJECTION, SOLUTION SUBCUTANEOUS
Qty: 3 ML | Refills: 2 | Status: SHIPPED | OUTPATIENT
Start: 2023-04-06

## 2023-04-07 RX ORDER — CETIRIZINE HYDROCHLORIDE 10 MG/1
TABLET ORAL
Qty: 30 TABLET | Refills: 2 | Status: SHIPPED | OUTPATIENT
Start: 2023-04-07

## 2023-05-31 ENCOUNTER — OFFICE VISIT (OUTPATIENT)
Dept: FAMILY MEDICINE CLINIC | Facility: CLINIC | Age: 63
End: 2023-05-31

## 2023-05-31 VITALS
TEMPERATURE: 97.1 F | HEART RATE: 92 BPM | HEIGHT: 65 IN | DIASTOLIC BLOOD PRESSURE: 70 MMHG | SYSTOLIC BLOOD PRESSURE: 113 MMHG | WEIGHT: 201.4 LBS | OXYGEN SATURATION: 96 % | BODY MASS INDEX: 33.55 KG/M2

## 2023-05-31 DIAGNOSIS — E11.9 TYPE 2 DIABETES MELLITUS WITHOUT COMPLICATION, WITHOUT LONG-TERM CURRENT USE OF INSULIN: Primary | ICD-10-CM

## 2023-05-31 DIAGNOSIS — G47.09 OTHER INSOMNIA: ICD-10-CM

## 2023-05-31 DIAGNOSIS — Z72.0 TOBACCO ABUSE: ICD-10-CM

## 2023-05-31 NOTE — PROGRESS NOTES
Judy Koehler is a 62 y.o. female.     History of Present Illness  62-year-old white female smoker with history of migraine headaches, GERD, partial hysterectomy, type 2 diabetes, hyperlipidemia, insomnia, IBS, rotator cuff surgery who comes in today for follow-up visit    Blood pressure 112/70 heart rate 92 she denies any chest pain, dyspnea, tachycardia or dizziness    Patient is on Ozempic at and sugars have been around   1 mg the 110s 120s in the morning.  We will be getting fasting labs and we will increase her Ozempic to 2 mg and stop the Amaryl.  Patient is to call and fasting labs 7 to 10 days to see if we need to adjust medications.  Her last A1c was 6.3 fasting blood sugar 124 and a normal lipid panel    On last visit patient was placed on hydroxyzine but was only taking 1 tablet so advised her to take 2 and she states it is helping with her insomnia    Weight is 201 with a BMI of 33.5.  She has had 3 COVID vaccines up-to-date on eye exam mammogram DEXA scan and her colonoscopy is due this year and I gave her the green packet to schedule            Fasting blood work  Increase Ozempic to 2 mg weekly and stop Amaryl  Call and fasting blood sugars 7 to 10 days after starting Ozempic  Diet and exercise  Schedule colonoscopy  Follow-up 6 weeks  Diabetes  She has type 2 diabetes mellitus. No MedicAlert identification noted. Pertinent negatives for hypoglycemia include no hunger, mood changes, sleepiness or sweats. Associated symptoms include foot paresthesias. Pertinent negatives for diabetes include no foot ulcerations and no visual change. Pertinent negatives for hypoglycemia complications include no blackouts, no hospitalization, no nocturnal hypoglycemia, no required assistance and no required glucagon injection. Pertinent negatives for diabetic complications include no CVA, heart disease, impotence, nephropathy, peripheral neuropathy, PVD or retinopathy. Risk factors for coronary artery disease include  "family history, hypertension, obesity and tobacco exposure. Current diabetic treatment includes diet and oral agent (triple therapy). She is compliant with treatment most of the time. Rotation sites for injection include the abdominal wall. Her weight is fluctuating minimally. She is following a generally healthy, high fiber and high salt diet. Meal planning includes avoidance of concentrated sweets. She has not had a previous visit with a dietitian. She participates in exercise daily. She monitors blood glucose at home 1-2 x per day. She monitors urine at home <1 x per month. Blood glucose monitoring compliance is fair. Her breakfast blood glucose is taken between 5-6 am. Her breakfast blood glucose range is generally 110-130 mg/dl. Her highest blood glucose is 140-180 mg/dl. Her overall blood glucose range is 130-140 mg/dl. She does not see a podiatrist.Eye exam is not current.        The following portions of the patient's history were reviewed and updated as appropriate: allergies, current medications, past family history, past medical history, past social history, past surgical history and problem list.    Vitals:    23 1101   BP: 113/70   BP Location: Right arm   Patient Position: Sitting   Cuff Size: Large Adult   Pulse: 92   Temp: 97.1 °F (36.2 °C)   TempSrc: Temporal   SpO2: 96%   Weight: 91.4 kg (201 lb 6.4 oz)   Height: 165.1 cm (65\")     Body mass index is 33.51 kg/m².    Past Medical History:   Diagnosis Date   • Allergic    • Arthritis    • Diabetes mellitus    • GERD (gastroesophageal reflux disease)    • Headache    • Histoplasmosis     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   • Hyperlipidemia    • Hypertension    • Irritable bowel syndrome    • Low back pain    • Murmur    • Obesity    • Seasonal allergies      Past Surgical History:   Procedure Laterality Date   •  SECTION     • COLONOSCOPY     • JOINT REPLACEMENT  2023    Left shoulder   • LUNG BIOPSY     • SUBTOTAL HYSTERECTOMY     • TONSILLECTOMY   "   • TOTAL SHOULDER ARTHROPLASTY W/ DISTAL CLAVICLE EXCISION Left 2/15/2023    Procedure: TOTAL SHOULDER REVERSE ARTHROPLASTY;  Surgeon: Willian Gomez MD;  Location: [facility] MAIN OR;  Service: Orthopedics;  Laterality: Left;     Family History   Problem Relation Age of Onset   • Hypertension Mother    • Heart disease Mother    • Arthritis Mother    • Cancer Mother    • Thyroid disease Mother    • Diabetes Father    • Hyperlipidemia Father    • No Known Problems Sister    • No Known Problems Sister    • No Known Problems Sister    • No Known Problems Brother    • No Known Problems Brother    • No Known Problems Brother    • No Known Problems Brother    • No Known Problems Son      Immunization History   Administered Date(s) Administered   • COVID-19 (PFIZER) Purple Cap Monovalent 01/08/2021, 01/29/2021   • Covid-19 (Pfizer) Gray Cap Monovalent 04/06/2022   • FluLaval/Fluzone >6mos 10/11/2017, 09/21/2018, 09/27/2019, 10/02/2020, 10/13/2021, 11/23/2022   • Influenza, Unspecified 09/27/2019, 10/02/2020, 10/13/2021   • Pneumococcal Polysaccharide (PPSV23) 12/12/2017   • Tdap 07/20/2012, 04/19/2013       Admission on 02/15/2023, Discharged on 02/16/2023   Component Date Value Ref Range Status   • Glucose 02/15/2023 113 (H)  70 - 105 mg/dL Final    Serial Number: [id number]:  [id number]   • Glucose 02/15/2023 152 (H)  70 - 105 mg/dL Final    Serial Number: [id number]:  [id number]   • Glucose 02/15/2023 380 (H)  70 - 105 mg/dL Final    Serial Number: [id number]:  [id number]   • Glucose 02/15/2023 370 (H)  70 - 105 mg/dL Final    Serial Number: [id number]:  [id number]   • Glucose 02/15/2023 266 (H)  65 - 99 mg/dL Final   • BUN 02/15/2023 18  8 - 23 mg/dL Final   • Creatinine 02/15/2023 1.17 (H)  0.57 - 1.00 mg/dL Final   • Sodium 02/15/2023 135 (L)  136 - 145 mmol/L Final   • Potassium 02/15/2023 4.4  3.5 - 5.2 mmol/L Final   • Chloride 02/15/2023 101  98 - 107 mmol/L Final   • CO2 02/15/2023  25.0  22.0 - 29.0 mmol/L Final   • Calcium 02/15/2023 8.3 (L)  8.6 - 10.5 mg/dL Final   • BUN/Creatinine Ratio 02/15/2023 15.4  7.0 - 25.0 Final   • Anion Gap 02/15/2023 9.0  5.0 - 15.0 mmol/L Final   • eGFR 02/15/2023 52.9 (L)  >60.0 mL/min/1.73 Final   • Hemoglobin 02/15/2023 13.6  12.0 - 15.9 g/dL Final   • Hematocrit 02/15/2023 40.7  34.0 - 46.6 % Final   • Glucose 02/16/2023 133 (H)  70 - 105 mg/dL Final    Serial Number: [id number]:  [id number]         Review of Systems   Constitutional: Negative.    Respiratory: Negative.    Cardiovascular: Negative.    Gastrointestinal: Negative.    Genitourinary: Negative.  Negative for impotence.   Musculoskeletal: Negative.    Skin: Negative.    Neurological: Negative.        Objective   Physical Exam  Constitutional:       Appearance: Normal appearance.   HENT:      Head: Normocephalic.   Cardiovascular:      Rate and Rhythm: Normal rate and regular rhythm.      Pulses: Normal pulses.      Heart sounds: Normal heart sounds.   Pulmonary:      Effort: Pulmonary effort is normal.      Breath sounds: Normal breath sounds.   Abdominal:      General: Bowel sounds are normal.   Musculoskeletal:         General: Normal range of motion.   Skin:     General: Skin is warm and dry.   Neurological:      General: No focal deficit present.      Mental Status: She is alert and oriented to person, place, and time.   Psychiatric:         Mood and Affect: Mood normal.         Procedures    Assessment & Plan   Diagnoses and all orders for this visit:    1. Type 2 diabetes mellitus without complication, without long-term current use of insulin (Primary)  -     Comprehensive Metabolic Panel; Future  -     Hemoglobin A1c; Future    2. Other insomnia    3. Tobacco abuse    Other orders  -     Semaglutide, 2 MG/DOSE, (OZEMPIC) 8 MG/3ML solution pen-injector; Inject 2 mg under the skin into the appropriate area as directed 1 (One) Time Per Week.  Dispense: 12 mL; Refill: 4          Current  Outpatient Medications:   •  acetaminophen (TYLENOL) 325 MG tablet, Take 2 tablets by mouth Every 6 (Six) Hours As Needed for Mild Pain., Disp: , Rfl:   •  atorvastatin (LIPITOR) 20 MG tablet, Take 1 tablet by mouth Daily., Disp: 90 tablet, Rfl: 1  •  B-D UF III MINI PEN NEEDLES 31G X 5 MM misc, USE AS DIRECTED, Disp: 100 each, Rfl: 6  •  Blood Glucose Monitoring Suppl (Blood Glucose Monitor System) w/Device kit, Check blood sugars tid, Disp: 1 each, Rfl: 0  •  cetirizine (zyrTEC) 10 MG tablet, TAKE 1 TABLET BY MOUTH EVERY DAY, Disp: 30 tablet, Rfl: 2  •  Cyanocobalamin (B-12) 1000 MCG capsule, Take  by mouth., Disp: , Rfl:   •  cyclobenzaprine (FLEXERIL) 10 MG tablet, Take 1 tablet by mouth 3 (Three) Times a Day As Needed for Muscle Spasms., Disp: 30 tablet, Rfl: 0  •  diphenhydrAMINE HCl (BENADRYL ALLERGY PO), Take  by mouth., Disp: , Rfl:   •  glimepiride (AMARYL) 4 MG tablet, Take 1 tablet by mouth Daily. (Patient taking differently: Take 1 tablet by mouth Daily. HOLD DOS), Disp: 90 tablet, Rfl: 1  •  glucose blood test strip, Check blood sugar tid, Disp: 100 each, Rfl: 12  •  hydrOXYzine (ATARAX) 50 MG tablet, 1/2 to 2 tablets as needed for anxiety or insomnia, Disp: 90 tablet, Rfl: 1  •  lisinopril (PRINIVIL,ZESTRIL) 20 MG tablet, TAKE 1 TABLET BY MOUTH EVERY DAY, Disp: 90 tablet, Rfl: 0  •  naloxone (NARCAN) 4 MG/0.1ML nasal spray, 1 spray into the nostril(s) as directed by provider As Needed (per overdose)., Disp: 2 each, Rfl: 2  •  pantoprazole (PROTONIX) 40 MG EC tablet, Take 1 tablet by mouth Daily., Disp: 90 tablet, Rfl: 1  •  promethazine (PHENERGAN) 12.5 MG tablet, 1-2 q6h prn, Disp: 60 tablet, Rfl: 2  •  pseudoephedrine (Sudafed) 30 MG tablet, Take 1 tablet by mouth Every 4 (Four) Hours As Needed for Congestion., Disp: 60 tablet, Rfl: 2  •  traMADol (ULTRAM) 50 MG tablet, Take 1 tablet by mouth Every 6 (Six) Hours As Needed for Moderate Pain. prn, Disp: 120 tablet, Rfl: 1  •  traMADol (ULTRAM) 50 MG  tablet, Take 1 tablet by mouth Every 6 (Six) Hours As Needed for Moderate Pain., Disp: 28 tablet, Rfl: 0  •  Triamcinolone Acetonide (Nasacort Allergy 24HR) 55 MCG/ACT nasal inhaler, 2 sprays into the nostril(s) as directed by provider Daily., Disp: 16.5 g, Rfl: 2  •  vitamin D (ERGOCALCIFEROL) 1.25 MG (07860 UT) capsule capsule, Take 1 capsule by mouth 1 (One) Time Per Week., Disp: 12 capsule, Rfl: 1  •  Semaglutide, 2 MG/DOSE, (OZEMPIC) 8 MG/3ML solution pen-injector, Inject 2 mg under the skin into the appropriate area as directed 1 (One) Time Per Week., Disp: 12 mL, Rfl: 4           Debbie Phillips, YANY 5/31/2023 12:49 EDT  This note has been electronically signed

## 2023-05-31 NOTE — PATIENT INSTRUCTIONS
Fasting blood work  Increase Ozempic to 2 mg weekly and stop Amaryl  Call and fasting blood sugars 7 to 10 days after starting Ozempic  Diet and exercise  Schedule colonoscopy  Follow-up 6 weeks

## 2023-06-05 RX ORDER — LISINOPRIL 20 MG/1
TABLET ORAL
Qty: 90 TABLET | Refills: 0 | Status: SHIPPED | OUTPATIENT
Start: 2023-06-05

## 2023-07-18 ENCOUNTER — OFFICE (OUTPATIENT)
Dept: URBAN - NONMETROPOLITAN AREA CLINIC 10 | Facility: CLINIC | Age: 63
End: 2023-07-18
Payer: COMMERCIAL

## 2023-07-18 VITALS
HEIGHT: 66 IN | DIASTOLIC BLOOD PRESSURE: 76 MMHG | HEART RATE: 79 BPM | SYSTOLIC BLOOD PRESSURE: 126 MMHG | WEIGHT: 197 LBS

## 2023-07-18 DIAGNOSIS — R07.81 PLEURODYNIA: ICD-10-CM

## 2023-07-18 DIAGNOSIS — R10.12 LEFT UPPER QUADRANT PAIN: ICD-10-CM

## 2023-07-18 DIAGNOSIS — R19.4 CHANGE IN BOWEL HABIT: ICD-10-CM

## 2023-07-18 PROCEDURE — 99203 OFFICE O/P NEW LOW 30 MIN: CPT | Performed by: NURSE PRACTITIONER

## 2023-07-19 ENCOUNTER — OFFICE (OUTPATIENT)
Dept: URBAN - METROPOLITAN AREA PATHOLOGY 4 | Facility: PATHOLOGY | Age: 63
End: 2023-07-19
Payer: COMMERCIAL

## 2023-07-19 ENCOUNTER — ON CAMPUS - OUTPATIENT (OUTPATIENT)
Dept: URBAN - METROPOLITAN AREA HOSPITAL 2 | Facility: HOSPITAL | Age: 63
End: 2023-07-19
Payer: COMMERCIAL

## 2023-07-19 VITALS
OXYGEN SATURATION: 98 % | DIASTOLIC BLOOD PRESSURE: 64 MMHG | DIASTOLIC BLOOD PRESSURE: 66 MMHG | DIASTOLIC BLOOD PRESSURE: 76 MMHG | HEIGHT: 66 IN | OXYGEN SATURATION: 100 % | SYSTOLIC BLOOD PRESSURE: 120 MMHG | TEMPERATURE: 98 F | OXYGEN SATURATION: 99 % | HEART RATE: 91 BPM | RESPIRATION RATE: 16 BRPM | HEART RATE: 90 BPM | SYSTOLIC BLOOD PRESSURE: 103 MMHG | SYSTOLIC BLOOD PRESSURE: 106 MMHG | HEART RATE: 89 BPM | DIASTOLIC BLOOD PRESSURE: 84 MMHG | DIASTOLIC BLOOD PRESSURE: 95 MMHG | HEART RATE: 88 BPM | OXYGEN SATURATION: 97 % | SYSTOLIC BLOOD PRESSURE: 112 MMHG | DIASTOLIC BLOOD PRESSURE: 70 MMHG | DIASTOLIC BLOOD PRESSURE: 65 MMHG | WEIGHT: 195 LBS | HEART RATE: 92 BPM | SYSTOLIC BLOOD PRESSURE: 126 MMHG | SYSTOLIC BLOOD PRESSURE: 110 MMHG | SYSTOLIC BLOOD PRESSURE: 104 MMHG

## 2023-07-19 DIAGNOSIS — K57.30 DIVERTICULOSIS OF LARGE INTESTINE WITHOUT PERFORATION OR ABS: ICD-10-CM

## 2023-07-19 DIAGNOSIS — Z86.010 PERSONAL HISTORY OF COLONIC POLYPS: ICD-10-CM

## 2023-07-19 DIAGNOSIS — K31.9 DISEASE OF STOMACH AND DUODENUM, UNSPECIFIED: ICD-10-CM

## 2023-07-19 DIAGNOSIS — D12.3 BENIGN NEOPLASM OF TRANSVERSE COLON: ICD-10-CM

## 2023-07-19 DIAGNOSIS — D12.2 BENIGN NEOPLASM OF ASCENDING COLON: ICD-10-CM

## 2023-07-19 DIAGNOSIS — K31.89 OTHER DISEASES OF STOMACH AND DUODENUM: ICD-10-CM

## 2023-07-19 DIAGNOSIS — R10.13 EPIGASTRIC PAIN: ICD-10-CM

## 2023-07-19 PROBLEM — K63.5 POLYP OF COLON: Status: ACTIVE | Noted: 2023-07-19

## 2023-07-19 LAB
GI HISTOLOGY: A. SELECT: (no result)
GI HISTOLOGY: B. UNSPECIFIED: (no result)
GI HISTOLOGY: C. UNSPECIFIED: (no result)
GI HISTOLOGY: PDF REPORT: (no result)

## 2023-07-19 PROCEDURE — 45385 COLONOSCOPY W/LESION REMOVAL: CPT | Mod: 33 | Performed by: INTERNAL MEDICINE

## 2023-07-19 PROCEDURE — 88305 TISSUE EXAM BY PATHOLOGIST: CPT | Performed by: INTERNAL MEDICINE

## 2023-07-19 PROCEDURE — 43239 EGD BIOPSY SINGLE/MULTIPLE: CPT | Performed by: INTERNAL MEDICINE

## 2023-07-19 RX ADMIN — ONDANSETRON HYDROCHLORIDE 4 MG: 4 SOLUTION ORAL at 14:15

## 2023-07-27 ENCOUNTER — OFFICE VISIT (OUTPATIENT)
Dept: ORTHOPEDIC SURGERY | Facility: CLINIC | Age: 63
End: 2023-07-27
Payer: MEDICAID

## 2023-07-27 VITALS — WEIGHT: 194.2 LBS | HEART RATE: 93 BPM | HEIGHT: 65 IN | BODY MASS INDEX: 32.36 KG/M2

## 2023-07-27 DIAGNOSIS — Z47.89 ORTHOPEDIC AFTERCARE: Primary | ICD-10-CM

## 2023-07-27 DIAGNOSIS — M19.012 OSTEOARTHRITIS OF LEFT GLENOHUMERAL JOINT: ICD-10-CM

## 2023-07-27 PROCEDURE — 99213 OFFICE O/P EST LOW 20 MIN: CPT | Performed by: ORTHOPAEDIC SURGERY

## 2023-07-27 NOTE — PROGRESS NOTES
"     Patient ID: Judy Koehler is a 63 y.o. female.  2/15/23 left reverse total shoulder with subscapularis repair  Pain controlled    Review of Systems:        Objective:    Pulse 93   Ht 165.1 cm (65\")   Wt 88.1 kg (194 lb 3.2 oz)   BMI 32.32 kg/m²     Physical Examination:  Incision healed no bony tenderness active elevation 170 abduction 150 external rotation 40 internal rotation L5       Imaging:   left Shoulder X-Ray  Indication: Postop total shoulder  AP Y and Lateral views  Findings: Reverse total shoulder in position  no bony lesion  Soft tissues normal  not examined joint spaces  Hardware appropriately positioned yes      yes prior studies available for comparison    Assessment:    Doing well after shoulder arthroplasty    Plan:   Activity as tolerated x-ray in 6 months      Procedures          Disclaimer: Part of this note may be an electronic transcription/translation of spoken language to printed text using the Dragon Dictation System  "

## 2023-08-02 ENCOUNTER — OFFICE VISIT (OUTPATIENT)
Dept: FAMILY MEDICINE CLINIC | Facility: CLINIC | Age: 63
End: 2023-08-02
Payer: MEDICAID

## 2023-08-02 VITALS
OXYGEN SATURATION: 98 % | WEIGHT: 195.8 LBS | HEART RATE: 68 BPM | TEMPERATURE: 97.4 F | SYSTOLIC BLOOD PRESSURE: 118 MMHG | HEIGHT: 65 IN | DIASTOLIC BLOOD PRESSURE: 60 MMHG | BODY MASS INDEX: 32.62 KG/M2

## 2023-08-02 DIAGNOSIS — Z72.0 TOBACCO ABUSE: ICD-10-CM

## 2023-08-02 DIAGNOSIS — R10.12 LEFT UPPER QUADRANT ABDOMINAL PAIN: ICD-10-CM

## 2023-08-02 DIAGNOSIS — Z47.89 ORTHOPEDIC AFTERCARE: ICD-10-CM

## 2023-08-02 DIAGNOSIS — E11.9 TYPE 2 DIABETES MELLITUS WITHOUT COMPLICATION, WITHOUT LONG-TERM CURRENT USE OF INSULIN: Primary | ICD-10-CM

## 2023-08-02 RX ORDER — GLIMEPIRIDE 1 MG/1
1 TABLET ORAL
Qty: 30 TABLET | Refills: 2 | Status: SHIPPED | OUTPATIENT
Start: 2023-08-02

## 2023-08-02 RX ORDER — TRAMADOL HYDROCHLORIDE 50 MG/1
50 TABLET ORAL EVERY 6 HOURS PRN
Qty: 240 TABLET | Refills: 0 | Status: SHIPPED | OUTPATIENT
Start: 2023-08-02

## 2023-08-02 RX ORDER — PROMETHAZINE HYDROCHLORIDE 12.5 MG/1
TABLET ORAL
Qty: 60 TABLET | Refills: 2 | Status: CANCELLED | OUTPATIENT
Start: 2023-08-02

## 2023-08-02 RX ORDER — ONDANSETRON 4 MG/1
4 TABLET, FILM COATED ORAL EVERY 8 HOURS PRN
Qty: 60 TABLET | Refills: 1 | Status: SHIPPED | OUTPATIENT
Start: 2023-08-02

## 2023-08-02 RX ORDER — CYCLOBENZAPRINE HCL 10 MG
10 TABLET ORAL 3 TIMES DAILY PRN
Qty: 90 TABLET | Refills: 1 | Status: SHIPPED | OUTPATIENT
Start: 2023-08-02

## 2023-08-10 ENCOUNTER — OFFICE (OUTPATIENT)
Dept: URBAN - METROPOLITAN AREA CLINIC 64 | Facility: CLINIC | Age: 63
End: 2023-08-10
Payer: COMMERCIAL

## 2023-08-10 VITALS
SYSTOLIC BLOOD PRESSURE: 124 MMHG | HEART RATE: 94 BPM | HEIGHT: 66 IN | DIASTOLIC BLOOD PRESSURE: 78 MMHG | WEIGHT: 196 LBS

## 2023-08-10 DIAGNOSIS — R19.4 CHANGE IN BOWEL HABIT: ICD-10-CM

## 2023-08-10 DIAGNOSIS — R10.12 LEFT UPPER QUADRANT PAIN: ICD-10-CM

## 2023-08-10 PROCEDURE — 99213 OFFICE O/P EST LOW 20 MIN: CPT | Performed by: NURSE PRACTITIONER

## 2023-08-10 RX ORDER — CETIRIZINE HYDROCHLORIDE 10 MG/1
TABLET ORAL
Qty: 30 TABLET | Refills: 2 | Status: SHIPPED | OUTPATIENT
Start: 2023-08-10

## 2023-08-10 RX ORDER — DICYCLOMINE HYDROCHLORIDE 20 MG/1
TABLET ORAL
Qty: 90 | Refills: 4 | Status: COMPLETED
End: 2024-05-09

## 2023-08-18 ENCOUNTER — TRANSCRIBE ORDERS (OUTPATIENT)
Dept: ADMINISTRATIVE | Facility: HOSPITAL | Age: 63
End: 2023-08-18
Payer: MEDICAID

## 2023-08-18 DIAGNOSIS — R19.8 ALTERED BOWEL FUNCTION: ICD-10-CM

## 2023-08-18 DIAGNOSIS — R10.12 ABDOMINAL PAIN, LEFT UPPER QUADRANT: Primary | ICD-10-CM

## 2023-08-26 RX ORDER — HYDROXYZINE 50 MG/1
TABLET, FILM COATED ORAL
Qty: 60 TABLET | Refills: 2 | Status: SHIPPED | OUTPATIENT
Start: 2023-08-26

## 2023-08-28 ENCOUNTER — OFFICE VISIT (OUTPATIENT)
Dept: ORTHOPEDIC SURGERY | Facility: CLINIC | Age: 63
End: 2023-08-28
Payer: MEDICAID

## 2023-08-28 VITALS — WEIGHT: 195.8 LBS | HEIGHT: 65 IN | HEART RATE: 96 BPM | BODY MASS INDEX: 32.62 KG/M2

## 2023-08-28 DIAGNOSIS — M25.561 ACUTE PAIN OF BOTH KNEES: Primary | ICD-10-CM

## 2023-08-28 DIAGNOSIS — M25.562 ACUTE PAIN OF BOTH KNEES: Primary | ICD-10-CM

## 2023-08-28 DIAGNOSIS — M17.0 BILATERAL PRIMARY OSTEOARTHRITIS OF KNEE: ICD-10-CM

## 2023-08-28 PROCEDURE — 99213 OFFICE O/P EST LOW 20 MIN: CPT | Performed by: ORTHOPAEDIC SURGERY

## 2023-08-28 RX ORDER — DICYCLOMINE HCL 20 MG
1 TABLET ORAL 3 TIMES DAILY
COMMUNITY
Start: 2023-08-10

## 2023-08-28 NOTE — PROGRESS NOTES
Patient ID: Judy Koehler is a 63 y.o. female.    Chief Complaint:    Chief Complaint   Patient presents with    Left Knee - Initial Evaluation, Pain     Pain 5    Right Knee - Initial Evaluation, Pain     Pain 5        HPI:  This is a 63-year-old female with longstanding chronic bilateral knee pain.  She had viscosupplementation and did well with that about a year and a half ago but pain is starting to return to each hurt the more she is on it especially at the end of the day they swell occasionally no significant mechanical complaint  Past Medical History:   Diagnosis Date    Allergic     Arthritis     Diabetes mellitus     GERD (gastroesophageal reflux disease)     Headache     Histoplasmosis     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    Hyperlipidemia     Hypertension     Irritable bowel syndrome     Low back pain     Murmur     Obesity     Seasonal allergies        Past Surgical History:   Procedure Laterality Date     SECTION      COLONOSCOPY      JOINT REPLACEMENT  2023    Left shoulder    LUNG BIOPSY      SUBTOTAL HYSTERECTOMY      TONSILLECTOMY      TOTAL SHOULDER ARTHROPLASTY W/ DISTAL CLAVICLE EXCISION Left 2/15/2023    Procedure: TOTAL SHOULDER REVERSE ARTHROPLASTY;  Surgeon: Willian Gomez MD;  Location: [facility] MAIN OR;  Service: Orthopedics;  Laterality: Left;       Family History   Problem Relation Age of Onset    Hypertension Mother     Heart disease Mother     Arthritis Mother     Cancer Mother     Thyroid disease Mother     Diabetes Father     Hyperlipidemia Father     No Known Problems Sister     No Known Problems Sister     No Known Problems Sister     No Known Problems Brother     No Known Problems Brother     No Known Problems Brother     No Known Problems Brother     No Known Problems Son           Social History     Occupational History    Not on file   Tobacco Use    Smoking status: Every Day     Packs/day: 1.00     Years: 2.00     Pack years: 2.00     Types: Cigarettes    Smokeless  "tobacco: Never   Vaping Use    Vaping Use: Never used   Substance and Sexual Activity    Alcohol use: Never    Drug use: Never    Sexual activity: Yes     Partners: Male     Birth control/protection: None      Review of Systems   Cardiovascular:  Negative for chest pain.   Musculoskeletal:  Positive for arthralgias.     Objective:    Pulse 96   Ht 165.1 cm (65\")   Wt 88.8 kg (195 lb 12.8 oz)   BMI 32.58 kg/mý     Physical Examination:  Both knees demonstrate no previous incisions mild effusion bilateral moderate medial joint line tenderness bilateral knee range of motion 0 to 120 degrees bilateral  Sensory and motor exam are intact in all distributions. Dorsalis pedis and posterior tibialis pulses are palpable and capillary refill is less than two seconds to all digits.    Imaging:  bilateral Knee X-Ray  Indication: Bilateral knee pain  AP, Lateral views, [place]  Findings: Moderate tricompartmental degenerative joint disease with calcification of the posterior aspect of the distal femur on the right possible loose body versus arterial calcification versus calcified Baker's cyst  no bony lesion  Soft tissues abnormal  decreased joint spaces  Hardware appropriately positioned not applicable      no prior studies available for comparison.    Assessment:  Bilateral knee degenerative joint disease    Plan:  Treatment options discussed she would like to proceed with repeat viscosupplementation      Procedures         Disclaimer: Part of this note may be an electronic transcription/translation of spoken language to printed text using the Dragon Dictation System  "

## 2023-08-30 RX ORDER — LISINOPRIL 20 MG/1
TABLET ORAL
Qty: 90 TABLET | Refills: 0 | Status: SHIPPED | OUTPATIENT
Start: 2023-08-30

## 2023-09-24 RX ORDER — ATORVASTATIN CALCIUM 20 MG/1
TABLET, FILM COATED ORAL
Qty: 90 TABLET | Refills: 1 | Status: SHIPPED | OUTPATIENT
Start: 2023-09-24

## 2023-09-26 ENCOUNTER — PREP FOR SURGERY (OUTPATIENT)
Dept: OTHER | Facility: HOSPITAL | Age: 63
End: 2023-09-26
Payer: MEDICAID

## 2023-09-26 ENCOUNTER — OFFICE VISIT (OUTPATIENT)
Dept: SURGERY | Facility: CLINIC | Age: 63
End: 2023-09-26
Payer: MEDICAID

## 2023-09-26 VITALS
SYSTOLIC BLOOD PRESSURE: 128 MMHG | BODY MASS INDEX: 32.99 KG/M2 | TEMPERATURE: 97.7 F | WEIGHT: 198 LBS | DIASTOLIC BLOOD PRESSURE: 82 MMHG | HEIGHT: 65 IN | OXYGEN SATURATION: 98 % | HEART RATE: 64 BPM

## 2023-09-26 DIAGNOSIS — K80.20 SYMPTOMATIC CHOLELITHIASIS: Primary | ICD-10-CM

## 2023-09-26 DIAGNOSIS — K21.00 GASTROESOPHAGEAL REFLUX DISEASE WITH ESOPHAGITIS, UNSPECIFIED WHETHER HEMORRHAGE: Primary | ICD-10-CM

## 2023-09-26 PROCEDURE — 1160F RVW MEDS BY RX/DR IN RCRD: CPT | Performed by: STUDENT IN AN ORGANIZED HEALTH CARE EDUCATION/TRAINING PROGRAM

## 2023-09-26 PROCEDURE — 3074F SYST BP LT 130 MM HG: CPT | Performed by: STUDENT IN AN ORGANIZED HEALTH CARE EDUCATION/TRAINING PROGRAM

## 2023-09-26 PROCEDURE — 99204 OFFICE O/P NEW MOD 45 MIN: CPT | Performed by: STUDENT IN AN ORGANIZED HEALTH CARE EDUCATION/TRAINING PROGRAM

## 2023-09-26 PROCEDURE — 1159F MED LIST DOCD IN RCRD: CPT | Performed by: STUDENT IN AN ORGANIZED HEALTH CARE EDUCATION/TRAINING PROGRAM

## 2023-09-26 PROCEDURE — 3079F DIAST BP 80-89 MM HG: CPT | Performed by: STUDENT IN AN ORGANIZED HEALTH CARE EDUCATION/TRAINING PROGRAM

## 2023-09-26 RX ORDER — INDOCYANINE GREEN AND WATER 25 MG
5 KIT INJECTION ONCE
OUTPATIENT
Start: 2023-09-26 | End: 2023-09-26

## 2023-09-28 PROBLEM — K80.20 SYMPTOMATIC CHOLELITHIASIS: Status: ACTIVE | Noted: 2023-09-28

## 2023-09-29 ENCOUNTER — OFFICE VISIT (OUTPATIENT)
Dept: FAMILY MEDICINE CLINIC | Facility: CLINIC | Age: 63
End: 2023-09-29
Payer: MEDICAID

## 2023-09-29 VITALS
SYSTOLIC BLOOD PRESSURE: 114 MMHG | HEART RATE: 97 BPM | WEIGHT: 195 LBS | HEIGHT: 65 IN | TEMPERATURE: 97.5 F | BODY MASS INDEX: 32.49 KG/M2 | OXYGEN SATURATION: 98 % | DIASTOLIC BLOOD PRESSURE: 72 MMHG

## 2023-09-29 DIAGNOSIS — R30.0 DYSURIA: Primary | ICD-10-CM

## 2023-09-29 LAB
BILIRUB BLD-MCNC: NEGATIVE MG/DL
CLARITY, POC: CLEAR
COLOR UR: YELLOW
EXPIRATION DATE: NORMAL
GLUCOSE UR STRIP-MCNC: NEGATIVE MG/DL
KETONES UR QL: NEGATIVE
LEUKOCYTE EST, POC: NEGATIVE
Lab: NORMAL
NITRITE UR-MCNC: NEGATIVE MG/ML
PH UR: 5.5 [PH] (ref 5–8)
PROT UR STRIP-MCNC: NEGATIVE MG/DL
RBC # UR STRIP: NEGATIVE /UL
SP GR UR: 1.01 (ref 1–1.03)
UROBILINOGEN UR QL: NORMAL

## 2023-09-29 RX ORDER — CIPROFLOXACIN 500 MG/1
500 TABLET, FILM COATED ORAL 2 TIMES DAILY
Qty: 10 TABLET | Refills: 0 | Status: SHIPPED | OUTPATIENT
Start: 2023-09-29

## 2023-09-29 NOTE — PROGRESS NOTES
"Chief Complaint  burning during urination     Subjective          Judy oKehler presents to [facility] INTERNAL [facility]    Objective     63-year-old female patient of Debbie Phillips presents today with dysuria.    Three days with dysuria, urgency, and decreased output. Pelvic pressure and backache.  Denies fevers nausea vomiting diarrhea flank pain.    She does report she is having cholecystectomy next month.      Vital Signs:     /72 (BP Location: Right arm, Patient Position: Sitting, Cuff Size: Adult)   Pulse 97   Temp 97.5 °F (36.4 °C) (Infrared)   Ht 165.1 cm (65\")   Wt 88.5 kg (195 lb)   SpO2 98%   BMI 32.45 kg/m²         History of Present Illness      Patient Active Problem List   Diagnosis    Gastroesophageal reflux disease    Hypertension    Migraine    Mixed hyperlipidemia    Plantar fasciitis of left foot    Rupture of hamstring tendon    Type 2 diabetes mellitus without complication, without long-term current use of insulin    Traumatic complete tear of left rotator cuff    Labral tear of long head of left biceps tendon    Tobacco abuse    Other insomnia    Irritable bowel syndrome with both constipation and diarrhea    DJD of left shoulder    S/P reverse total shoulder arthroplasty, left    Drug-induced constipation    Flank strain, initial encounter    Left upper quadrant abdominal pain    Symptomatic cholelithiasis         Past Medical History:   Diagnosis Date    Allergic     Arthritis     Cholelithiasis Sept 2023    Colon polyp 5years    Diabetes mellitus     GERD (gastroesophageal reflux disease)     Headache     Histoplasmosis     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    Hyperlipidemia     Hypertension     Irritable bowel syndrome     Low back pain     Murmur     Obesity     Seasonal allergies           Family History   Problem Relation Age of Onset    Hypertension Mother     Heart disease Mother     Arthritis Mother     Cancer Mother     Thyroid disease Mother     Diabetes Father     " Hyperlipidemia Father     No Known Problems Sister     No Known Problems Sister     No Known Problems Sister     No Known Problems Brother     No Known Problems Brother     No Known Problems Brother     No Known Problems Brother     No Known Problems Son           Past Surgical History:   Procedure Laterality Date     SECTION      COLONOSCOPY      JOINT REPLACEMENT  2023    Left shoulder    LUNG BIOPSY      SUBTOTAL HYSTERECTOMY      TONSILLECTOMY      TOTAL SHOULDER ARTHROPLASTY W/ DISTAL CLAVICLE EXCISION Left 2/15/2023    Procedure: TOTAL SHOULDER REVERSE ARTHROPLASTY;  Surgeon: Willian Gomez MD;  Location: [facility] MAIN OR;  Service: Orthopedics;  Laterality: Left;          Social History     Socioeconomic History    Marital status:    Tobacco Use    Smoking status: Every Day     Packs/day: 1.00     Years: 5.00     Pack years: 5.00     Types: Cigarettes     Passive exposure: Current    Smokeless tobacco: Never   Vaping Use    Vaping Use: Never used   Substance and Sexual Activity    Alcohol use: Never    Drug use: Never    Sexual activity: Yes     Partners: Male     Birth control/protection: None            Physical Exam  Vitals reviewed.   Constitutional:       Appearance: She is well-developed.      Comments:      HENT:      Head: Normocephalic and atraumatic.      Nose: Nose normal.      Mouth/Throat:      Mouth: Mucous membranes are moist.      Pharynx: Oropharynx is clear.   Eyes:      Conjunctiva/sclera: Conjunctivae normal.      Pupils: Pupils are equal, round, and reactive to light.   Cardiovascular:      Rate and Rhythm: Normal rate.   Pulmonary:      Effort: Pulmonary effort is normal. No respiratory distress.   Abdominal:      General: Bowel sounds are normal. There is no distension.      Palpations: Abdomen is soft. There is no mass.      Tenderness: There is abdominal tenderness in the suprapubic area. There is no guarding or rebound.      Hernia: No hernia is present.    Musculoskeletal:         General: Normal range of motion.      Cervical back: Normal range of motion.   Skin:     General: Skin is warm and dry.      Findings: No rash.   Neurological:      Mental Status: She is alert and oriented to person, place, and time.   Psychiatric:         Behavior: Behavior normal.              Result Review :                                   Assessment and Plan      Diagnoses and all orders for this visit:    1. Dysuria (Primary)  -     POC Urinalysis Dipstick, Automated  -     Urine Culture - Urine, Urine, Clean Catch  -     ciprofloxacin (Cipro) 500 MG tablet; Take 1 tablet by mouth 2 (Two) Times a Day.  Dispense: 10 tablet; Refill: 0      Will send for culture and place on Cipro atbx prophylactially due to symptoms. Will notify when results return.  Encouraged increased water intake        Follow Up       No follow-ups on file.      Patient was given instructions and counseling regarding her condition or for health maintenance advice. Please see specific information pulled into the AVS if appropriate.     Brenda Mota, [id number]/29/202311:20 EDT  This note has been electronically signed

## 2023-10-02 RX ORDER — CYCLOBENZAPRINE HCL 10 MG
TABLET ORAL
Qty: 90 TABLET | Refills: 1 | Status: SHIPPED | OUTPATIENT
Start: 2023-10-02

## 2023-10-06 LAB
BACTERIA UR CULT: ABNORMAL
BACTERIA UR CULT: ABNORMAL
OTHER ANTIBIOTIC SUSC ISLT: ABNORMAL

## 2023-10-10 ENCOUNTER — TELEPHONE (OUTPATIENT)
Dept: FAMILY MEDICINE CLINIC | Facility: CLINIC | Age: 63
End: 2023-10-10
Payer: MEDICAID

## 2023-10-10 NOTE — TELEPHONE ENCOUNTER
Sent mychart msg to pt.    RELAY MESSAGE    From Brenda:    Please notify patient she is on the correct antibiotic for the UTI.  If continues with issues follow-up with Debbie

## 2023-10-12 ENCOUNTER — OFFICE VISIT (OUTPATIENT)
Dept: ORTHOPEDIC SURGERY | Facility: CLINIC | Age: 63
End: 2023-10-12
Payer: MEDICAID

## 2023-10-12 VITALS — WEIGHT: 195 LBS | HEART RATE: 98 BPM | HEIGHT: 65 IN | BODY MASS INDEX: 32.49 KG/M2

## 2023-10-12 DIAGNOSIS — M17.0 BILATERAL PRIMARY OSTEOARTHRITIS OF KNEE: Primary | ICD-10-CM

## 2023-10-12 NOTE — PROGRESS NOTES
"     Patient ID: Judy Koehler is a 63 y.o. female.  Bilateral knee pain here for [place]    Review of Systems:        Objective:    Pulse 98   Ht 165.1 cm (65\")   Wt 88.5 kg (195 lb)   BMI 32.45 kg/mý     Physical Examination:     Both knees demonstrate mild effusions no redness    Imaging:       Assessment:    Bilateral knee degenerative joint disease    Plan:         Large Joint Arthrocentesis: R knee  Date/Time: 10/12/2023 9:01 AM  Consent given by: patient  Timeout: Immediately prior to procedure a time out was called to verify the correct patient, procedure, equipment, support staff and site/side marked as required   Supporting Documentation  Indications: pain   Procedure Details  Location: knee - R knee  Preparation: Patient was prepped and draped in the usual sterile fashion  Needle size: 22 G  Medications administered: 88 mg Hyaluronan 88 MG/4ML  Patient tolerance: patient tolerated the procedure well with no immediate complications      Large Joint Arthrocentesis: L knee  Date/Time: 10/12/2023 9:01 AM  Consent given by: patient  Timeout: Immediately prior to procedure a time out was called to verify the correct patient, procedure, equipment, support staff and site/side marked as required   Supporting Documentation  Indications: pain   Procedure Details  Location: knee - L knee  Preparation: Patient was prepped and draped in the usual sterile fashion  Needle size: 22 G  Medications administered: 88 mg Hyaluronan 88 MG/4ML  Patient tolerance: patient tolerated the procedure well with no immediate complications            Disclaimer: Part of this note may be an electronic transcription/translation of spoken language to printed text using the Dragon Dictation System    "

## 2023-10-16 ENCOUNTER — OFFICE (OUTPATIENT)
Dept: URBAN - METROPOLITAN AREA CLINIC 64 | Facility: CLINIC | Age: 63
End: 2023-10-16
Payer: COMMERCIAL

## 2023-10-16 ENCOUNTER — HOSPITAL ENCOUNTER (OUTPATIENT)
Dept: CARDIOLOGY | Facility: HOSPITAL | Age: 63
Discharge: HOME | End: 2023-10-16
Payer: MEDICAID

## 2023-10-16 ENCOUNTER — LAB (OUTPATIENT)
Dept: LAB | Facility: HOSPITAL | Age: 63
End: 2023-10-16
Payer: MEDICAID

## 2023-10-16 VITALS
SYSTOLIC BLOOD PRESSURE: 126 MMHG | WEIGHT: 200 LBS | HEIGHT: 66 IN | HEART RATE: 91 BPM | DIASTOLIC BLOOD PRESSURE: 78 MMHG

## 2023-10-16 DIAGNOSIS — K80.20 CALCULUS OF GALLBLADDER WITHOUT CHOLECYSTITIS WITHOUT OBSTRU: ICD-10-CM

## 2023-10-16 DIAGNOSIS — K59.00 CONSTIPATION, UNSPECIFIED: ICD-10-CM

## 2023-10-16 DIAGNOSIS — R10.12 LEFT UPPER QUADRANT PAIN: ICD-10-CM

## 2023-10-16 LAB
ANION GAP SERPL CALCULATED.3IONS-SCNC: 10 MMOL/L (ref 5–15)
BASOPHILS # BLD AUTO: 0.06 10*3/MM3 (ref 0–0.2)
BASOPHILS NFR BLD AUTO: 0.7 % (ref 0–1.5)
BUN SERPL-MCNC: 12 MG/DL (ref 8–23)
BUN/CREAT SERPL: 13.2 (ref 7–25)
CALCIUM SPEC-SCNC: 9 MG/DL (ref 8.6–10.5)
CHLORIDE SERPL-SCNC: 102 MMOL/L (ref 98–107)
CO2 SERPL-SCNC: 26 MMOL/L (ref 22–29)
CREAT SERPL-MCNC: 0.91 MG/DL (ref 0.57–1)
DEPRECATED RDW RBC AUTO: 37.9 FL (ref 37–54)
EGFRCR SERPLBLD CKD-EPI 2021: 71 ML/MIN/1.73
EOSINOPHIL # BLD AUTO: 0.13 10*3/MM3 (ref 0–0.4)
EOSINOPHIL NFR BLD AUTO: 1.5 % (ref 0.3–6.2)
ERYTHROCYTE [DISTWIDTH] IN BLOOD BY AUTOMATED COUNT: 12.4 % (ref 12.3–15.4)
GLUCOSE SERPL-MCNC: 179 MG/DL (ref 65–99)
HCT VFR BLD AUTO: 45 % (ref 34–46.6)
HGB BLD-MCNC: 15.3 G/DL (ref 12–15.9)
IMM GRANULOCYTES # BLD AUTO: 0.02 10*3/MM3 (ref 0–0.05)
IMM GRANULOCYTES NFR BLD AUTO: 0.2 % (ref 0–0.5)
LYMPHOCYTES # BLD AUTO: 3.69 10*3/MM3 (ref 0.7–3.1)
LYMPHOCYTES NFR BLD AUTO: 42.8 % (ref 19.6–45.3)
MCH RBC QN AUTO: 29 PG (ref 26.6–33)
MCHC RBC AUTO-ENTMCNC: 34 G/DL (ref 31.5–35.7)
MCV RBC AUTO: 85.4 FL (ref 79–97)
MONOCYTES # BLD AUTO: 0.43 10*3/MM3 (ref 0.1–0.9)
MONOCYTES NFR BLD AUTO: 5 % (ref 5–12)
NEUTROPHILS NFR BLD AUTO: 4.3 10*3/MM3 (ref 1.7–7)
NEUTROPHILS NFR BLD AUTO: 49.8 % (ref 42.7–76)
NRBC BLD AUTO-RTO: 0 /100 WBC (ref 0–0.2)
PLATELET # BLD AUTO: 313 10*3/MM3 (ref 140–450)
PMV BLD AUTO: 10.4 FL (ref 6–12)
POTASSIUM SERPL-SCNC: 4.1 MMOL/L (ref 3.5–5.2)
RBC # BLD AUTO: 5.27 10*6/MM3 (ref 3.77–5.28)
SODIUM SERPL-SCNC: 138 MMOL/L (ref 136–145)
WBC NRBC COR # BLD: 8.63 10*3/MM3 (ref 3.4–10.8)

## 2023-10-16 PROCEDURE — 80048 BASIC METABOLIC PNL TOTAL CA: CPT | Performed by: STUDENT IN AN ORGANIZED HEALTH CARE EDUCATION/TRAINING PROGRAM

## 2023-10-16 PROCEDURE — 93010 ELECTROCARDIOGRAM REPORT: CPT | Performed by: INTERNAL MEDICINE

## 2023-10-16 PROCEDURE — 85025 COMPLETE CBC W/AUTO DIFF WBC: CPT | Performed by: STUDENT IN AN ORGANIZED HEALTH CARE EDUCATION/TRAINING PROGRAM

## 2023-10-16 PROCEDURE — 36415 COLL VENOUS BLD VENIPUNCTURE: CPT | Performed by: STUDENT IN AN ORGANIZED HEALTH CARE EDUCATION/TRAINING PROGRAM

## 2023-10-16 PROCEDURE — 93005 ELECTROCARDIOGRAM TRACING: CPT

## 2023-10-16 PROCEDURE — 99213 OFFICE O/P EST LOW 20 MIN: CPT | Performed by: NURSE PRACTITIONER

## 2023-10-18 NOTE — PROGRESS NOTES
"Chief Complaint  Abdominal Pain and New Patient (Gallstones, Ref by JACK Kellogg, APRN)    Subjective        Judy Koehler presents to [facility] GENERAL SURGERY  History of Present Illness    63-year-old lady here to see me for epigastric and left upper quadrant abdominal pain and nausea.  These symptoms been present for months but are getting worse.  Pain worse after eating she has some nausea and bloating as well.  Had CT of her abdomen pelvis with gallstones.  On her CT she also has a large volume of food within her stomach.    Objective   Vital Signs:  /82   Pulse 64   Temp 97.7 °F (36.5 °C) (Infrared)   Ht 165.1 cm (65\")   Wt 89.8 kg (198 lb)   SpO2 98%   BMI 32.95 kg/m²   Estimated body mass index is 32.95 kg/m² as calculated from the following:    Height as of this encounter: 165.1 cm (65\").    Weight as of this encounter: 89.8 kg (198 lb).               Physical Exam  Constitutional:       General: She is not in acute distress.     Appearance: Normal appearance. She is not ill-appearing.   HENT:      Head: Normocephalic and atraumatic.      Right Ear: External ear normal.      Left Ear: External ear normal.   Eyes:      Extraocular Movements: Extraocular movements intact.      Conjunctiva/sclera: Conjunctivae normal.   Cardiovascular:      Rate and Rhythm: Normal rate and regular rhythm.   Pulmonary:      Effort: Pulmonary effort is normal. No respiratory distress.   Abdominal:      General: There is no distension.      Palpations: Abdomen is soft.      Tenderness: There is no abdominal tenderness.   Musculoskeletal:         General: No swelling or deformity.   Skin:     General: Skin is warm and dry.   Neurological:      Mental Status: She is alert and oriented to person, place, and time. Mental status is at baseline.        Result Review :                   Assessment and Plan   Diagnoses and all orders for this visit:    1. Symptomatic cholelithiasis (Primary)        63-year-old " lady here to see me for epigastric and left upper quadrant abdominal pain and nausea.  These symptoms been present for months but are getting worse.  Pain worse after eating she has some nausea and bloating as well.  Had CT of her abdomen pelvis with gallstones.  On her CT she also has a large volume of food within her stomach.  Given the presence of her gallstones and pain and nausea symptomatic cholelithiasis can be contributing to her current symptoms.  Discussed options with patient to proceed with continued watchful waiting versus proceeding with cholecystectomy and she would like to proceed with cholecystectomy.  If symptoms are not improved after cholecystectomy could be secondary to gastroparesis given her current Ozempic use as well as her diabetes.  She continues to follow-up with GI, will defer to them for further work-up for this if needed.   I discussed risk, benefits and alternatives to robotic cholecystectomy with patient including bowel injury, infection, bleeding, hepatic artery injury and bile duct injury requiring the need for further interventions and patient elected to proceed.          Follow Up   No follow-ups on file.  Patient was given instructions and counseling regarding her condition or for health maintenance advice. Please see specific information pulled into the AVS if appropriate.

## 2023-10-20 LAB
QT INTERVAL: 364 MS
QTC INTERVAL: 455 MS

## 2023-10-24 RX ORDER — GLIMEPIRIDE 1 MG/1
1 TABLET ORAL
Qty: 30 TABLET | Refills: 2 | Status: SHIPPED | OUTPATIENT
Start: 2023-10-24

## 2023-10-27 ENCOUNTER — ANESTHESIA EVENT (OUTPATIENT)
Dept: PERIOP | Facility: HOSPITAL | Age: 63
End: 2023-10-27
Payer: MEDICAID

## 2023-10-27 ENCOUNTER — HOSPITAL ENCOUNTER (OUTPATIENT)
Facility: HOSPITAL | Age: 63
Setting detail: HOSPITAL OUTPATIENT SURGERY
Discharge: HOME | End: 2023-10-27
Attending: STUDENT IN AN ORGANIZED HEALTH CARE EDUCATION/TRAINING PROGRAM | Admitting: STUDENT IN AN ORGANIZED HEALTH CARE EDUCATION/TRAINING PROGRAM
Payer: MEDICAID

## 2023-10-27 ENCOUNTER — ANESTHESIA (OUTPATIENT)
Dept: PERIOP | Facility: HOSPITAL | Age: 63
End: 2023-10-27
Payer: MEDICAID

## 2023-10-27 VITALS
TEMPERATURE: 97.7 F | HEART RATE: 87 BPM | SYSTOLIC BLOOD PRESSURE: 146 MMHG | RESPIRATION RATE: 11 BRPM | HEIGHT: 66 IN | BODY MASS INDEX: 31.4 KG/M2 | DIASTOLIC BLOOD PRESSURE: 71 MMHG | OXYGEN SATURATION: 99 % | WEIGHT: 195.4 LBS

## 2023-10-27 DIAGNOSIS — K80.20 SYMPTOMATIC CHOLELITHIASIS: Primary | ICD-10-CM

## 2023-10-27 LAB
GLUCOSE BLDC GLUCOMTR-MCNC: 102 MG/DL (ref 70–105)
GLUCOSE BLDC GLUCOMTR-MCNC: 163 MG/DL (ref 70–105)

## 2023-10-27 PROCEDURE — 88304 TISSUE EXAM BY PATHOLOGIST: CPT | Performed by: STUDENT IN AN ORGANIZED HEALTH CARE EDUCATION/TRAINING PROGRAM

## 2023-10-27 PROCEDURE — 25010000002 FENTANYL CITRATE (PF) 50 MCG/ML SOLUTION: Performed by: NURSE ANESTHETIST, CERTIFIED REGISTERED

## 2023-10-27 PROCEDURE — 25010000002 HYDROMORPHONE 1 MG/ML SOLUTION: Performed by: NURSE ANESTHETIST, CERTIFIED REGISTERED

## 2023-10-27 PROCEDURE — 25010000002 PROPOFOL 200 MG/20ML EMULSION: Performed by: NURSE ANESTHETIST, CERTIFIED REGISTERED

## 2023-10-27 PROCEDURE — 25010000002 INDOCYANINE GREEN 25 MG RECONSTITUTED SOLUTION: Performed by: STUDENT IN AN ORGANIZED HEALTH CARE EDUCATION/TRAINING PROGRAM

## 2023-10-27 PROCEDURE — 25010000002 FENTANYL CITRATE (PF) 100 MCG/2ML SOLUTION: Performed by: NURSE ANESTHETIST, CERTIFIED REGISTERED

## 2023-10-27 PROCEDURE — 25810000003 LACTATED RINGERS PER 1000 ML: Performed by: ANESTHESIOLOGY

## 2023-10-27 PROCEDURE — 25010000002 DEXAMETHASONE PER 1 MG: Performed by: NURSE ANESTHETIST, CERTIFIED REGISTERED

## 2023-10-27 PROCEDURE — 47563 LAPARO CHOLECYSTECTOMY/GRAPH: CPT | Performed by: STUDENT IN AN ORGANIZED HEALTH CARE EDUCATION/TRAINING PROGRAM

## 2023-10-27 PROCEDURE — 25010000002 BUPIVACAINE (PF) 0.25 % SOLUTION: Performed by: STUDENT IN AN ORGANIZED HEALTH CARE EDUCATION/TRAINING PROGRAM

## 2023-10-27 PROCEDURE — 25010000002 SUGAMMADEX 200 MG/2ML SOLUTION: Performed by: NURSE ANESTHETIST, CERTIFIED REGISTERED

## 2023-10-27 PROCEDURE — 25010000002 ONDANSETRON PER 1 MG: Performed by: NURSE ANESTHETIST, CERTIFIED REGISTERED

## 2023-10-27 PROCEDURE — 82948 REAGENT STRIP/BLOOD GLUCOSE: CPT

## 2023-10-27 PROCEDURE — S0260 H&P FOR SURGERY: HCPCS | Performed by: STUDENT IN AN ORGANIZED HEALTH CARE EDUCATION/TRAINING PROGRAM

## 2023-10-27 PROCEDURE — 25010000002 CEFAZOLIN PER 500 MG: Performed by: STUDENT IN AN ORGANIZED HEALTH CARE EDUCATION/TRAINING PROGRAM

## 2023-10-27 DEVICE — MEDIUM-LARGE LIGATION CLIPS 6 CLIPS/CART
Type: IMPLANTABLE DEVICE | Site: ABDOMEN | Status: FUNCTIONAL
Brand: VAS-Q-CLIP

## 2023-10-27 RX ORDER — PROPOFOL 10 MG/ML
INJECTION, EMULSION INTRAVENOUS AS NEEDED
Status: DISCONTINUED | OUTPATIENT
Start: 2023-10-27 | End: 2023-10-27 | Stop reason: SURG

## 2023-10-27 RX ORDER — ONDANSETRON 2 MG/ML
4 INJECTION INTRAMUSCULAR; INTRAVENOUS ONCE AS NEEDED
Status: DISCONTINUED | OUTPATIENT
Start: 2023-10-27 | End: 2023-10-27 | Stop reason: HOSPADM

## 2023-10-27 RX ORDER — NALOXONE HCL 0.4 MG/ML
0.4 VIAL (ML) INJECTION AS NEEDED
Status: DISCONTINUED | OUTPATIENT
Start: 2023-10-27 | End: 2023-10-27 | Stop reason: HOSPADM

## 2023-10-27 RX ORDER — HYDROCODONE BITARTRATE AND ACETAMINOPHEN 5; 325 MG/1; MG/1
1 TABLET ORAL EVERY 6 HOURS PRN
Qty: 20 TABLET | Refills: 0 | Status: SHIPPED | OUTPATIENT
Start: 2023-10-27

## 2023-10-27 RX ORDER — LIDOCAINE HYDROCHLORIDE 10 MG/ML
0.5 INJECTION, SOLUTION INFILTRATION; PERINEURAL ONCE AS NEEDED
Status: DISCONTINUED | OUTPATIENT
Start: 2023-10-27 | End: 2023-10-27 | Stop reason: HOSPADM

## 2023-10-27 RX ORDER — LIDOCAINE HYDROCHLORIDE 20 MG/ML
INJECTION, SOLUTION EPIDURAL; INFILTRATION; INTRACAUDAL; PERINEURAL AS NEEDED
Status: DISCONTINUED | OUTPATIENT
Start: 2023-10-27 | End: 2023-10-27 | Stop reason: SURG

## 2023-10-27 RX ORDER — FLUMAZENIL 0.1 MG/ML
0.2 INJECTION INTRAVENOUS AS NEEDED
Status: DISCONTINUED | OUTPATIENT
Start: 2023-10-27 | End: 2023-10-27 | Stop reason: HOSPADM

## 2023-10-27 RX ORDER — POLYETHYLENE GLYCOL 3350 17 G/17G
17 POWDER, FOR SOLUTION ORAL DAILY
Qty: 14 EACH | Refills: 0 | Status: SHIPPED | OUTPATIENT
Start: 2023-10-27

## 2023-10-27 RX ORDER — SODIUM CHLORIDE 0.9 % (FLUSH) 0.9 %
10 SYRINGE (ML) INJECTION AS NEEDED
Status: DISCONTINUED | OUTPATIENT
Start: 2023-10-27 | End: 2023-10-27 | Stop reason: HOSPADM

## 2023-10-27 RX ORDER — HYDROCODONE BITARTRATE AND ACETAMINOPHEN 5; 325 MG/1; MG/1
1 TABLET ORAL ONCE AS NEEDED
Status: COMPLETED | OUTPATIENT
Start: 2023-10-27 | End: 2023-10-27

## 2023-10-27 RX ORDER — BUPIVACAINE HYDROCHLORIDE 2.5 MG/ML
INJECTION, SOLUTION EPIDURAL; INFILTRATION; INTRACAUDAL AS NEEDED
Status: DISCONTINUED | OUTPATIENT
Start: 2023-10-27 | End: 2023-10-27 | Stop reason: HOSPADM

## 2023-10-27 RX ORDER — DEXAMETHASONE SODIUM PHOSPHATE 4 MG/ML
INJECTION, SOLUTION INTRA-ARTICULAR; INTRALESIONAL; INTRAMUSCULAR; INTRAVENOUS; SOFT TISSUE AS NEEDED
Status: DISCONTINUED | OUTPATIENT
Start: 2023-10-27 | End: 2023-10-27 | Stop reason: SURG

## 2023-10-27 RX ORDER — FENTANYL CITRATE 50 UG/ML
INJECTION, SOLUTION INTRAMUSCULAR; INTRAVENOUS AS NEEDED
Status: DISCONTINUED | OUTPATIENT
Start: 2023-10-27 | End: 2023-10-27 | Stop reason: SURG

## 2023-10-27 RX ORDER — DROPERIDOL 2.5 MG/ML
0.62 INJECTION, SOLUTION INTRAMUSCULAR; INTRAVENOUS ONCE AS NEEDED
Status: DISCONTINUED | OUTPATIENT
Start: 2023-10-27 | End: 2023-10-27 | Stop reason: HOSPADM

## 2023-10-27 RX ORDER — INDOCYANINE GREEN AND WATER 25 MG
5 KIT INJECTION ONCE
Status: COMPLETED | OUTPATIENT
Start: 2023-10-27 | End: 2023-10-27

## 2023-10-27 RX ORDER — EPHEDRINE SULFATE 5 MG/ML
5 INJECTION INTRAVENOUS ONCE AS NEEDED
Status: DISCONTINUED | OUTPATIENT
Start: 2023-10-27 | End: 2023-10-27 | Stop reason: HOSPADM

## 2023-10-27 RX ORDER — ONDANSETRON 4 MG/1
4 TABLET, FILM COATED ORAL DAILY PRN
Qty: 30 TABLET | Refills: 1 | Status: SHIPPED | OUTPATIENT
Start: 2023-10-27 | End: 2024-10-26

## 2023-10-27 RX ORDER — SODIUM CHLORIDE, SODIUM LACTATE, POTASSIUM CHLORIDE, CALCIUM CHLORIDE 600; 310; 30; 20 MG/100ML; MG/100ML; MG/100ML; MG/100ML
1000 INJECTION, SOLUTION INTRAVENOUS CONTINUOUS
Status: DISCONTINUED | OUTPATIENT
Start: 2023-10-27 | End: 2023-10-27 | Stop reason: HOSPADM

## 2023-10-27 RX ORDER — ACETAMINOPHEN 325 MG/1
650 TABLET ORAL ONCE AS NEEDED
Status: DISCONTINUED | OUTPATIENT
Start: 2023-10-27 | End: 2023-10-27 | Stop reason: HOSPADM

## 2023-10-27 RX ORDER — HYDRALAZINE HYDROCHLORIDE 20 MG/ML
5 INJECTION INTRAMUSCULAR; INTRAVENOUS
Status: DISCONTINUED | OUTPATIENT
Start: 2023-10-27 | End: 2023-10-27 | Stop reason: HOSPADM

## 2023-10-27 RX ORDER — ACETAMINOPHEN 650 MG/1
325 SUPPOSITORY RECTAL EVERY 4 HOURS PRN
Status: DISCONTINUED | OUTPATIENT
Start: 2023-10-27 | End: 2023-10-27 | Stop reason: HOSPADM

## 2023-10-27 RX ORDER — ONDANSETRON 2 MG/ML
INJECTION INTRAMUSCULAR; INTRAVENOUS AS NEEDED
Status: DISCONTINUED | OUTPATIENT
Start: 2023-10-27 | End: 2023-10-27 | Stop reason: SURG

## 2023-10-27 RX ORDER — ROCURONIUM BROMIDE 10 MG/ML
INJECTION, SOLUTION INTRAVENOUS AS NEEDED
Status: DISCONTINUED | OUTPATIENT
Start: 2023-10-27 | End: 2023-10-27 | Stop reason: SURG

## 2023-10-27 RX ORDER — IPRATROPIUM BROMIDE AND ALBUTEROL SULFATE 2.5; .5 MG/3ML; MG/3ML
3 SOLUTION RESPIRATORY (INHALATION) ONCE AS NEEDED
Status: DISCONTINUED | OUTPATIENT
Start: 2023-10-27 | End: 2023-10-27 | Stop reason: HOSPADM

## 2023-10-27 RX ORDER — FENTANYL CITRATE 50 UG/ML
50 INJECTION, SOLUTION INTRAMUSCULAR; INTRAVENOUS
Status: DISCONTINUED | OUTPATIENT
Start: 2023-10-27 | End: 2023-10-27 | Stop reason: HOSPADM

## 2023-10-27 RX ORDER — LABETALOL HYDROCHLORIDE 5 MG/ML
5 INJECTION, SOLUTION INTRAVENOUS
Status: DISCONTINUED | OUTPATIENT
Start: 2023-10-27 | End: 2023-10-27 | Stop reason: HOSPADM

## 2023-10-27 RX ORDER — EPHEDRINE SULFATE 5 MG/ML
INJECTION INTRAVENOUS AS NEEDED
Status: DISCONTINUED | OUTPATIENT
Start: 2023-10-27 | End: 2023-10-27 | Stop reason: SURG

## 2023-10-27 RX ADMIN — EPHEDRINE SULFATE 10 MG: 5 INJECTION INTRAVENOUS at 15:49

## 2023-10-27 RX ADMIN — ROCURONIUM BROMIDE 40 MG: 10 INJECTION, SOLUTION INTRAVENOUS at 15:00

## 2023-10-27 RX ADMIN — HYDROMORPHONE HYDROCHLORIDE 0.5 MG: 1 INJECTION, SOLUTION INTRAMUSCULAR; INTRAVENOUS; SUBCUTANEOUS at 16:00

## 2023-10-27 RX ADMIN — LIDOCAINE HYDROCHLORIDE 100 MG: 20 INJECTION, SOLUTION EPIDURAL; INFILTRATION; INTRACAUDAL; PERINEURAL at 15:00

## 2023-10-27 RX ADMIN — SUGAMMADEX 200 MG: 100 INJECTION, SOLUTION INTRAVENOUS at 15:56

## 2023-10-27 RX ADMIN — FENTANYL CITRATE 50 MCG: 50 INJECTION, SOLUTION INTRAMUSCULAR; INTRAVENOUS at 16:25

## 2023-10-27 RX ADMIN — DEXAMETHASONE SODIUM PHOSPHATE 8 MG: 4 INJECTION, SOLUTION INTRAMUSCULAR; INTRAVENOUS at 15:10

## 2023-10-27 RX ADMIN — EPHEDRINE SULFATE 10 MG: 5 INJECTION INTRAVENOUS at 15:24

## 2023-10-27 RX ADMIN — PROPOFOL 200 MG: 10 INJECTION, EMULSION INTRAVENOUS at 15:00

## 2023-10-27 RX ADMIN — HYDROCODONE BITARTRATE AND ACETAMINOPHEN 1 TABLET: 5; 325 TABLET ORAL at 16:45

## 2023-10-27 RX ADMIN — HYDROMORPHONE HYDROCHLORIDE 0.25 MG: 1 INJECTION, SOLUTION INTRAMUSCULAR; INTRAVENOUS; SUBCUTANEOUS at 17:07

## 2023-10-27 RX ADMIN — HYDROMORPHONE HYDROCHLORIDE 0.5 MG: 1 INJECTION, SOLUTION INTRAMUSCULAR; INTRAVENOUS; SUBCUTANEOUS at 15:57

## 2023-10-27 RX ADMIN — SODIUM CHLORIDE, POTASSIUM CHLORIDE, SODIUM LACTATE AND CALCIUM CHLORIDE 1000 ML: 600; 310; 30; 20 INJECTION, SOLUTION INTRAVENOUS at 12:08

## 2023-10-27 RX ADMIN — HYDROMORPHONE HYDROCHLORIDE 0.25 MG: 1 INJECTION, SOLUTION INTRAMUSCULAR; INTRAVENOUS; SUBCUTANEOUS at 16:39

## 2023-10-27 RX ADMIN — FENTANYL CITRATE 50 MCG: 50 INJECTION, SOLUTION INTRAMUSCULAR; INTRAVENOUS at 15:20

## 2023-10-27 RX ADMIN — FENTANYL CITRATE 50 MCG: 50 INJECTION, SOLUTION INTRAMUSCULAR; INTRAVENOUS at 15:00

## 2023-10-27 RX ADMIN — ONDANSETRON 4 MG: 2 INJECTION INTRAMUSCULAR; INTRAVENOUS at 15:50

## 2023-10-27 RX ADMIN — INDOCYANINE GREEN AND WATER 5 MG: KIT at 13:26

## 2023-10-27 RX ADMIN — CEFAZOLIN 2000 MG: 2 INJECTION, POWDER, FOR SOLUTION INTRAMUSCULAR; INTRAVENOUS at 15:10

## 2023-10-27 RX ADMIN — FENTANYL CITRATE 50 MCG: 50 INJECTION, SOLUTION INTRAMUSCULAR; INTRAVENOUS at 16:54

## 2023-10-27 NOTE — ANESTHESIA PROCEDURE NOTES
Airway  Urgency: elective    Date/Time: 10/27/2023 3:05 PM  Airway not difficult    General Information and Staff    Patient location during procedure: OR  Anesthesiologist: Kavon Elena MD  CRNA/CAA: Radha Nelson CRNA    Indications and Patient Condition  Indications for airway management: airway protection    Preoxygenated: yes (pt pre-O2 with 100% O2)  Mask difficulty assessment: 2 - vent by mask + OA or adjuvant +/- NMBA (easy BMV )    Final Airway Details  Final airway type: endotracheal airway      Successful airway: ETT  Cuffed: yes   Successful intubation technique: direct laryngoscopy  Facilitating devices/methods: intubating stylet  Endotracheal tube insertion site: oral  Blade: Patricia  Blade size: 3  ETT size (mm): 7.0  Cormack-Lehane Classification: grade I - full view of glottis  Placement verified by: chest auscultation and capnometry   Cuff volume (mL): 7  Measured from: lips  ETT/EBT  to lips (cm): 22  Number of attempts at approach: 1  Assessment: lips, teeth, and gum same as pre-op and atraumatic intubation    Additional Comments  ATOETx1. No change in dentition.

## 2023-10-27 NOTE — H&P
General Surgery History and Physical Exam      Name: Judy Koehler ADMIT: 10/27/2023   : 1960  PCP: Debbie Phillips APRN    MRN: [id number] LOS: 0 days   AGE/SEX: 63 y.o. female  ROOM: [facility]      Patient Care Team:  Debbie Phillips APRN as PCP - General (Nurse Practitioner)  Mario Alexander MD as Consulting Physician (General Surgery)  No chief complaint on file.      Subjective   63-year-old lady here to see me for epigastric and left upper quadrant abdominal pain and nausea.  These symptoms been present for months but are getting worse.  Pain worse after eating she has some nausea and bloating as well.  Had CT of her abdomen pelvis with gallstones.  On her CT she also has a large volume of food within her stomach.  Given the presence of her gallstones and pain and nausea symptomatic cholelithiasis can be contributing to her current symptoms.  Discussed options with patient to proceed with continued watchful waiting versus proceeding with cholecystectomy and she would like to proceed with cholecystectomy.  If symptoms are not improved after cholecystectomy could be secondary to gastroparesis given her current Ozempic use as well as her diabetes.  She continues to follow-up with GI, will defer to them for further work-up for this if needed.   I discussed risk, benefits and alternatives to robotic cholecystectomy with patient including bowel injury, infection, bleeding, hepatic artery injury and bile duct injury requiring the need for further interventions and patient elected to proceed.        Past Medical History:   Diagnosis Date    Allergic     Arthritis     Cholelithiasis 2023    Colon polyp 5years    Diabetes mellitus     GERD (gastroesophageal reflux disease)     Headache     Histoplasmosis     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    Hyperlipidemia     Hypertension     Irritable bowel syndrome     Low back pain     Murmur     Obesity     Seasonal allergies      Past Surgical History:    Procedure Laterality Date     SECTION      COLONOSCOPY      JOINT REPLACEMENT  2023    Left shoulder    LUNG BIOPSY      SUBTOTAL HYSTERECTOMY      TONSILLECTOMY      TOTAL SHOULDER ARTHROPLASTY W/ DISTAL CLAVICLE EXCISION Left 2/15/2023    Procedure: TOTAL SHOULDER REVERSE ARTHROPLASTY;  Surgeon: Willian Gomez MD;  Location: [facility] MAIN OR;  Service: Orthopedics;  Laterality: Left;     Family History   Problem Relation Age of Onset    Hypertension Mother     Heart disease Mother     Arthritis Mother     Cancer Mother     Thyroid disease Mother     Diabetes Father     Hyperlipidemia Father     No Known Problems Sister     No Known Problems Sister     No Known Problems Sister     No Known Problems Brother     No Known Problems Brother     No Known Problems Brother     No Known Problems Brother     No Known Problems Son        Social History     Tobacco Use    Smoking status: Every Day     Packs/day: 1.00     Years: 5.00     Additional pack years: 0.00     Total pack years: 5.00     Types: Cigarettes     Passive exposure: Current    Smokeless tobacco: Never   Vaping Use    Vaping Use: Never used   Substance Use Topics    Alcohol use: Never    Drug use: Never     Medications Prior to Admission   Medication Sig Dispense Refill Last Dose    acetaminophen (TYLENOL) 325 MG tablet Take 2 tablets by mouth Every 6 (Six) Hours As Needed for Mild Pain.   Past Month    atorvastatin (LIPITOR) 20 MG tablet TAKE 1 TABLET BY MOUTH EVERY DAY 90 tablet 1 10/26/2023    cetirizine (zyrTEC) 10 MG tablet TAKE 1 TABLET BY MOUTH EVERY DAY 30 tablet 2 10/27/2023    Cyanocobalamin (B-12) 1000 MCG capsule Take  by mouth.   10/20/2023    cyclobenzaprine (FLEXERIL) 10 MG tablet TAKE 1 TABLET BY MOUTH 3 TIMES A DAY AS NEEDED FOR MUSCLE SPASMS. 90 tablet 1 Past Month    dicyclomine (BENTYL) 20 MG tablet Take 1 tablet by mouth 3 (Three) Times a Day.   10/27/2023    glimepiride (AMARYL) 1 MG tablet TAKE 1 TABLET BY MOUTH  EVERY DAY BEFORE BREAKFAST 30 tablet 2 10/26/2023    hydrOXYzine (ATARAX) 50 MG tablet TAKE 1/2 TO 2 TABLETS AS NEEDED FOR ANXIETY OR INSOMNIA 60 tablet 2 Past Week    lisinopril (PRINIVIL,ZESTRIL) 20 MG tablet TAKE 1 TABLET BY MOUTH EVERY DAY 90 tablet 0 10/25/2023    ondansetron (Zofran) 4 MG tablet Take 1 tablet by mouth Every 8 (Eight) Hours As Needed for Nausea or Vomiting. 60 tablet 1     pantoprazole (PROTONIX) 40 MG EC tablet TAKE 1 TABLET BY MOUTH EVERY DAY 90 tablet 1 10/26/2023    promethazine (PHENERGAN) 12.5 MG tablet 1-2 q6h prn 60 tablet 2 Past Week    Semaglutide, 2 MG/DOSE, (OZEMPIC) 8 MG/3ML solution pen-injector Inject 2 mg under the skin into the appropriate area as directed 1 (One) Time Per Week. 12 mL 4 10/13/2023    traMADol (ULTRAM) 50 MG tablet Take 1 tablet by mouth Every 6 (Six) Hours As Needed for Moderate Pain. 240 tablet 0 10/26/2023    vitamin D (ERGOCALCIFEROL) 1.25 MG (05342 UT) capsule capsule TAKE 1 CAPSULE BY MOUTH 1 TIME PER WEEK. 12 capsule 1 10/20/2023    B-D UF III MINI PEN NEEDLES 31G X 5 MM misc USE AS DIRECTED 100 each 6     Blood Glucose Monitoring Suppl (Blood Glucose Monitor System) w/Device kit Check blood sugars tid 1 each 0     diphenhydrAMINE HCl (BENADRYL ALLERGY PO) Take  by mouth. (Patient not taking: Reported on 10/16/2023)   Not Taking    glucose blood test strip Check blood sugar tid 100 each 12     naloxone (NARCAN) 4 MG/0.1ML nasal spray 1 spray into the nostril(s) as directed by provider As Needed (per overdose). (Patient not taking: Reported on 10/16/2023) 2 each 2 Not Taking    pseudoephedrine (Sudafed) 30 MG tablet Take 1 tablet by mouth Every 4 (Four) Hours As Needed for Congestion. (Patient not taking: Reported on 10/16/2023) 60 tablet 2 Not Taking    Triamcinolone Acetonide (Nasacort Allergy 24HR) 55 MCG/ACT nasal inhaler 2 sprays into the nostril(s) as directed by provider Daily. (Patient not taking: Reported on 10/16/2023) 16.5 g 2 More than a month  "    ceFAZolin, 2,000 mg, Intravenous, Once      lactated ringers, 1,000 mL, Last Rate: 1,000 mL (10/27/23 1208)        lidocaine    sodium chloride  Patient has no known allergies.    Review of Systems   Constitutional:  Negative for chills and fever.   HENT:  Negative for rhinorrhea and trouble swallowing.    Eyes:  Negative for blurred vision and double vision.   Respiratory:  Negative for cough and shortness of breath.    Cardiovascular:  Negative for chest pain and palpitations.   Gastrointestinal:  Negative for abdominal distention and abdominal pain.   Musculoskeletal:  Negative for back pain and myalgias.   Skin:  Negative for color change and dry skin.   Neurological:  Negative for headache and confusion.   Psychiatric/Behavioral:  Negative for agitation and hallucinations.         Objective     Vital Signs and Labs:  Vital Signs Patient Vitals for the past 24 hrs:   BP Temp Temp src Pulse Resp SpO2 Height Weight   10/27/23 1212 130/74 97.7 °F (36.5 °C) Oral 79 22 97 % 167.6 cm (66\") 88.6 kg (195 lb 6.4 oz)     I/O:  No intake/output data recorded.    Physical Exam:  Physical Exam  Constitutional:       General: She is not in acute distress.     Appearance: Normal appearance. She is not ill-appearing.   HENT:      Head: Normocephalic and atraumatic.      Right Ear: External ear normal.      Left Ear: External ear normal.   Eyes:      Extraocular Movements: Extraocular movements intact.      Conjunctiva/sclera: Conjunctivae normal.   Cardiovascular:      Rate and Rhythm: Normal rate and regular rhythm.   Pulmonary:      Effort: Pulmonary effort is normal. No respiratory distress.   Abdominal:      General: There is no distension.      Palpations: Abdomen is soft.      Tenderness: There is no abdominal tenderness.   Musculoskeletal:         General: No swelling or deformity.   Skin:     General: Skin is warm and dry.   Neurological:      Mental Status: She is alert and oriented to person, place, and time. " Mental status is at baseline.         CBC      BMP     Radiology(recent) No radiology results for the last day    I reviewed the patient's new clinical results.    Assessment & Plan       Symptomatic cholelithiasis      63-year-old lady here to see me for epigastric and left upper quadrant abdominal pain and nausea.  These symptoms been present for months but are getting worse.  Pain worse after eating she has some nausea and bloating as well.  Had CT of her abdomen pelvis with gallstones.  On her CT she also has a large volume of food within her stomach.  Given the presence of her gallstones and pain and nausea symptomatic cholelithiasis can be contributing to her current symptoms.  Discussed options with patient to proceed with continued watchful waiting versus proceeding with cholecystectomy and she would like to proceed with cholecystectomy.  If symptoms are not improved after cholecystectomy could be secondary to gastroparesis given her current Ozempic use as well as her diabetes.  She continues to follow-up with GI, will defer to them for further work-up for this if needed.   I discussed risk, benefits and alternatives to robotic cholecystectomy with patient including bowel injury, infection, bleeding, hepatic artery injury and bile duct injury requiring the need for further interventions and patient elected to proceed.          This note was created using Dragon Voice Recognition software.    Mario Alexander MD  10/27/23  14:16 EDT

## 2023-10-27 NOTE — ANESTHESIA PREPROCEDURE EVALUATION
Anesthesia Evaluation     NPO Solid Status: > 8 hours  NPO Liquid Status: > 8 hours           Airway   Mallampati: II  TM distance: >3 FB  Neck ROM: full  No difficulty expected  Dental - normal exam     Pulmonary - normal exam   Cardiovascular - normal exam    (+) hypertension well controlled, valvular problems/murmurs, hyperlipidemia      Neuro/Psych  (+) headaches  GI/Hepatic/Renal/Endo    (+) obesity, GERD well controlled, diabetes mellitus type 2    Musculoskeletal     Abdominal  - normal exam    Bowel sounds: normal.   Substance History      OB/GYN          Other   arthritis,                 Anesthesia Plan    ASA 3     general     intravenous induction     Anesthetic plan, risks, benefits, and alternatives have been provided, discussed and informed consent has been obtained with: patient.  Pre-procedure education provided  Plan discussed with CRNA and CAA.    CODE STATUS:

## 2023-10-30 ENCOUNTER — PATIENT MESSAGE (OUTPATIENT)
Dept: SURGERY | Facility: CLINIC | Age: 63
End: 2023-10-30
Payer: MEDICAID

## 2023-10-30 NOTE — ANESTHESIA POSTPROCEDURE EVALUATION
Patient: Judy Koehler    Procedure Summary       Date: 10/27/23 Room / Location: [facility] OR 08 / [facility] MAIN OR    Anesthesia Start: 1455 Anesthesia Stop: 1617    Procedure: CHOLECYSTECTOMY LAPAROSCOPIC WITH DAVINCI ROBOT (Abdomen) Diagnosis:       Symptomatic cholelithiasis      (Symptomatic cholelithiasis [K80.20])    Surgeons: Mario Alexander MD Provider: Kavon Elena MD    Anesthesia Type: general ASA Status: 3            Anesthesia Type: general    Vitals  Vitals Value Taken Time   /82 10/27/23 1723   Temp 97.5 °F (36.4 °C) 10/27/23 1723   Pulse 86 10/27/23 1723   Resp 12 10/27/23 1723   SpO2 100 % 10/27/23 1723           Post Anesthesia Care and Evaluation    Patient location during evaluation: PACU  Patient participation: complete - patient participated  Level of consciousness: awake  Pain scale: See nurse's notes for pain score.  Pain management: adequate    Airway patency: patent  Anesthetic complications: No anesthetic complications  PONV Status: none  Cardiovascular status: acceptable  Respiratory status: acceptable and spontaneous ventilation  Hydration status: acceptable    Comments: Patient seen and examined postoperatively; vital signs stable; SpO2 greater than or equal to 90%; cardiopulmonary status stable; nausea/vomiting adequately controlled; pain adequately controlled; no apparent anesthesia complications; patient discharged from anesthesia care when discharge criteria were met

## 2023-10-31 ENCOUNTER — TELEPHONE (OUTPATIENT)
Dept: SURGERY | Facility: CLINIC | Age: 63
End: 2023-10-31
Payer: MEDICAID

## 2023-10-31 LAB
LAB AP CASE REPORT: NORMAL
PATH REPORT.FINAL DX SPEC: NORMAL
PATH REPORT.GROSS SPEC: NORMAL

## 2023-10-31 NOTE — TELEPHONE ENCOUNTER
Per Dr. Alexander:  Looks like a stitch pulled through. It will still heal from the inside.  Nothing to cause concern.    Called and information given to patient.

## 2023-11-09 RX ORDER — CETIRIZINE HYDROCHLORIDE 10 MG/1
TABLET ORAL
Qty: 30 TABLET | Refills: 2 | Status: SHIPPED | OUTPATIENT
Start: 2023-11-09

## 2023-11-11 NOTE — OP NOTE
Operative Report:    Patient Name:  Judy Koehler  YOB: 1960    Date of Surgery:  10/27/2023     Indications:    63-year-old lady here to see me for epigastric and left upper quadrant abdominal pain and nausea.  These symptoms been present for months but are getting worse.  Pain worse after eating she has some nausea and bloating as well.  Had CT of her abdomen pelvis with gallstones.  On her CT she also has a large volume of food within her stomach.  Given the presence of her gallstones and pain and nausea symptomatic cholelithiasis can be contributing to her current symptoms.  Discussed options with patient to proceed with continued watchful waiting versus proceeding with cholecystectomy and she would like to proceed with cholecystectomy.  If symptoms are not improved after cholecystectomy could be secondary to gastroparesis given her current Ozempic use as well as her diabetes.  She continues to follow-up with GI, will defer to them for further work-up for this if needed.   I discussed risk, benefits and alternatives to robotic cholecystectomy with patient including bowel injury, infection, bleeding, hepatic artery injury and bile duct injury requiring the need for further interventions and patient elected to proceed.        Pre-op Diagnosis:   Symptomatic cholelithiasis [K80.20]       Post-Op Diagnosis Codes:     * Symptomatic cholelithiasis [K80.20]    Procedure/CPT® Codes:      Procedure(s):  CHOLECYSTECTOMY LAPAROSCOPIC WITH DAVINCI ROBOT    Staff:  Surgeon(s):  Mario Alexander MD    Circulator: Arcelia Andrews RN; Sarita Michele RN; Hannah Miller RN  Scrub Person: Mel Brunner; Gregoria Boss  Assistant: Gilmar Jade CSA  was responsible for performing the following activities: Retraction, Suction, Irrigation, Suturing, Closing, and Held/Positioned Camera and their skilled assistance was necessary for the success of this case.        Anesthesia: General    Estimated  Blood Loss: minimal    Implants:    Implant Name Type Inv. Item Serial No.  Lot No. LRB No. Used Action   CARTRDG CLIP LIGAT VASQCLIP [id number] NEMO MCQUEEN - [id number] Implant CARTRDG CLIP LIGAT VASQCLIP [id number] MD/LINDA MCQUEEN  [employer] [id number] N/A 1 Implanted       Specimen:          Specimens       ID Source Type Tests Collected By Collected At Frozen?    A Gallbladder Tissue TISSUE PATHOLOGY EXAM   Mario Alexander MD 10/27/23 8553 No                Findings: Chronically inflamed appearing gallbladder    Complications: None    Description of Procedure:   After risk benefits and alternatives were discussed with patient informed sent was obtained.  She was transported the operating room placed supine operating table underwent general anesthesia.  Her abdomen is prepped draped sterile fashion surgical timeout completed.  Inserted a 5 mm trocar in the left upper quadrant or laparoscopic visualization.  Insufflated the peritoneal cavity inspected the area below my entry site with no injury identified.  Patient was placed in reverse Trendelenburg position.  I placed an 8 mm trocar in the right lower quadrant 8 mm trocar through the right rectus muscle 12 mm trocars in the left rectus muscle and upsized major trocar to an 8 mm robotic trocar.  Robot was brought in and docked and I proceeded to the robotic console.    Return my attention the right upper quadrant aggressive dome of the gallbladder and retracted this toward the right upper quadrant.  Was able to identify the infundibulum open the peritoneum along the anterior and posterior surfaces of the infundibulum of the inferior one third of the gallbladder fossa.  I dissected this triangle all fat and fibrous tissue until there are only 2 structures remaining entering directly into the gallbladder.  Once good will cool view of safety was obtained I placed 3 clips on the cystic duct and 2 clips on the cystic artery.  I divided the cystic duct  between the clips electrocautery on cut mode and the cystic artery above the clips with electrocautery.  I use electrocautery to dissect the gallbladder off the gallbladder fossa.  Gallbladder placed in Endo Catch bag and removed from the 12 mm port site.  Fascia the 12 mm port site was closed with 0 Vicryl suture using a laparoscopic suture passer.  The abdomen was desufflated the remaining trocars were removed.  Skin was reapproximated with 4-0 Vicryl suture and covered surgical glue.  Patient was woken from general anesthesia and transported recovery in without incident.      Mario Alexander MD     Date: 11/11/2023  Time: 10:20 EST    This note was created using Dragon Voice Recognition software.

## 2023-11-20 ENCOUNTER — OFFICE VISIT (OUTPATIENT)
Dept: SURGERY | Facility: CLINIC | Age: 63
End: 2023-11-20
Payer: MEDICAID

## 2023-11-20 VITALS
TEMPERATURE: 97.8 F | SYSTOLIC BLOOD PRESSURE: 134 MMHG | BODY MASS INDEX: 32.53 KG/M2 | OXYGEN SATURATION: 99 % | HEART RATE: 92 BPM | DIASTOLIC BLOOD PRESSURE: 88 MMHG | HEIGHT: 66 IN | WEIGHT: 202.4 LBS

## 2023-11-20 DIAGNOSIS — K80.20 SYMPTOMATIC CHOLELITHIASIS: Primary | ICD-10-CM

## 2023-11-20 PROCEDURE — 99024 POSTOP FOLLOW-UP VISIT: CPT | Performed by: STUDENT IN AN ORGANIZED HEALTH CARE EDUCATION/TRAINING PROGRAM

## 2023-11-20 PROCEDURE — 3075F SYST BP GE 130 - 139MM HG: CPT | Performed by: STUDENT IN AN ORGANIZED HEALTH CARE EDUCATION/TRAINING PROGRAM

## 2023-11-20 PROCEDURE — 1159F MED LIST DOCD IN RCRD: CPT | Performed by: STUDENT IN AN ORGANIZED HEALTH CARE EDUCATION/TRAINING PROGRAM

## 2023-11-20 PROCEDURE — 1160F RVW MEDS BY RX/DR IN RCRD: CPT | Performed by: STUDENT IN AN ORGANIZED HEALTH CARE EDUCATION/TRAINING PROGRAM

## 2023-11-20 PROCEDURE — 3079F DIAST BP 80-89 MM HG: CPT | Performed by: STUDENT IN AN ORGANIZED HEALTH CARE EDUCATION/TRAINING PROGRAM

## 2023-11-21 RX ORDER — HYDROXYZINE 50 MG/1
TABLET, FILM COATED ORAL
Qty: 60 TABLET | Refills: 2 | Status: SHIPPED | OUTPATIENT
Start: 2023-11-21

## 2023-11-21 NOTE — PROGRESS NOTES
"Chief Complaint  No chief complaint on file.    Subjective        Judy Koehler presents to [facility] GENERAL SURGERY  History of Present Illness    63-year-old lady here for follow-up after cholecystectomy.  Doing well no issues or complaints.  Discussed her pathology results showing no malignancy.  No further restrictions from my standpoint she can follow-up me again in the future if needed.    Objective   Vital Signs:  /88   Pulse 92   Temp 97.8 °F (36.6 °C) (Infrared)   Ht 167.6 cm (66\")   Wt 91.8 kg (202 lb 6.4 oz)   SpO2 99%   BMI 32.67 kg/m²   Estimated body mass index is 32.67 kg/m² as calculated from the following:    Height as of this encounter: 167.6 cm (66\").    Weight as of this encounter: 91.8 kg (202 lb 6.4 oz).               Physical Exam  Constitutional:       General: She is not in acute distress.     Appearance: Normal appearance. She is not ill-appearing.   HENT:      Head: Normocephalic and atraumatic.      Right Ear: External ear normal.      Left Ear: External ear normal.   Eyes:      Extraocular Movements: Extraocular movements intact.      Conjunctiva/sclera: Conjunctivae normal.   Cardiovascular:      Rate and Rhythm: Normal rate and regular rhythm.   Pulmonary:      Effort: Pulmonary effort is normal. No respiratory distress.   Abdominal:      General: There is no distension.      Palpations: Abdomen is soft.      Tenderness: There is no abdominal tenderness.   Musculoskeletal:         General: No swelling or deformity.   Skin:     General: Skin is warm and dry.   Neurological:      Mental Status: She is alert and oriented to person, place, and time. Mental status is at baseline.        Result Review :                   Assessment and Plan   Diagnoses and all orders for this visit:    1. Symptomatic cholelithiasis (Primary)          63-year-old lady here for follow-up after cholecystectomy.  Doing well no issues or complaints.  Discussed her pathology results " showing no malignancy.  No further restrictions from my standpoint she can follow-up me again in the future if needed.         Follow Up   No follow-ups on file.  Patient was given instructions and counseling regarding her condition or for health maintenance advice. Please see specific information pulled into the AVS if appropriate.

## 2023-11-27 RX ORDER — LISINOPRIL 20 MG/1
TABLET ORAL
Qty: 90 TABLET | Refills: 0 | Status: SHIPPED | OUTPATIENT
Start: 2023-11-27

## 2023-12-05 ENCOUNTER — TELEPHONE (OUTPATIENT)
Dept: FAMILY MEDICINE CLINIC | Facility: CLINIC | Age: 63
End: 2023-12-05
Payer: MEDICAID

## 2023-12-11 RX ORDER — ERGOCALCIFEROL 1.25 MG/1
CAPSULE ORAL
Qty: 12 CAPSULE | Refills: 1 | Status: SHIPPED | OUTPATIENT
Start: 2023-12-11

## 2023-12-12 ENCOUNTER — OFFICE VISIT (OUTPATIENT)
Dept: FAMILY MEDICINE CLINIC | Facility: CLINIC | Age: 63
End: 2023-12-12
Payer: MEDICAID

## 2023-12-12 VITALS
HEIGHT: 66 IN | HEART RATE: 90 BPM | SYSTOLIC BLOOD PRESSURE: 130 MMHG | WEIGHT: 199 LBS | BODY MASS INDEX: 31.98 KG/M2 | TEMPERATURE: 97 F | DIASTOLIC BLOOD PRESSURE: 80 MMHG | OXYGEN SATURATION: 100 %

## 2023-12-12 DIAGNOSIS — I10 PRIMARY HYPERTENSION: ICD-10-CM

## 2023-12-12 DIAGNOSIS — E11.9 TYPE 2 DIABETES MELLITUS WITHOUT COMPLICATION, WITHOUT LONG-TERM CURRENT USE OF INSULIN: Primary | ICD-10-CM

## 2023-12-12 DIAGNOSIS — E78.2 MIXED HYPERLIPIDEMIA: ICD-10-CM

## 2023-12-12 DIAGNOSIS — Z23 NEED FOR INFLUENZA VACCINATION: ICD-10-CM

## 2023-12-12 RX ORDER — GLIMEPIRIDE 2 MG/1
2 TABLET ORAL
Qty: 30 TABLET | Refills: 1 | Status: SHIPPED | OUTPATIENT
Start: 2023-12-12

## 2023-12-12 NOTE — PROGRESS NOTES
"Chief Complaint  Abdominal Pain and Diabetes        Judy Koehler presents to [facility] MEDIC[facility]        Subjective      63-year-old female patient presents today to follow-up on diabetes.  Past medical history of migraines, GERD, partial hysterectomy, type 2 diabetes, hyperlipidemia, insomnia, IBS.    Underlying history of hypertension and hyperlipidemia.  Currently on a regimen of Lipitor 20 mg daily, lisinopril 20 mg daily, blood pressure today 130/80.  Asymptomatic no chest pain shortness of breath visual disturbances.    Patient previously saw Debbie Phillips, she was complaining of LUQ abdominal pain.  Ultrasound imaging in past was negative has had an EGD colonoscopy that was normal. She had cholecystectomy six week ago and pain was better for a while after surgery however she reports the past few weeks she is with pain across RUQ and LUQ \"sore across\" and \"achy\". Doing strenuous activity aggravates. Currently not on semaglutide as there is a national shortage. Has been out for a week and still having stomach discomfort even after missing a dose for two weeks.    She is currently 199 pounds which is up from last visit in August with Debbie.  CBC CMP were normal but AST and ALT were not evaluated.  Last A1c was 6.3 back in June. Glucose has been running 100-135 on average. She has called around other pharmacies and has not been unsuccessful in finding it. She is past due for eye exam, previously saw provider in [place]. Recommended local providers to see.     She would like to receive her flu vaccination today.                    Objective         Vital Signs:     /80 (BP Location: Right arm, Patient Position: Sitting, Cuff Size: Adult)   Pulse 90   Temp 97 °F (36.1 °C) (Infrared)   Ht 167.6 cm (65.98\")   Wt 90.3 kg (199 lb)   SpO2 100%   BMI 32.14 kg/m²       Physical Exam  Vitals reviewed.   Constitutional:       Appearance: She is well-developed.      Comments:    "   HENT:      Head: Normocephalic and atraumatic.      Nose: Nose normal.      Mouth/Throat:      Mouth: Mucous membranes are moist.      Pharynx: Oropharynx is clear.   Eyes:      Conjunctiva/sclera: Conjunctivae normal.      Pupils: Pupils are equal, round, and reactive to light.   Cardiovascular:      Rate and Rhythm: Normal rate and regular rhythm.      Pulses: Normal pulses.      Heart sounds: Normal heart sounds.   Pulmonary:      Effort: Pulmonary effort is normal. No respiratory distress.      Breath sounds: Normal breath sounds. No stridor. No wheezing, rhonchi or rales.   Chest:      Chest wall: No tenderness.   Abdominal:      General: Abdomen is protuberant. Bowel sounds are normal.      Palpations: Abdomen is soft.      Tenderness: There is no abdominal tenderness.          Comments: Laparoscopic incision CDI soft.   Musculoskeletal:         General: Normal range of motion.      Cervical back: Normal range of motion.   Skin:     General: Skin is warm and dry.      Findings: No rash.   Neurological:      Mental Status: She is alert and oriented to person, place, and time.   Psychiatric:         Behavior: Behavior normal.                History of Present Illness      Patient Active Problem List   Diagnosis    Gastroesophageal reflux disease    Hypertension    Migraine    Mixed hyperlipidemia    Plantar fasciitis of left foot    Rupture of hamstring tendon    Type 2 diabetes mellitus without complication, without long-term current use of insulin    Traumatic complete tear of left rotator cuff    Labral tear of long head of left biceps tendon    Tobacco abuse    Other insomnia    Irritable bowel syndrome with both constipation and diarrhea    DJD of left shoulder    S/P reverse total shoulder arthroplasty, left    Drug-induced constipation    Flank strain, initial encounter    Left upper quadrant abdominal pain    Symptomatic cholelithiasis    Need for influenza vaccination         Past Medical History:    Diagnosis Date    Allergic     Arthritis     Cholelithiasis 2023    Colon polyp 5years    Diabetes mellitus     GERD (gastroesophageal reflux disease)     Headache     Histoplasmosis     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    Hyperlipidemia     Hypertension     Irritable bowel syndrome     Low back pain     Murmur     Obesity     Seasonal allergies           Family History   Problem Relation Age of Onset    Hypertension Mother     Heart disease Mother     Arthritis Mother     Cancer Mother     Thyroid disease Mother     Diabetes Father     Hyperlipidemia Father     No Known Problems Sister     No Known Problems Sister     No Known Problems Sister     No Known Problems Brother     No Known Problems Brother     No Known Problems Brother     No Known Problems Brother     No Known Problems Son           Past Surgical History:   Procedure Laterality Date     SECTION      CHOLECYSTECTOMY N/A 10/27/2023    Procedure: CHOLECYSTECTOMY LAPAROSCOPIC WITH DAVINCI ROBOT;  Surgeon: Mario Alexander MD;  Location: [facility];  Service: Robotics - DaVinci;  Laterality: N/A;    COLONOSCOPY      JOINT REPLACEMENT  2023    Left shoulder    LUNG BIOPSY      SUBTOTAL HYSTERECTOMY      TONSILLECTOMY      TOTAL SHOULDER ARTHROPLASTY W/ DISTAL CLAVICLE EXCISION Left 2/15/2023    Procedure: TOTAL SHOULDER REVERSE ARTHROPLASTY;  Surgeon: Willian Gomez MD;  Location: [facility];  Service: Orthopedics;  Laterality: Left;          Social History     Socioeconomic History    Marital status:    Tobacco Use    Smoking status: Every Day     Packs/day: 1.00     Years: 5.00     Additional pack years: 0.00     Total pack years: 5.00     Types: Cigarettes     Passive exposure: Current    Smokeless tobacco: Never   Vaping Use    Vaping Use: Never used   Substance and Sexual Activity    Alcohol use: Never    Drug use: Never    Sexual activity: Yes     Partners: Male     Birth control/protection: None                    Result  Review :                                                  Assessment and Plan      Diagnoses and all orders for this visit:    1. Type 2 diabetes mellitus without complication, without long-term current use of insulin (Primary)  -     Semaglutide, 1 MG/DOSE, (OZEMPIC) 2 MG/1.5ML solution pen-injector; Inject 1 mg under the skin into the appropriate area as directed 1 (One) Time Per Week.  Dispense: 3 mL; Refill: 0  -     glimepiride (Amaryl) 2 MG tablet; Take 1 tablet by mouth Every Morning Before Breakfast.  Dispense: 30 tablet; Refill: 1  -     Hemoglobin A1c  -     Microalbumin / Creatinine Urine Ratio - Urine, Clean Catch    2. Need for influenza vaccination  -     Fluzone (or Fluarix & Flulaval for VFC) >6mos    3. Mixed hyperlipidemia    4. Primary hypertension      Patient's previous LUQ pain likely related to gallbladder issues.  She recovered well after surgery but reports continued achiness and discomfort across right and left upper quadrant.  This is likely postsurgical discomfort inflammation.  I would like to give her a few more weeks and see if this subsides.  Do not believe it is underlying due to the semaglutide.    Underlying diabetes, A1c has not been evaluated in about 6 months last level was 6.3.  Patient reports pharmacies are out of the semaglutide dose that she was on previously, we will back down to the previous dose and see if that is in stock.  Due to backing down on the dose would like to increase the Amaryl up to 2 mg daily.  Patient to continue monitoring glucose levels.            ollow Up       Return in about 3 months (around 3/12/2024) for with KARUNA Boggs.      Patient was given instructions and counseling regarding her condition or for health maintenance advice. Please see specific information pulled into the AVS if appropriate.     Brenda Mota, APRN12/12/202312:04 EST  This note has been electronically signed      Answers submitted by the patient for this  visit:  Primary Reason for Visit (Submitted on 12/11/2023)  What is the primary reason for your visit?: Diabetes

## 2023-12-13 LAB
ALBUMIN/CREAT UR: 11 MG/G CREAT (ref 0–29)
CREAT UR-MCNC: 42.7 MG/DL
HBA1C MFR BLD: 6.7 % (ref 4.8–5.6)
MICROALBUMIN UR-MCNC: 4.5 UG/ML

## 2023-12-13 NOTE — PROGRESS NOTES
Please advise A1c is 6.7 slightly elevated from where it was 6 months ago at 6.3.  I presume she has picked up the lower dose of the semaglutide since I did not hear back from her yesterday.  We can continue on this lower dose until they are back in stock of the higher dose if that is the case.  Otherwise if she has not picked it up due to being out of stock, please have her reach out so I can change to a different treatment altogether.

## 2023-12-27 RX ORDER — BLOOD SUGAR DIAGNOSTIC
STRIP MISCELLANEOUS
Qty: 90 EACH | Refills: 12 | Status: SHIPPED | OUTPATIENT
Start: 2023-12-27

## 2024-01-02 ENCOUNTER — TELEPHONE (OUTPATIENT)
Dept: FAMILY MEDICINE CLINIC | Facility: CLINIC | Age: 64
End: 2024-01-02
Payer: MEDICAID

## 2024-01-02 DIAGNOSIS — E11.9 TYPE 2 DIABETES MELLITUS WITHOUT COMPLICATION, WITHOUT LONG-TERM CURRENT USE OF INSULIN: Primary | ICD-10-CM

## 2024-01-02 NOTE — TELEPHONE ENCOUNTER
Received alternative request for the accu check test strip, states non formulary through insurance.

## 2024-01-04 DIAGNOSIS — E11.9 TYPE 2 DIABETES MELLITUS WITHOUT COMPLICATION, WITHOUT LONG-TERM CURRENT USE OF INSULIN: ICD-10-CM

## 2024-01-08 DIAGNOSIS — E11.9 TYPE 2 DIABETES MELLITUS WITHOUT COMPLICATION, WITHOUT LONG-TERM CURRENT USE OF INSULIN: ICD-10-CM

## 2024-01-08 RX ORDER — GLIMEPIRIDE 2 MG/1
2 TABLET ORAL
Qty: 90 TABLET | Refills: 1 | Status: SHIPPED | OUTPATIENT
Start: 2024-01-08 | End: 2024-01-15 | Stop reason: SDUPTHER

## 2024-01-08 RX ORDER — PANTOPRAZOLE SODIUM 40 MG/1
TABLET, DELAYED RELEASE ORAL
Qty: 90 TABLET | Refills: 1 | Status: SHIPPED | OUTPATIENT
Start: 2024-01-08

## 2024-01-12 ENCOUNTER — OFFICE VISIT (OUTPATIENT)
Dept: FAMILY MEDICINE CLINIC | Facility: CLINIC | Age: 64
End: 2024-01-12
Payer: MEDICAID

## 2024-01-12 VITALS
DIASTOLIC BLOOD PRESSURE: 77 MMHG | TEMPERATURE: 97.5 F | SYSTOLIC BLOOD PRESSURE: 125 MMHG | HEART RATE: 96 BPM | HEIGHT: 66 IN | WEIGHT: 198.2 LBS | BODY MASS INDEX: 31.85 KG/M2 | OXYGEN SATURATION: 100 %

## 2024-01-12 DIAGNOSIS — Z90.49 STATUS POST CHOLECYSTECTOMY: ICD-10-CM

## 2024-01-12 DIAGNOSIS — Z47.89 ORTHOPEDIC AFTERCARE: ICD-10-CM

## 2024-01-12 DIAGNOSIS — Z76.0 MEDICATION REFILL: ICD-10-CM

## 2024-01-12 DIAGNOSIS — R10.11 RIGHT UPPER QUADRANT ABDOMINAL PAIN: Primary | ICD-10-CM

## 2024-01-12 RX ORDER — TRAMADOL HYDROCHLORIDE 50 MG/1
50 TABLET ORAL EVERY 8 HOURS PRN
Qty: 120 TABLET | Refills: 0 | Status: SHIPPED | OUTPATIENT
Start: 2024-01-12

## 2024-01-12 NOTE — PROGRESS NOTES
"Chief Complaint  Abdominal Pain        Judy Koehler presents to [facility]        Subjective      63-year-old female patient presents today with complaints of abdominal pain.    RUQ pain for several weeks. Had kari removed October 2023. No N/V/D. Pain does not subside.Even water in the shower is painful on the RUQ of abdomen.  Aggravating factors are any movement. Tramadol eases up the pain. Has taken ibuprofen and tylenol as well but without any benefit.                    Objective         Vital Signs:     /77 (BP Location: Right arm, Patient Position: Sitting, Cuff Size: Adult)   Pulse 96   Temp 97.5 °F (36.4 °C) (Infrared)   Ht 167.6 cm (65.98\")   Wt 89.9 kg (198 lb 3.2 oz)   SpO2 100%   BMI 32.01 kg/m²       Physical Exam  Vitals reviewed.   Constitutional:       Appearance: She is well-developed.      Comments:      HENT:      Head: Normocephalic and atraumatic.      Nose: Nose normal.      Mouth/Throat:      Mouth: Mucous membranes are moist.      Pharynx: Oropharynx is clear.   Eyes:      Conjunctiva/sclera: Conjunctivae normal.      Pupils: Pupils are equal, round, and reactive to light.   Cardiovascular:      Rate and Rhythm: Normal rate.   Pulmonary:      Effort: Pulmonary effort is normal. No respiratory distress.   Abdominal:      General: Abdomen is protuberant. Bowel sounds are decreased. There is no distension. There are no signs of injury.      Palpations: Abdomen is soft. There is no shifting dullness, fluid wave, hepatomegaly, splenomegaly, mass or pulsatile mass.      Tenderness: There is abdominal tenderness in the right upper quadrant and epigastric area.          Comments: Bowel sounds hypoactive throughout.   Musculoskeletal:         General: Normal range of motion.      Cervical back: Normal range of motion.   Skin:     General: Skin is warm and dry.      Findings: No rash.   Neurological:      Mental Status: She is alert and oriented to " person, place, and time.   Psychiatric:         Behavior: Behavior normal.                History of Present Illness      Patient Active Problem List   Diagnosis    Gastroesophageal reflux disease    Hypertension    Migraine    Mixed hyperlipidemia    Plantar fasciitis of left foot    Rupture of hamstring tendon    Type 2 diabetes mellitus without complication, without long-term current use of insulin    Traumatic complete tear of left rotator cuff    Labral tear of long head of left biceps tendon    Tobacco abuse    Other insomnia    Irritable bowel syndrome with both constipation and diarrhea    DJD of left shoulder    S/P reverse total shoulder arthroplasty, left    Drug-induced constipation    Flank strain, initial encounter    Left upper quadrant abdominal pain    Symptomatic cholelithiasis    Need for influenza vaccination    Right upper quadrant abdominal pain    Orthopedic aftercare    Medication refill    Status post cholecystectomy         Past Medical History:   Diagnosis Date    Allergic     Arthritis     Cholelithiasis 2023    Colon polyp 5years    Diabetes mellitus     GERD (gastroesophageal reflux disease)     Headache     Histoplasmosis     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    Hyperlipidemia     Hypertension     Irritable bowel syndrome     Low back pain     Murmur     Obesity     Seasonal allergies           Family History   Problem Relation Age of Onset    Hypertension Mother     Heart disease Mother     Arthritis Mother     Cancer Mother     Thyroid disease Mother     Diabetes Father     Hyperlipidemia Father     No Known Problems Sister     No Known Problems Sister     No Known Problems Sister     No Known Problems Brother     No Known Problems Brother     No Known Problems Brother     No Known Problems Brother     No Known Problems Son           Past Surgical History:   Procedure Laterality Date     SECTION      CHOLECYSTECTOMY N/A 10/27/2023    Procedure: CHOLECYSTECTOMY LAPAROSCOPIC WITH EB  ROBOT;  Surgeon: Mario Alexander MD;  Location: [facility] MAIN OR;  Service: Robotics - DaVinci;  Laterality: N/A;    COLONOSCOPY      JOINT REPLACEMENT  Feb 2023    Left shoulder    LUNG BIOPSY      SUBTOTAL HYSTERECTOMY      TONSILLECTOMY      TOTAL SHOULDER ARTHROPLASTY W/ DISTAL CLAVICLE EXCISION Left 2/15/2023    Procedure: TOTAL SHOULDER REVERSE ARTHROPLASTY;  Surgeon: Willian Gomez MD;  Location: [facility] MAIN OR;  Service: Orthopedics;  Laterality: Left;          Social History     Socioeconomic History    Marital status:    Tobacco Use    Smoking status: Every Day     Packs/day: 1.00     Years: 5.00     Additional pack years: 0.00     Total pack years: 5.00     Types: Cigarettes     Passive exposure: Current    Smokeless tobacco: Never   Vaping Use    Vaping Use: Never used   Substance and Sexual Activity    Alcohol use: Never    Drug use: Never    Sexual activity: Yes     Partners: Male     Birth control/protection: None                    Result Review :                                                  Assessment and Plan      Diagnoses and all orders for this visit:    1. Right upper quadrant abdominal pain (Primary)  -     XR Abdomen KUB (In Office)  -     AlP+ALT+AST+Creat+LD+TBili+..  -     Amylase  -     Lipase  -     CT Abdomen Pelvis Without Contrast; Future    2. Status post cholecystectomy  -     AlP+ALT+AST+Creat+LD+TBili+..  -     Amylase  -     Lipase  -     CT Abdomen Pelvis Without Contrast; Future    3. Orthopedic aftercare  -     traMADol (ULTRAM) 50 MG tablet; Take 1 tablet by mouth Every 8 (Eight) Hours As Needed for Moderate Pain.  Dispense: 120 tablet; Refill: 0    4. Medication refill        Patient is show 3 months postcholecystectomy.  RUQ and epigastric discomfort on palpation but no peritoneal symptoms.  She denies N/V/D or constipation. She has an underlying IBS mixed but not symptomatic at this time. May have a postcholecystectomy syndrome.  Will check above labs  amylase lipase AST ALT creatinine bilirubin alk phos.  It occurs in 5 to 40% of patients after gallbladder removal.   Will order a KUB in office today to assess for any underlying constipation.  Due to postop pain we will go ahead and order CT abdomen pelvis.  She may need an ERCP for further evaluation depending on imaging results.    Will go ahead and refill patient's tramadol.  She does utilize this for orthopedic issues but it is helping with her abdominal pain mildly.  Advised to go to emergency department if any symptoms change or worsen                          Follow Up       No follow-ups on file.      Patient was given instructions and counseling regarding her condition or for health maintenance advice. Please see specific information pulled into the AVS if appropriate.     Brenda Mota, APRN1/12/202408:25 EST  This note has been electronically signed      Answers submitted by the patient for this visit:  Primary Reason for Visit (Submitted on 1/5/2024)  What is the primary reason for your visit?: Abdominal Pain

## 2024-01-12 NOTE — PROGRESS NOTES
Patient with some moderate constipation over colon.  This should not be causing right upper quadrant pain.  I have placed order for the CT scan.  Advised to go to ER at visit today if symptoms worsen.

## 2024-01-13 LAB
ALP SERPL-CCNC: 117 IU/L (ref 44–121)
ALT SERPL-CCNC: 17 IU/L (ref 0–32)
AMYLASE SERPL-CCNC: 67 U/L (ref 31–110)
AST SERPL-CCNC: 15 IU/L (ref 0–40)
BASOPHILS # BLD AUTO: 0.1 X10E3/UL (ref 0–0.2)
BASOPHILS NFR BLD AUTO: 1 %
BILIRUB SERPL-MCNC: 0.4 MG/DL (ref 0–1.2)
CREAT SERPL-MCNC: 0.98 MG/DL (ref 0.57–1)
EGFRCR SERPLBLD CKD-EPI 2021: 65 ML/MIN/1.73
EOSINOPHIL # BLD AUTO: 0.1 X10E3/UL (ref 0–0.4)
EOSINOPHIL NFR BLD AUTO: 1 %
ERYTHROCYTE [DISTWIDTH] IN BLOOD BY AUTOMATED COUNT: 12.6 % (ref 11.7–15.4)
HCT VFR BLD AUTO: 47.5 % (ref 34–46.6)
HGB BLD-MCNC: 15.9 G/DL (ref 11.1–15.9)
IMM GRANULOCYTES # BLD AUTO: 0 X10E3/UL (ref 0–0.1)
IMM GRANULOCYTES NFR BLD AUTO: 0 %
LDH SERPL L TO P-CCNC: 137 IU/L (ref 119–226)
LIPASE SERPL-CCNC: 41 U/L (ref 14–72)
LYMPHOCYTES # BLD AUTO: 3.3 X10E3/UL (ref 0.7–3.1)
LYMPHOCYTES NFR BLD AUTO: 43 %
MCH RBC QN AUTO: 28.8 PG (ref 26.6–33)
MCHC RBC AUTO-ENTMCNC: 33.5 G/DL (ref 31.5–35.7)
MCV RBC AUTO: 86 FL (ref 79–97)
MONOCYTES # BLD AUTO: 0.3 X10E3/UL (ref 0.1–0.9)
MONOCYTES NFR BLD AUTO: 4 %
NEUTROPHILS # BLD AUTO: 3.9 X10E3/UL (ref 1.4–7)
NEUTROPHILS NFR BLD AUTO: 51 %
PLATELET # BLD AUTO: 304 X10E3/UL (ref 150–450)
RBC # BLD AUTO: 5.53 X10E6/UL (ref 3.77–5.28)
URATE SERPL-MCNC: 4.5 MG/DL (ref 3–7.2)
WBC # BLD AUTO: 7.7 X10E3/UL (ref 3.4–10.8)

## 2024-01-15 DIAGNOSIS — H52.223 REGULAR ASTIGMATISM OF BOTH EYES: ICD-10-CM

## 2024-01-15 DIAGNOSIS — H52.13 BILATERAL MYOPIA: Primary | ICD-10-CM

## 2024-01-15 DIAGNOSIS — H52.4 BILATERAL PRESBYOPIA: ICD-10-CM

## 2024-01-15 DIAGNOSIS — E11.9 TYPE 2 DIABETES MELLITUS WITHOUT COMPLICATION, WITHOUT LONG-TERM CURRENT USE OF INSULIN: ICD-10-CM

## 2024-01-15 DIAGNOSIS — H25.9 AGE-RELATED CATARACT OF BOTH EYES, UNSPECIFIED AGE-RELATED CATARACT TYPE: ICD-10-CM

## 2024-01-15 RX ORDER — GLIMEPIRIDE 2 MG/1
2 TABLET ORAL
Qty: 90 TABLET | Refills: 1 | Status: SHIPPED | OUTPATIENT
Start: 2024-01-15

## 2024-01-29 ENCOUNTER — OFFICE VISIT (OUTPATIENT)
Dept: ORTHOPEDIC SURGERY | Facility: CLINIC | Age: 64
End: 2024-01-29
Payer: MEDICAID

## 2024-01-29 VITALS — HEIGHT: 66 IN | BODY MASS INDEX: 31.82 KG/M2 | HEART RATE: 98 BPM | WEIGHT: 198 LBS

## 2024-01-29 DIAGNOSIS — Z47.89 ORTHOPEDIC AFTERCARE: Primary | ICD-10-CM

## 2024-01-29 DIAGNOSIS — M19.012 OSTEOARTHRITIS OF LEFT GLENOHUMERAL JOINT: ICD-10-CM

## 2024-01-29 PROCEDURE — 99212 OFFICE O/P EST SF 10 MIN: CPT | Performed by: ORTHOPAEDIC SURGERY

## 2024-01-29 NOTE — PROGRESS NOTES
"     Patient ID: Judy Koehler is a 63 y.o. female.  2/15/23 left reverse total shoulder  No pain    Review of Systems:        Objective:    Pulse 98   Ht 167.6 cm (65.98\")   Wt 89.8 kg (198 lb)   BMI 31.98 kg/m²     Physical Examination:  Left shoulder no tenderness active elevation 170 abduction 140 external rotation 40 internal rotation L5    Imaging:   left Shoulder X-Ray  Indication: Postop total shoulder  AP Y and Lateral views  Findings: Reverse total shoulder in position  no bony lesion  Soft tissues normal  not examined joint spaces  Hardware appropriately positioned yes      yes prior studies available for comparison.    Assessment:    Doing well after shoulder arthroplasty    Plan:   Activity as tolerated see me as needed      Procedures          Disclaimer: Part of this note may be an electronic transcription/translation of spoken language to printed text using the Dragon Dictation System  " No

## 2024-02-08 ENCOUNTER — TELEPHONE (OUTPATIENT)
Dept: FAMILY MEDICINE CLINIC | Facility: CLINIC | Age: 64
End: 2024-02-08
Payer: MEDICAID

## 2024-02-08 DIAGNOSIS — K91.5 POST-CHOLECYSTECTOMY SYNDROME: Primary | ICD-10-CM

## 2024-02-08 NOTE — TELEPHONE ENCOUNTER
CT of abdomen shows no abnormalities or complications noted postsurgical cholecystectomy.  She does have colonic diverticulosis which would not cause the symptoms.  Additionally she is with a large pelvic lipoma -which is benign and would not be causing symptoms either.  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

## 2024-02-14 ENCOUNTER — TELEPHONE (OUTPATIENT)
Dept: FAMILY MEDICINE CLINIC | Facility: CLINIC | Age: 64
End: 2024-02-14
Payer: MEDICAID

## 2024-02-15 RX ORDER — CETIRIZINE HYDROCHLORIDE 10 MG/1
10 TABLET ORAL DAILY
Qty: 90 TABLET | Refills: 3 | Status: SHIPPED | OUTPATIENT
Start: 2024-02-15

## 2024-02-23 RX ORDER — LISINOPRIL 20 MG/1
TABLET ORAL
Qty: 90 TABLET | Refills: 3 | Status: SHIPPED | OUTPATIENT
Start: 2024-02-23

## 2024-02-26 ENCOUNTER — OFFICE VISIT (OUTPATIENT)
Dept: FAMILY MEDICINE CLINIC | Facility: CLINIC | Age: 64
End: 2024-02-26
Payer: MEDICAID

## 2024-02-26 VITALS
SYSTOLIC BLOOD PRESSURE: 126 MMHG | OXYGEN SATURATION: 100 % | DIASTOLIC BLOOD PRESSURE: 87 MMHG | BODY MASS INDEX: 31.82 KG/M2 | HEART RATE: 92 BPM | WEIGHT: 198 LBS | TEMPERATURE: 97.3 F | HEIGHT: 66 IN

## 2024-02-26 DIAGNOSIS — K91.5 POST-CHOLECYSTECTOMY SYNDROME: Primary | ICD-10-CM

## 2024-02-26 DIAGNOSIS — N28.1 RENAL CYST, ACQUIRED, LEFT: ICD-10-CM

## 2024-02-26 DIAGNOSIS — R10.12 LEFT UPPER QUADRANT ABDOMINAL PAIN: ICD-10-CM

## 2024-02-26 PROBLEM — Z23 NEED FOR INFLUENZA VACCINATION: Status: RESOLVED | Noted: 2023-12-12 | Resolved: 2024-02-26

## 2024-02-26 PROCEDURE — 3074F SYST BP LT 130 MM HG: CPT | Performed by: NURSE PRACTITIONER

## 2024-02-26 PROCEDURE — 99213 OFFICE O/P EST LOW 20 MIN: CPT | Performed by: NURSE PRACTITIONER

## 2024-02-26 PROCEDURE — 1159F MED LIST DOCD IN RCRD: CPT | Performed by: NURSE PRACTITIONER

## 2024-02-26 PROCEDURE — 3079F DIAST BP 80-89 MM HG: CPT | Performed by: NURSE PRACTITIONER

## 2024-02-26 PROCEDURE — 1160F RVW MEDS BY RX/DR IN RCRD: CPT | Performed by: NURSE PRACTITIONER

## 2024-02-26 PROCEDURE — T1015 CLINIC SERVICE: HCPCS | Performed by: NURSE PRACTITIONER

## 2024-02-26 NOTE — PROGRESS NOTES
"Chief Complaint  Abdominal Pain        Judy Koehler presents to [facility] INTER[facility] MEDIC[facility]        Subjective      63-year-old female patient presents today to follow-up on MRI results and abdominal pain.    2/20/2024 patient had abdominal MRI which was unremarkable aside from mild lipomatous infiltration and atrophy of the pancreas. She saw YANY Underwood who is ordering additional pancreas imaging.  Patient concerned about the cyst on her left kidney.  She is requesting additional imaging of this as well.  Patient does not cook much she eats out often.                  Objective         Vital Signs:     /87 (BP Location: Right arm, Patient Position: Sitting, Cuff Size: Adult)   Pulse 92   Temp 97.3 °F (36.3 °C) (Infrared)   Ht 167.6 cm (65.98\")   Wt 89.8 kg (198 lb)   SpO2 100%   BMI 31.97 kg/m²       Physical Exam  Vitals reviewed.   Constitutional:       Appearance: She is well-developed.      Comments:      HENT:      Head: Normocephalic and atraumatic.      Nose: Nose normal.      Mouth/Throat:      Mouth: Mucous membranes are moist.      Pharynx: Oropharynx is clear.   Eyes:      Conjunctiva/sclera: Conjunctivae normal.      Pupils: Pupils are equal, round, and reactive to light.   Cardiovascular:      Rate and Rhythm: Normal rate.   Pulmonary:      Effort: Pulmonary effort is normal. No respiratory distress.   Abdominal:      General: Bowel sounds are normal. There is no distension.      Palpations: Abdomen is soft. There is no mass.      Tenderness: There is abdominal tenderness in the right upper quadrant and left upper quadrant. There is no guarding or rebound.      Hernia: No hernia is present.   Musculoskeletal:         General: Normal range of motion.      Cervical back: Normal range of motion.   Skin:     General: Skin is warm and dry.      Findings: No rash.   Neurological:      Mental Status: She is alert and oriented to person, place, and time.   Psychiatric:    "      Behavior: Behavior normal.                History of Present Illness      Patient Active Problem List   Diagnosis    Gastroesophageal reflux disease    Hypertension    Migraine    Mixed hyperlipidemia    Plantar fasciitis of left foot    Rupture of hamstring tendon    Type 2 diabetes mellitus without complication, without long-term current use of insulin    Traumatic complete tear of left rotator cuff    Labral tear of long head of left biceps tendon    Tobacco abuse    Other insomnia    Irritable bowel syndrome with both constipation and diarrhea    DJD of left shoulder    S/P reverse total shoulder arthroplasty, left    Drug-induced constipation    Flank strain, initial encounter    Left upper quadrant abdominal pain    Symptomatic cholelithiasis    Right upper quadrant abdominal pain    Orthopedic aftercare    Medication refill    Status post cholecystectomy    Bilateral myopia    Regular astigmatism of both eyes    Age-related cataract of both eyes    Bilateral presbyopia    Post-cholecystectomy syndrome    Renal cyst, acquired, left         Past Medical History:   Diagnosis Date    Allergic     Arthritis     Cholelithiasis Sept 2023    Colon polyp 5years    Diabetes mellitus     GERD (gastroesophageal reflux disease)     Headache     Histoplasmosis     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    Hyperlipidemia     Hypertension     Irritable bowel syndrome     Low back pain     Murmur     Need for influenza vaccination     Obesity     Seasonal allergies           Family History   Problem Relation Age of Onset    Hypertension Mother     Heart disease Mother     Arthritis Mother     Cancer Mother     Thyroid disease Mother     Diabetes Father     Hyperlipidemia Father     No Known Problems Sister     No Known Problems Sister     No Known Problems Sister     No Known Problems Brother     No Known Problems Brother     No Known Problems Brother     No Known Problems Brother     No Known Problems Son           Past Surgical History:    Procedure Laterality Date     SECTION      CHOLECYSTECTOMY N/A 10/27/2023    Procedure: CHOLECYSTECTOMY LAPAROSCOPIC WITH DAVINCI ROBOT;  Surgeon: Mario Alexander MD;  Location: [facility] MAIN OR;  Service: Robotics - DaVinci;  Laterality: N/A;    COLONOSCOPY      JOINT REPLACEMENT  2023    Left shoulder    LUNG BIOPSY      SUBTOTAL HYSTERECTOMY      TONSILLECTOMY      TOTAL SHOULDER ARTHROPLASTY W/ DISTAL CLAVICLE EXCISION Left 2/15/2023    Procedure: TOTAL SHOULDER REVERSE ARTHROPLASTY;  Surgeon: Willian Gomez MD;  Location: [facility] MAIN OR;  Service: Orthopedics;  Laterality: Left;          Social History     Socioeconomic History    Marital status:    Tobacco Use    Smoking status: Every Day     Packs/day: 1.00     Years: 5.00     Additional pack years: 0.00     Total pack years: 5.00     Types: Cigarettes     Passive exposure: Current    Smokeless tobacco: Never   Vaping Use    Vaping Use: Never used   Substance and Sexual Activity    Alcohol use: Never    Drug use: Never    Sexual activity: Yes     Partners: Male     Birth control/protection: None                    Result Review :                                                  Assessment and Plan      Diagnoses and all orders for this visit:    1. Post-cholecystectomy syndrome (Primary)    2. Left upper quadrant abdominal pain    3. Renal cyst, acquired, left  -     US Renal Bilateral; Future      Patient MRI imaging shows fatty lipomas as well as atrophy of pancreas likely age-related.  Could result in chronic pancreatitis - recommend patient to follow up with gastroenterology. Discussed with patient dietary modifications. Patient should consume a high protein and lean meats as well as those low in animal fats and simple sugars.     Will order renal US as pt is concerned for cyst in left renal pole.  Likely benign but will go ahead so we can evaluate further.                     Follow Up       No follow-ups on  file.      Patient was given instructions and counseling regarding her condition or for health maintenance advice. Please see specific information pulled into the AVS if appropriate.     Brenda Mota, APRN2/26/202409:03 EST  This note has been electronically signed      Answers submitted by the patient for this visit:  Primary Reason for Visit (Submitted on 2/26/2024)  What is the primary reason for your visit?: Abdominal Pain

## 2024-03-08 ENCOUNTER — HOSPITAL ENCOUNTER (OUTPATIENT)
Dept: ULTRASOUND IMAGING | Facility: HOSPITAL | Age: 64
Discharge: HOME | End: 2024-03-08
Admitting: NURSE PRACTITIONER
Payer: COMMERCIAL

## 2024-03-08 DIAGNOSIS — N28.1 RENAL CYST, ACQUIRED, LEFT: ICD-10-CM

## 2024-03-08 PROCEDURE — 76775 US EXAM ABDO BACK WALL LIM: CPT

## 2024-03-27 NOTE — TELEPHONE ENCOUNTER
Caller: Judy Koehler    Relationship: Self    Best call back number:[phone]    Requested Prescriptions:   Requested Prescriptions     Pending Prescriptions Disp Refills    lisinopril (PRINIVIL,ZESTRIL) 20 MG tablet 90 tablet 3     Sig: Take 1 tablet by mouth Daily.    pantoprazole (PROTONIX) 40 MG EC tablet 90 tablet 1     Sig: Take 1 tablet by mouth Daily.        Pharmacy where request should be sent: [facility] [phone] [place], [place] [phone]      Last office visit with prescribing clinician: 2/26/2024   Last telemedicine visit with prescribing clinician: Visit date not found   Next office visit with prescribing clinician: Visit date not found     Does the patient have less than a 3 day supply:  [x] Yes  [] No    Would you like a call back once the refill request has been completed: [x] Yes [] No    If the office needs to give you a call back, can they leave a voicemail: [x] Yes [] No    Staci Phillips Rep   03/27/24 10:09 EDT

## 2024-03-28 RX ORDER — LISINOPRIL 20 MG/1
20 TABLET ORAL DAILY
Qty: 90 TABLET | Refills: 3 | Status: SHIPPED | OUTPATIENT
Start: 2024-03-28

## 2024-03-28 RX ORDER — PANTOPRAZOLE SODIUM 40 MG/1
40 TABLET, DELAYED RELEASE ORAL DAILY
Qty: 90 TABLET | Refills: 3 | Status: SHIPPED | OUTPATIENT
Start: 2024-03-28

## 2024-04-04 RX ORDER — ATORVASTATIN CALCIUM 20 MG/1
20 TABLET, FILM COATED ORAL DAILY
Qty: 90 TABLET | Refills: 3 | Status: SHIPPED | OUTPATIENT
Start: 2024-04-04

## 2024-04-25 RX ORDER — HYDROXYZINE 50 MG/1
TABLET, FILM COATED ORAL
Qty: 180 TABLET | Refills: 1 | Status: SHIPPED | OUTPATIENT
Start: 2024-04-25

## 2024-04-25 NOTE — TELEPHONE ENCOUNTER
Caller: Judy Koehler    Relationship: Self    Best call back number: [phone]     Requested Prescriptions:   Requested Prescriptions     Pending Prescriptions Disp Refills    hydrOXYzine (ATARAX) 50 MG tablet 60 tablet 2     Sig: TAKE 1/2 TO 2 TABLETS AS NEEDED FOR ANXIETY OR INSOMNIA        Pharmacy where request should be sent: [facility] [address] [place], [place] [phone] [place] [phone]      Last office visit with prescribing clinician: 2/26/2024   Last telemedicine visit with prescribing clinician: Visit date not found   Next office visit with prescribing clinician: Visit date not found     Additional details provided by patient: PATIENT IS OUT OF MEDICINE    Does the patient have less than a 3 day supply:  [x] Yes  [] No    Would you like a call back once the refill request has been completed: [x] Yes [] No    If the office needs to give you a call back, can they leave a voicemail: [x] Yes [] No    Staci Puri Rep   04/25/24 09:37 EDT

## 2024-05-09 ENCOUNTER — OFFICE (OUTPATIENT)
Dept: URBAN - METROPOLITAN AREA CLINIC 64 | Facility: CLINIC | Age: 64
End: 2024-05-09

## 2024-05-09 VITALS
HEART RATE: 91 BPM | SYSTOLIC BLOOD PRESSURE: 104 MMHG | WEIGHT: 206 LBS | HEIGHT: 66 IN | DIASTOLIC BLOOD PRESSURE: 67 MMHG

## 2024-05-09 DIAGNOSIS — R10.13 EPIGASTRIC PAIN: ICD-10-CM

## 2024-05-09 DIAGNOSIS — R10.12 LEFT UPPER QUADRANT PAIN: ICD-10-CM

## 2024-05-09 DIAGNOSIS — K59.00 CONSTIPATION, UNSPECIFIED: ICD-10-CM

## 2024-05-09 PROCEDURE — 99213 OFFICE O/P EST LOW 20 MIN: CPT | Performed by: NURSE PRACTITIONER

## 2024-05-09 RX ORDER — DICYCLOMINE HYDROCHLORIDE 20 MG/1
TABLET ORAL
Qty: 90 | Refills: 4 | Status: COMPLETED
End: 2024-05-09

## 2024-05-16 RX ORDER — GABAPENTIN 300 MG/1
300 CAPSULE ORAL 2 TIMES DAILY
COMMUNITY

## 2024-05-30 ENCOUNTER — ANESTHESIA EVENT (OUTPATIENT)
Dept: GASTROENTEROLOGY | Facility: HOSPITAL | Age: 64
End: 2024-05-30
Payer: COMMERCIAL

## 2024-05-30 ENCOUNTER — HOSPITAL ENCOUNTER (OUTPATIENT)
Facility: HOSPITAL | Age: 64
Setting detail: HOSPITAL OUTPATIENT SURGERY
Discharge: HOME | End: 2024-05-30
Attending: INTERNAL MEDICINE | Admitting: INTERNAL MEDICINE
Payer: COMMERCIAL

## 2024-05-30 ENCOUNTER — ON CAMPUS - OUTPATIENT (OUTPATIENT)
Dept: URBAN - METROPOLITAN AREA HOSPITAL 85 | Facility: HOSPITAL | Age: 64
End: 2024-05-30
Payer: MEDICARE

## 2024-05-30 ENCOUNTER — ANESTHESIA (OUTPATIENT)
Dept: GASTROENTEROLOGY | Facility: HOSPITAL | Age: 64
End: 2024-05-30
Payer: COMMERCIAL

## 2024-05-30 VITALS
TEMPERATURE: 98.9 F | OXYGEN SATURATION: 97 % | HEART RATE: 77 BPM | BODY MASS INDEX: 35.19 KG/M2 | SYSTOLIC BLOOD PRESSURE: 122 MMHG | HEIGHT: 65 IN | WEIGHT: 211.2 LBS | RESPIRATION RATE: 17 BRPM | DIASTOLIC BLOOD PRESSURE: 81 MMHG

## 2024-05-30 DIAGNOSIS — R93.89 ABNORMAL FINDING ON IMAGING: ICD-10-CM

## 2024-05-30 DIAGNOSIS — R10.12 LUQ ABDOMINAL PAIN: ICD-10-CM

## 2024-05-30 DIAGNOSIS — C25.9 MALIGNANT NEOPLASM OF PANCREAS, UNSPECIFIED: ICD-10-CM

## 2024-05-30 LAB — GLUCOSE BLDC GLUCOMTR-MCNC: 173 MG/DL (ref 70–105)

## 2024-05-30 PROCEDURE — 25010000002 PROPOFOL 10 MG/ML EMULSION: Performed by: ANESTHESIOLOGIST ASSISTANT

## 2024-05-30 PROCEDURE — 88177 CYTP FNA EVAL EA ADDL: CPT | Performed by: INTERNAL MEDICINE

## 2024-05-30 PROCEDURE — 25010000002 CEFTRIAXONE PER 250 MG: Performed by: ANESTHESIOLOGIST ASSISTANT

## 2024-05-30 PROCEDURE — 88172 CYTP DX EVAL FNA 1ST EA SITE: CPT | Performed by: INTERNAL MEDICINE

## 2024-05-30 PROCEDURE — 43242 EGD US FINE NEEDLE BX/ASPIR: CPT | Performed by: INTERNAL MEDICINE

## 2024-05-30 PROCEDURE — 25010000002 METRONIDAZOLE 500 MG/100ML SOLUTION: Performed by: ANESTHESIOLOGIST ASSISTANT

## 2024-05-30 PROCEDURE — 88305 TISSUE EXAM BY PATHOLOGIST: CPT | Performed by: INTERNAL MEDICINE

## 2024-05-30 PROCEDURE — 25810000003 SODIUM CHLORIDE 0.9 % SOLUTION: Performed by: ANESTHESIOLOGIST ASSISTANT

## 2024-05-30 PROCEDURE — 88360 TUMOR IMMUNOHISTOCHEM/MANUAL: CPT | Performed by: INTERNAL MEDICINE

## 2024-05-30 PROCEDURE — 25810000003 SODIUM CHLORIDE 0.9 % SOLUTION: Performed by: INTERNAL MEDICINE

## 2024-05-30 PROCEDURE — 82948 REAGENT STRIP/BLOOD GLUCOSE: CPT

## 2024-05-30 PROCEDURE — 88342 IMHCHEM/IMCYTCHM 1ST ANTB: CPT | Performed by: INTERNAL MEDICINE

## 2024-05-30 PROCEDURE — 88341 IMHCHEM/IMCYTCHM EA ADD ANTB: CPT | Performed by: INTERNAL MEDICINE

## 2024-05-30 RX ORDER — CEFTRIAXONE 2 G/1
INJECTION, POWDER, FOR SOLUTION INTRAMUSCULAR; INTRAVENOUS AS NEEDED
Status: DISCONTINUED | OUTPATIENT
Start: 2024-05-30 | End: 2024-05-30 | Stop reason: SURG

## 2024-05-30 RX ORDER — SODIUM CHLORIDE 9 MG/ML
9 INJECTION, SOLUTION INTRAVENOUS ONCE
Status: COMPLETED | OUTPATIENT
Start: 2024-05-30 | End: 2024-05-30

## 2024-05-30 RX ORDER — METRONIDAZOLE 500 MG/100ML
INJECTION, SOLUTION INTRAVENOUS AS NEEDED
Status: DISCONTINUED | OUTPATIENT
Start: 2024-05-30 | End: 2024-05-30 | Stop reason: SURG

## 2024-05-30 RX ORDER — SODIUM CHLORIDE 9 MG/ML
INJECTION, SOLUTION INTRAVENOUS CONTINUOUS PRN
Status: DISCONTINUED | OUTPATIENT
Start: 2024-05-30 | End: 2024-05-30 | Stop reason: SURG

## 2024-05-30 RX ADMIN — METRONIDAZOLE 500 MG: 500 INJECTION, SOLUTION INTRAVENOUS at 08:41

## 2024-05-30 RX ADMIN — SODIUM CHLORIDE 9 ML/HR: 9 INJECTION, SOLUTION INTRAVENOUS at 08:24

## 2024-05-30 RX ADMIN — PROPOFOL 100 MCG/KG/MIN: 10 INJECTION, EMULSION INTRAVENOUS at 08:37

## 2024-05-30 RX ADMIN — SODIUM CHLORIDE: 9 INJECTION, SOLUTION INTRAVENOUS at 08:37

## 2024-05-30 RX ADMIN — CEFTRIAXONE SODIUM 2 G: 2 INJECTION, POWDER, FOR SOLUTION INTRAMUSCULAR; INTRAVENOUS at 08:41

## 2024-05-30 NOTE — ANESTHESIA POSTPROCEDURE EVALUATION
Patient: Judy Koehler    Procedure Summary       Date: 05/30/24 Room / Location: [facility] ENDOSCOPY 2 / [facility] ENDOSCOPY    Anesthesia Start: 0837 Anesthesia Stop: 0907    Procedure: ENDOSCOPIC ULTRASOUND WITH FINE NEEDLE ASPIRATION Diagnosis:       LUQ abdominal pain      Abnormal finding on imaging      (LUQ abdominal pain [R10.12])      (Abnormal finding on imaging [R93.89])    Surgeons: Arleth Blood MD Provider: Christi Rai MD    Anesthesia Type: general ASA Status: 3            Anesthesia Type: general    Vitals  Vitals Value Taken Time   /70 05/30/24 0942   Temp     Pulse 78 05/30/24 0947   Resp 17 05/30/24 0935   SpO2 99 % 05/30/24 0947   Vitals shown include unfiled device data.        Post Anesthesia Care and Evaluation    Patient location during evaluation: PACU  Patient participation: complete - patient participated  Level of consciousness: awake and alert  Pain management: satisfactory to patient    Airway patency: patent  Anesthetic complications: No anesthetic complications  PONV Status: none  Cardiovascular status: acceptable  Respiratory status: acceptable  Hydration status: acceptable

## 2024-05-30 NOTE — OP NOTE
ESOPHAGOGASTRODUODENOSCOPY WITH ULTRASOUND AND FINE NEEDLE ASPIRATION Procedure Report    Patient Name:  Judy Koehler  YOB: 1960    Date of Surgery:  5/30/2024     Pre-Op Diagnosis:  LUQ abdominal pain [R10.12]  Abnormal finding on imaging [R93.89]       Post-Op Diagnosis Codes:     * LUQ abdominal pain [R10.12]     * Abnormal finding on imaging [R93.89]      Procedure/CPT® Codes:      Procedure(s):  ENDOSCOPIC ULTRASOUND WITH FINE NEEDLE ASPIRATION    Staff:  Surgeon(s):  Arleth Blood MD      Anesthesia: Monitored Anesthesia Care    Description of Procedure:  A description of the procedure as well as risks, benefits and alternative methods were explained to the patient who voiced understanding and signed the corresponding consent form. A physical exam was performed and vital signs were monitored throughout the procedure.    Initially Pentax radial scope was advanced under direct realization from oropharynx, esophagus stomach and the second part duodenum.  Limited evaluation of the esophageal gastric antral mucosa looks normal.    Scanning of the body and the tail of the pancreas showed fatty infiltration of the body and the tail of the pancreas.  There was a hypodense round mass noticed in the body of the pancreas close to 10 mm 1.1 cm.x this looks suspicious for possible neuroendocrine tumor.  This is not attached to the pancreatic duct.  PD was barely visible without any obvious lesion around the PD.  Pancreas duct was not dilated.  Minimal lobulation was noticed in the pancreas body tail area with few hyperechoic bright foci.  Scanning the head of the pancreas also showed few hyperechoic bright foci with minimal lobulation Common bile duct was 4.5 mm.  PD was almost 2 mm.    Subsequently lean array scope was advanced, hypodense lesion at the body of the pancreas the brought into the picture it measures 1X 1.1 cm.  3 passes were performed using 22 and 25-gauge needle initial cytology did show  atypical around uniform cell possibly suggestive of neuroendocrine tumor further passes were saved for staining and immunochemistry for final identification.  Small hiatal he was noticed with limited evaluation of the gastroduodenal mucosa looks normal.  Patient Toller procedure very well.    Impression:  1.  Fatty infiltration of the pancreas body and the tail with a mild changes of chronic pancreatitis.  2.  Pancreatic body mass with hypodense lesion measuring 1x1.1 cm, this is round hypodense lesion highly concerning for possibility of a neuroendocrine tumor, at least 3 passes were performed with initial cytology showing a uniform cells, further passes were saved for cell block and immunostains.  3.  Small hiatal hernia otherwise limited normal gastric and duodenal mucosa    Recommendations:  Patient to follow the GI clinic as scheduled.  Follow-up with the biopsy result.  Continue the PPI.  Continue the gabapentin which has helped improve some of her symptoms.  Resume other preop medication        Arleth Blood MD     Date: 5/30/2024    Time: 09:11 EDT

## 2024-05-30 NOTE — DISCHARGE INSTRUCTIONS
A responsible adult should stay with you and you should rest quietly for the rest of the day.    Do not drink alcohol, drive, operate any heavy machinery or power tools or make any legal/important decisions for the next 24 hours.     Progress your diet as tolerated.  If you begin to experience severe pain, increased shortness of breath, racing heartbeat or a fever above 101 F, seek immediate medical attention.     Follow up with MD as instructed. Call office for results in 3 to 5 days if needed.     For further questions please call Dr. Blood's office at [phone]    Recommendations:  Patient to follow the GI clinic as scheduled.  Follow-up with the biopsy result.  Continue the PPI.  Continue the gabapentin which has helped improve some of her symptoms.  Resume other preop medication

## 2024-05-30 NOTE — H&P
Patient Care Team:  Brenda Mota APRN as PCP - General (Nurse Practitioner)  Mario Alexander MD as Consulting Physician (General Surgery)      Subjective .     History of present illness:    Judy Koehler is a 63 y.o. female who presents today for Procedure(s):  ENDOSCOPIC ULTRASOUND WITH FINE NEEDLE ASPIRATION for the indications listed below.     The updated Patient Profile was reviewed prior to the procedure, in conjunction with the Physical Exam, including medical conditions, surgical procedures, medications, allergies, family history and social history.     Pre-operatively, I reviewed the indication(s) for the procedure, the risks of the procedure [including but not limited to: unexpected bleeding possibly requiring hospitalization and/or unplanned repeat procedures, perforation possibly requiring surgical treatment, missed lesions and complications of sedation/MAC (also explained by anesthesia staff)].     I have evaluated the patient for risks associated with the planned anesthesia and the procedure to be performed and find the patient an acceptable candidate for IV sedation.    Multiple opportunities were provided for any questions or concerns, and all questions were answered satisfactorily before any anesthesia was administered. We will proceed with the planned procedure.      ASSESSMENT/PLAN:  63 years old female with abdominal pain.  EUS      Past Medical History:  Past Medical History:   Diagnosis Date    Allergic     Arthritis     Cholelithiasis 2023    Colon polyp 5years    Diabetes mellitus     GERD (gastroesophageal reflux disease)     Headache     Histoplasmosis     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    Hyperlipidemia     Hypertension     Irritable bowel syndrome     Low back pain     Murmur     Need for influenza vaccination     Obesity     Seasonal allergies        Past Surgical History:  Past Surgical History:   Procedure Laterality Date     SECTION      CHOLECYSTECTOMY N/A 10/27/2023     Procedure: CHOLECYSTECTOMY LAPAROSCOPIC WITH DAVINCI ROBOT;  Surgeon: Mario Alexander MD;  Location: [facility] MAIN OR;  Service: Robotics - DaVinci;  Laterality: N/A;    COLONOSCOPY      JOINT REPLACEMENT  Feb 2023    Left shoulder    LUNG BIOPSY      SUBTOTAL HYSTERECTOMY      TONSILLECTOMY      TOTAL SHOULDER ARTHROPLASTY W/ DISTAL CLAVICLE EXCISION Left 2/15/2023    Procedure: TOTAL SHOULDER REVERSE ARTHROPLASTY;  Surgeon: Willian Gomez MD;  Location: [facility] MAIN OR;  Service: Orthopedics;  Laterality: Left;       Social History:  Social History     Tobacco Use    Smoking status: Every Day     Current packs/day: 1.00     Average packs/day: 1 pack/day for 5.0 years (5.0 ttl pk-yrs)     Types: Cigarettes     Passive exposure: Current    Smokeless tobacco: Never   Vaping Use    Vaping status: Never Used   Substance Use Topics    Alcohol use: Never    Drug use: Never       Family History:  Family History   Problem Relation Age of Onset    Hypertension Mother     Heart disease Mother     Arthritis Mother     Cancer Mother     Thyroid disease Mother     Diabetes Father     Hyperlipidemia Father     No Known Problems Sister     No Known Problems Sister     No Known Problems Sister     No Known Problems Brother     No Known Problems Brother     No Known Problems Brother     No Known Problems Brother     No Known Problems Son        Medications:  Medications Prior to Admission   Medication Sig Dispense Refill Last Dose    acetaminophen (TYLENOL) 325 MG tablet Take 2 tablets by mouth Every 6 (Six) Hours As Needed for Mild Pain.   Past Month    atorvastatin (LIPITOR) 20 MG tablet Take 1 tablet by mouth once daily 90 tablet 3 5/29/2024    Blood Glucose Monitoring Suppl (Blood Glucose Monitor System) w/Device kit Check blood sugars tid 1 each 0 5/29/2024    cetirizine (zyrTEC) 10 MG tablet Take 1 tablet by mouth Daily. 90 tablet 3 5/29/2024    Cyanocobalamin (B-12) 1000 MCG capsule Take  by mouth.   Past Month     cyclobenzaprine (FLEXERIL) 10 MG tablet TAKE 1 TABLET BY MOUTH 3 TIMES A DAY AS NEEDED FOR MUSCLE SPASMS. 90 tablet 1 5/29/2024    diphenhydrAMINE HCl (BENADRYL ALLERGY PO) Take  by mouth.   Past Month    gabapentin (NEURONTIN) 300 MG capsule Take 1 capsule by mouth 2 (Two) Times a Day.   5/29/2024    glimepiride (Amaryl) 2 MG tablet Take 1 tablet by mouth Every Morning Before Breakfast. 90 tablet 1 5/29/2024    glucose blood test strip 1 each by Other route 3 times a day. Use as instructed 50 each 11 5/29/2024    hydrOXYzine (ATARAX) 50 MG tablet TAKE 1/2 TO 2 TABLETS AS NEEDED FOR ANXIETY OR INSOMNIA 180 tablet 1 5/28/2024    lisinopril (PRINIVIL,ZESTRIL) 20 MG tablet Take 1 tablet by mouth Daily. 90 tablet 3 5/29/2024    ondansetron (Zofran) 4 MG tablet Take 1 tablet by mouth Daily As Needed for Nausea or Vomiting. 30 tablet 1 5/16/2024    pantoprazole (PROTONIX) 40 MG EC tablet Take 1 tablet by mouth Daily. 90 tablet 3 5/29/2024    polyethylene glycol (MIRALAX) 17 g packet Take 17 g by mouth Daily. 14 each 0 5/27/2024    promethazine (PHENERGAN) 12.5 MG tablet 1-2 q6h prn 60 tablet 2 5/16/2024    Semaglutide, 1 MG/DOSE, (OZEMPIC) 2 MG/1.5ML solution pen-injector Inject 1 mg under the skin into the appropriate area as directed 1 (One) Time Per Week. 3 mL 0 5/16/2024    traMADol (ULTRAM) 50 MG tablet Take 1 tablet by mouth Every 8 (Eight) Hours As Needed for Moderate Pain. 120 tablet 0 5/16/2024    Triamcinolone Acetonide (Nasacort Allergy 24HR) 55 MCG/ACT nasal inhaler 2 sprays into the nostril(s) as directed by provider Daily. 16.5 g 2 5/16/2024    vitamin D (ERGOCALCIFEROL) 1.25 MG (93078 UT) capsule capsule TAKE 1 CAPSULE BY MOUTH 1 TIME PER WEEK. 12 capsule 1 5/23/2024    B-D UF III MINI PEN NEEDLES 31G X 5 MM misc USE AS DIRECTED 100 each 6 5/16/2024    dicyclomine (BENTYL) 20 MG tablet Take 1 tablet by mouth 3 (Three) Times a Day.       naloxone (NARCAN) 4 MG/0.1ML nasal spray 1 spray into the nostril(s) as  directed by provider As Needed (per overdose). 2 each 2 Unknown       Scheduled Meds:  Continuous Infusions:No current facility-administered medications for this encounter.    PRN Meds:.    ALLERGIES:  Patient has no known allergies.        Objective     Vital Signs:   Temp:  [98.9 °F (37.2 °C)] 98.9 °F (37.2 °C)  Heart Rate:  [81] 81  Resp:  [16] 16  BP: (122)/(71) 122/71    Physical Exam:      General Appearance:    Awake and alert, in no acute distress   Lungs:     Clear to auscultation bilaterally, respirations regular, even and unlabored    Heart:    Regular rhythm and normal rate, normal S1 and S2, no            murmur, no gallop, no rub   Abdomen:     Normal bowel sounds, soft, non-tender, no rebound or guarding, non-distended, no hepatosplenomegaly        Results Review:   I reviewed the patient's labs and imaging.  Lab Results (last 24 hours)       Procedure Component Value Units Date/Time    POC Glucose Once [[id number]]  (Abnormal) Collected: 05/30/24 0754    Specimen: Blood Updated: 05/30/24 0756     Glucose 173 mg/dL      Comment: Serial Number: [id number]:  [id number]               Imaging Results (Last 24 Hours)       ** No results found for the last 24 hours. **               I discussed the patients findings and my recommendations with the patient.  Arleth Blood MD  05/30/24  09:19 EDT

## 2024-05-30 NOTE — ANESTHESIA PREPROCEDURE EVALUATION
Anesthesia Evaluation     Patient summary reviewed   no history of anesthetic complications:   NPO Solid Status: > 8 hours  NPO Liquid Status: > 8 hours           Airway   Mallampati: II  TM distance: >3 FB  Neck ROM: full  Dental - normal exam     Pulmonary - normal exam   (+) a smoker Current,  Cardiovascular - normal exam    (+) hypertension well controlled, valvular problems/murmurs, hyperlipidemia      Neuro/Psych  (+) headaches  GI/Hepatic/Renal/Endo    (+) obesity, GERD well controlled, diabetes mellitus type 2    Musculoskeletal     Abdominal  - normal exam    Bowel sounds: normal.   Substance History      OB/GYN          Other   arthritis,                 Anesthesia Plan    ASA 3     general   total IV anesthesia  intravenous induction     Anesthetic plan, risks, benefits, and alternatives have been provided, discussed and informed consent has been obtained with: patient.  Pre-procedure education provided  Plan discussed with CRNA and CAA.    CODE STATUS:

## 2024-05-31 NOTE — NURSING NOTE
Patient Judy Koehler report right sided abdominal pain on postop phone call. Passing air on own. Advised her to call if symptoms got worse and not better or if symptoms not relieved with time. Dr. Blood notified.

## 2024-06-03 LAB
BEAKER LAB AP INTRAOPERATIVE CONSULTATION: NORMAL
LAB AP CASE REPORT: NORMAL
LAB AP DIAGNOSIS COMMENT: NORMAL
LAB AP FLOW CYTOMETRY REPORT,ADDENDUM: NORMAL
PATH REPORT.FINAL DX SPEC: NORMAL
PATH REPORT.GROSS SPEC: NORMAL

## 2024-06-10 ENCOUNTER — OFFICE VISIT (OUTPATIENT)
Dept: FAMILY MEDICINE CLINIC | Facility: CLINIC | Age: 64
End: 2024-06-10
Payer: COMMERCIAL

## 2024-06-10 VITALS
HEART RATE: 96 BPM | WEIGHT: 210 LBS | OXYGEN SATURATION: 96 % | HEIGHT: 65 IN | DIASTOLIC BLOOD PRESSURE: 72 MMHG | SYSTOLIC BLOOD PRESSURE: 110 MMHG | BODY MASS INDEX: 34.99 KG/M2 | TEMPERATURE: 97.3 F

## 2024-06-10 DIAGNOSIS — K86.89 PANCREATIC MASS: ICD-10-CM

## 2024-06-10 DIAGNOSIS — R07.81 RIB PAIN: Primary | ICD-10-CM

## 2024-06-10 DIAGNOSIS — Z72.0 TOBACCO ABUSE: ICD-10-CM

## 2024-06-10 PROCEDURE — 99213 OFFICE O/P EST LOW 20 MIN: CPT | Performed by: NURSE PRACTITIONER

## 2024-06-10 NOTE — PATIENT INSTRUCTIONS
Chest x-ray  800 ibuprofen 3 times daily in splint using a pillow  Find out if you have referral to oncology and general surgery yet  Follow-up 3 to 6 months

## 2024-06-10 NOTE — PROGRESS NOTES
Judy Koehler is a 63 y.o. female.     History of Present Illness  63-year-old white female smoker with history of migraine headaches, GERD, partial hysterectomy, type 2 diabetes, hyperlipidemia, insomnia, IBS, and new diagnosis of pancreatic cancer who comes in today after falling down complaining of left rib pain    Blood pressure 110/72 heart rate 96 she denies any chest pain, dyspnea, tachycardia or dizziness    Patient was trying to separate a dog fight end up getting knocked down and is having a lot of pain left front rib area under left breast.  She is wants to get a chest x-ray to see if it is broken or bruised.  I did advise her to take NSAIDs in the splint with pillow when coughing or sneezing and told her the pain will get better in 1 to 2 weeks    Patient is supposed to be scheduled to see general surgery and oncology for her pancreatic mass but she has not heard anything yet I advised her that she can be seen here in [place] if she does not want to drive to [place] and she will call and find out who they are going to refer her.  This is a neuroendocrine tumor grade one 1 x 1.1 cm in size    Weight is 210 with a BMI of 34.9.  She has had 4 COVID vaccines up-to-date on eye exam.  Her mammogram is due but she wants to wait till she gets through all this first.  Her DEXA scan is due next April.  Her next colonoscopy was due in June 2028 but I suspect there may be doing a PET scan.  I strongly advised patient to stop smoking          Chest x-ray  800 ibuprofen 3 times daily in splint using a pillow  Find out if you have referral to oncology and general surgery yet  About smoking  Follow-up 3 to 6 months       The following portions of the patient's history were reviewed and updated as appropriate: allergies, current medications, past family history, past medical history, past social history, past surgical history, and problem list.    Vitals:    06/10/24 1119   BP: 110/72   BP Location: Right arm  "  Patient Position: Sitting   Cuff Size: Adult   Pulse: 96   Temp: 97.3 °F (36.3 °C)   TempSrc: Infrared   SpO2: 96%   Weight: 95.3 kg (210 lb)   Height: 165.1 cm (65\")     Body mass index is 34.95 kg/m².          Past Medical History:   Diagnosis Date    Allergic     Arthritis     Cholelithiasis 2023    Colon polyp 5years    Diabetes mellitus     GERD (gastroesophageal reflux disease)     Headache     Histoplasmosis     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    Hyperlipidemia     Hypertension     Irritable bowel syndrome     Low back pain     Murmur     Need for influenza vaccination     Obesity     Seasonal allergies      Past Surgical History:   Procedure Laterality Date     SECTION      CHOLECYSTECTOMY N/A 10/27/2023    Procedure: CHOLECYSTECTOMY LAPAROSCOPIC WITH DAVINCI ROBOT;  Surgeon: Mario Alexander MD;  Location: [facility] MAIN OR;  Service: Robotics - DaVinci;  Laterality: N/A;    COLONOSCOPY      JOINT REPLACEMENT  2023    Left shoulder    LUNG BIOPSY      SUBTOTAL HYSTERECTOMY      TONSILLECTOMY      TOTAL SHOULDER ARTHROPLASTY W/ DISTAL CLAVICLE EXCISION Left 2/15/2023    Procedure: TOTAL SHOULDER REVERSE ARTHROPLASTY;  Surgeon: Willian Gomez MD;  Location: [facility] MAIN OR;  Service: Orthopedics;  Laterality: Left;    UPPER ENDOSCOPIC ULTRASOUND W/ FNA N/A 2024    Procedure: ENDOSCOPIC ULTRASOUND WITH FINE NEEDLE ASPIRATION;  Surgeon: Arleth Blood MD;  Location: [facility] ENDOSCOPY;  Service: Gastroenterology;  Laterality: N/A;  post: pancreatic body mass and hiatal hernia     Family History   Problem Relation Age of Onset    Hypertension Mother     Heart disease Mother     Arthritis Mother     Cancer Mother     Thyroid disease Mother     Diabetes Father     Hyperlipidemia Father     No Known Problems Sister     No Known Problems Sister     No Known Problems Sister     No Known Problems Brother     No Known Problems Brother     No Known Problems Brother     No Known Problems Brother     No Known " Problems Son      Immunization History   Administered Date(s) Administered    COVID-19 (PFIZER) Purple Cap Monovalent 01/08/2021, 01/29/2021    Covid-19 (Pfizer) Gray Cap Monovalent 04/06/2022    Fluzone (or Fluarix & Flulaval for VFC) >6mos 10/11/2017, 09/21/2018, 09/27/2019, 10/02/2020, 10/13/2021, 11/23/2022, 12/12/2023    Influenza, Unspecified 09/27/2019, 10/02/2020, 10/13/2021    Pneumococcal Polysaccharide (PPSV23) 12/12/2017    Tdap 07/20/2012, 04/19/2013       Admission on 05/30/2024, Discharged on 05/30/2024   Component Date Value Ref Range Status    Glucose 05/30/2024 173 (H)  70 - 105 mg/dL Final    Serial Number: [id number]:  [id number]    Case Report 05/30/2024    Final                    Value:Medical Cytology Report                           Case: [id number]                                  Authorizing Provider:  Arleth Blood MD          Collected:           05/30/2024 08:50 AM          Ordering Location:     [facility]  Received:            05/30/2024 01:10 PM                                 SUITES                                                                       Pathologist:           Lyle Shen MD                                                             Specimen:    Pancreas, pancreatic body fna                                                              Flow Cytometry Report, Addendum 05/30/2024    Final                    Value:This result contains rich text formatting which cannot be displayed here.    Final Diagnosis 05/30/2024    Final                    Value:This result contains rich text formatting which cannot be displayed here.    Comment 05/30/2024    Final                    Value:This result contains rich text formatting which cannot be displayed here.    Gross Description 05/30/2024    Final                    Value:This result contains rich text formatting which cannot be displayed here.    Intraoperative Consultation 05/30/2024    Final                     Value:This result contains rich text formatting which cannot be displayed here.         Review of Systems   Constitutional: Negative.    HENT: Negative.     Respiratory: Negative.     Cardiovascular:  Positive for chest pain.   Gastrointestinal:  Positive for abdominal pain.   Genitourinary: Negative.    Musculoskeletal: Negative.    Skin: Negative.    Neurological: Negative.    Psychiatric/Behavioral: Negative.         Objective   Physical Exam  Constitutional:       Appearance: Normal appearance.   HENT:      Head: Normocephalic.   Cardiovascular:      Rate and Rhythm: Normal rate and regular rhythm.      Pulses: Normal pulses.      Heart sounds: Normal heart sounds.   Pulmonary:      Effort: Pulmonary effort is normal.      Breath sounds: Normal breath sounds.   Abdominal:      General: Bowel sounds are normal.   Musculoskeletal:      Comments: Rib pain under her left breast with deep breaths sneezing etc.  Still having some right-sided abdominal pain due to pancreatic mass   Skin:     General: Skin is warm.   Neurological:      General: No focal deficit present.      Mental Status: She is alert and oriented to person, place, and time.   Psychiatric:         Mood and Affect: Mood normal.         Behavior: Behavior normal.         Procedures    Assessment & Plan   Diagnoses and all orders for this visit:    1. Rib pain (Primary)  -     XR Chest 2 View; Future    2. Tobacco abuse    3. Pancreatic mass           Current Outpatient Medications:     acetaminophen (TYLENOL) 325 MG tablet, Take 2 tablets by mouth Every 6 (Six) Hours As Needed for Mild Pain., Disp: , Rfl:     atorvastatin (LIPITOR) 20 MG tablet, Take 1 tablet by mouth once daily, Disp: 90 tablet, Rfl: 3    B-D UF III MINI PEN NEEDLES 31G X 5 MM misc, USE AS DIRECTED, Disp: 100 each, Rfl: 6    Blood Glucose Monitoring Suppl (Blood Glucose Monitor System) w/Device kit, Check blood sugars tid, Disp: 1 each, Rfl: 0    cetirizine (zyrTEC) 10 MG tablet,  Take 1 tablet by mouth Daily., Disp: 90 tablet, Rfl: 3    Cyanocobalamin (B-12) 1000 MCG capsule, Take  by mouth., Disp: , Rfl:     cyclobenzaprine (FLEXERIL) 10 MG tablet, TAKE 1 TABLET BY MOUTH 3 TIMES A DAY AS NEEDED FOR MUSCLE SPASMS., Disp: 90 tablet, Rfl: 1    dicyclomine (BENTYL) 20 MG tablet, Take 1 tablet by mouth 3 (Three) Times a Day., Disp: , Rfl:     diphenhydrAMINE HCl (BENADRYL ALLERGY PO), Take  by mouth., Disp: , Rfl:     gabapentin (NEURONTIN) 300 MG capsule, Take 1 capsule by mouth 2 (Two) Times a Day., Disp: , Rfl:     glimepiride (Amaryl) 2 MG tablet, Take 1 tablet by mouth Every Morning Before Breakfast., Disp: 90 tablet, Rfl: 1    glucose blood test strip, 1 each by Other route 3 times a day. Use as instructed, Disp: 50 each, Rfl: 11    hydrOXYzine (ATARAX) 50 MG tablet, TAKE 1/2 TO 2 TABLETS AS NEEDED FOR ANXIETY OR INSOMNIA, Disp: 180 tablet, Rfl: 1    lisinopril (PRINIVIL,ZESTRIL) 20 MG tablet, Take 1 tablet by mouth Daily., Disp: 90 tablet, Rfl: 3    naloxone (NARCAN) 4 MG/0.1ML nasal spray, 1 spray into the nostril(s) as directed by provider As Needed (per overdose)., Disp: 2 each, Rfl: 2    ondansetron (Zofran) 4 MG tablet, Take 1 tablet by mouth Daily As Needed for Nausea or Vomiting., Disp: 30 tablet, Rfl: 1    pantoprazole (PROTONIX) 40 MG EC tablet, Take 1 tablet by mouth Daily., Disp: 90 tablet, Rfl: 3    polyethylene glycol (MIRALAX) 17 g packet, Take 17 g by mouth Daily., Disp: 14 each, Rfl: 0    promethazine (PHENERGAN) 12.5 MG tablet, 1-2 q6h prn, Disp: 60 tablet, Rfl: 2    Semaglutide, 1 MG/DOSE, (OZEMPIC) 2 MG/1.5ML solution pen-injector, Inject 1 mg under the skin into the appropriate area as directed 1 (One) Time Per Week., Disp: 3 mL, Rfl: 0    traMADol (ULTRAM) 50 MG tablet, Take 1 tablet by mouth Every 8 (Eight) Hours As Needed for Moderate Pain., Disp: 120 tablet, Rfl: 0    Triamcinolone Acetonide (Nasacort Allergy 24HR) 55 MCG/ACT nasal inhaler, 2 sprays into the  nostril(s) as directed by provider Daily., Disp: 16.5 g, Rfl: 2    vitamin D (ERGOCALCIFEROL) 1.25 MG (71032 UT) capsule capsule, TAKE 1 CAPSULE BY MOUTH 1 TIME PER WEEK., Disp: 12 capsule, Rfl: 1           Debbie Phillips, YANY 6/10/2024 12:26 EDT  This note has been electronically signed

## 2024-06-18 RX ORDER — SEMAGLUTIDE 2.68 MG/ML
INJECTION, SOLUTION SUBCUTANEOUS
Qty: 3 ML | Refills: 2 | Status: SHIPPED | OUTPATIENT
Start: 2024-06-18

## 2024-06-20 RX ORDER — HYDROXYZINE 50 MG/1
TABLET, FILM COATED ORAL
Qty: 180 TABLET | Refills: 0 | Status: SHIPPED | OUTPATIENT
Start: 2024-06-20

## 2024-06-21 ENCOUNTER — TRANSCRIBE ORDERS (OUTPATIENT)
Dept: ADMINISTRATIVE | Facility: HOSPITAL | Age: 64
End: 2024-06-21
Payer: COMMERCIAL

## 2024-06-21 DIAGNOSIS — D49.0 PANCREATIC NEOPLASM: Primary | ICD-10-CM

## 2024-07-10 ENCOUNTER — HOSPITAL ENCOUNTER (OUTPATIENT)
Dept: MRI IMAGING | Facility: HOSPITAL | Age: 64
Discharge: HOME | End: 2024-07-10
Admitting: SURGERY
Payer: COMMERCIAL

## 2024-07-10 DIAGNOSIS — D49.0 PANCREATIC NEOPLASM: ICD-10-CM

## 2024-07-10 PROCEDURE — A9579 GAD-BASE MR CONTRAST NOS,1ML: HCPCS | Performed by: SURGERY

## 2024-07-10 PROCEDURE — 74183 MRI ABD W/O CNTR FLWD CNTR: CPT

## 2024-07-10 PROCEDURE — 25010000002 GADOTERIDOL PER 1 ML: Performed by: SURGERY

## 2024-07-10 RX ADMIN — GADOTERIDOL 20 ML: 279.3 INJECTION, SOLUTION INTRAVENOUS at 18:04

## 2024-07-19 ENCOUNTER — OFFICE VISIT (OUTPATIENT)
Dept: FAMILY MEDICINE CLINIC | Facility: CLINIC | Age: 64
End: 2024-07-19
Payer: COMMERCIAL

## 2024-07-19 VITALS
HEART RATE: 96 BPM | HEIGHT: 65 IN | DIASTOLIC BLOOD PRESSURE: 76 MMHG | WEIGHT: 205 LBS | OXYGEN SATURATION: 100 % | BODY MASS INDEX: 34.16 KG/M2 | TEMPERATURE: 97 F | SYSTOLIC BLOOD PRESSURE: 119 MMHG

## 2024-07-19 DIAGNOSIS — Z76.0 MEDICATION REFILL: ICD-10-CM

## 2024-07-19 DIAGNOSIS — R07.81 RIB PAIN ON LEFT SIDE: Primary | ICD-10-CM

## 2024-07-19 DIAGNOSIS — Z47.89 ORTHOPEDIC AFTERCARE: ICD-10-CM

## 2024-07-19 DIAGNOSIS — M72.0 DUPUYTREN'S DISEASE OF PALM WITH NODULES WITHOUT CONTRACTURE: ICD-10-CM

## 2024-07-19 DIAGNOSIS — Z12.31 ENCOUNTER FOR SCREENING MAMMOGRAM FOR MALIGNANT NEOPLASM OF BREAST: ICD-10-CM

## 2024-07-19 DIAGNOSIS — M19.012 OSTEOARTHRITIS OF LEFT SHOULDER, UNSPECIFIED OSTEOARTHRITIS TYPE: ICD-10-CM

## 2024-07-19 DIAGNOSIS — D3A.8 NEUROENDOCRINE TUMOR OF PANCREAS: ICD-10-CM

## 2024-07-19 PROCEDURE — 99214 OFFICE O/P EST MOD 30 MIN: CPT | Performed by: NURSE PRACTITIONER

## 2024-07-22 PROBLEM — Z12.31 ENCOUNTER FOR SCREENING MAMMOGRAM FOR MALIGNANT NEOPLASM OF BREAST: Status: ACTIVE | Noted: 2024-07-22

## 2024-07-22 PROBLEM — R07.81 RIB PAIN ON LEFT SIDE: Status: ACTIVE | Noted: 2024-07-22

## 2024-07-22 PROBLEM — M72.0: Status: ACTIVE | Noted: 2024-07-22

## 2024-07-22 PROBLEM — D3A.8 NEUROENDOCRINE TUMOR OF PANCREAS: Status: ACTIVE | Noted: 2024-07-22

## 2024-07-22 RX ORDER — TRAMADOL HYDROCHLORIDE 50 MG/1
50 TABLET ORAL EVERY 8 HOURS PRN
Qty: 90 TABLET | Refills: 0 | Status: SHIPPED | OUTPATIENT
Start: 2024-07-22

## 2024-07-22 NOTE — PROGRESS NOTES
"Chief Complaint  Cyst and Breast Pain      64-year-old female patient presents today with complaints of right hand knots and left breast chest pain.          Judy Koehler presents to [facility]        Subjective        64-year-old female patient presents today with complaints of hand and chest issue.    Past medical history of hypertension hyperlipidemia type 2 diabetes, DJD shoulder, migraines, insomnia.    Right anterior palm with \"knots\".  Developed about 3 to 4 months ago.  Lesions are not tender but no pain.  No swelling or growth since they developed.    Patient is with left rib/chest tenderness.  Has been going on for a while.  Patient reports approximately a few months.  Is very painful when wearing a bra.  Hurts intermittently.  Worse when moving arm.  When at rest discomfort is achy and persistent. Denies h/a, palpitations, SOB, fatigue.     Patient reports last month she was diagnosed with \"pancreatic mass   She reports an MRI pleated last week.  I do not have records of that the patient reports it is \"an enclosed tumor \".  She tells me that her specialist would like to repeat the MRI in 6 months as it is a slow-growing mass.  She will see them again in December.    She is in need of refill of tramadol                  Objective         Vital Signs:     /76 (BP Location: Right arm, Patient Position: Sitting, Cuff Size: Adult)   Pulse 96   Temp 97 °F (36.1 °C) (Infrared)   Ht 165.1 cm (65\")   Wt 93 kg (205 lb)   SpO2 100%   BMI 34.11 kg/m²       Physical Exam  Vitals reviewed.   Constitutional:       Appearance: She is well-developed.      Comments:      HENT:      Head: Normocephalic and atraumatic.      Nose: Nose normal.      Mouth/Throat:      Mouth: Mucous membranes are moist.      Pharynx: Oropharynx is clear.   Eyes:      Conjunctiva/sclera: Conjunctivae normal.      Pupils: Pupils are equal, round, and reactive to light.   Cardiovascular:      Rate " and Rhythm: Normal rate and regular rhythm.      Pulses: Normal pulses.      Heart sounds: Normal heart sounds.   Pulmonary:      Effort: Pulmonary effort is normal.   Chest:      Chest wall: Tenderness present. No mass, lacerations, deformity, swelling, crepitus or edema. There is no dullness to percussion.   Breasts:     Left: Normal.          Comments: Tenderness along left rib region between fifth and sixth ribs  Musculoskeletal:         General: Normal range of motion.      Right hand: Tenderness present. No swelling. Decreased range of motion.        Hands:       Cervical back: Normal range of motion.      Comments: Nodules plam of hand x4    Skin:     General: Skin is warm and dry.      Findings: No rash.   Neurological:      Mental Status: She is alert and oriented to person, place, and time.   Psychiatric:         Behavior: Behavior normal.                Cyst    Breast Pain          Patient Active Problem List   Diagnosis    Gastroesophageal reflux disease    Hypertension    Migraine    Mixed hyperlipidemia    Plantar fasciitis of left foot    Rupture of hamstring tendon    Type 2 diabetes mellitus without complication, without long-term current use of insulin    Traumatic complete tear of left rotator cuff    Labral tear of long head of left biceps tendon    Tobacco abuse    Other insomnia    Irritable bowel syndrome with both constipation and diarrhea    DJD of left shoulder    S/P reverse total shoulder arthroplasty, left    Drug-induced constipation    Flank strain, initial encounter    Left upper quadrant abdominal pain    Symptomatic cholelithiasis    Right upper quadrant abdominal pain    Orthopedic aftercare    Medication refill    Status post cholecystectomy    Bilateral myopia    Regular astigmatism of both eyes    Age-related cataract of both eyes    Bilateral presbyopia    Post-cholecystectomy syndrome    Renal cyst, acquired, left    Dupuytren's disease of palm with nodules without contracture     Rib pain on left side    Neuroendocrine tumor of pancreas         Past Medical History:   Diagnosis Date    Allergic     Arthritis     Cancer     Pancreatitis    Cholelithiasis 2023    Colon polyp 5years    Diabetes mellitus     GERD (gastroesophageal reflux disease)     Headache     Histoplasmosis     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    Hyperlipidemia     Hypertension     Irritable bowel syndrome     Low back pain     Murmur     Need for influenza vaccination     Obesity     Seasonal allergies           Family History   Problem Relation Age of Onset    Hypertension Mother     Heart disease Mother     Arthritis Mother     Cancer Mother     Thyroid disease Mother     Diabetes Father     Hyperlipidemia Father     No Known Problems Sister     No Known Problems Sister     No Known Problems Sister     No Known Problems Brother     No Known Problems Brother     No Known Problems Brother     No Known Problems Brother     No Known Problems Son           Past Surgical History:   Procedure Laterality Date     SECTION      CHOLECYSTECTOMY N/A 10/27/2023    Procedure: CHOLECYSTECTOMY LAPAROSCOPIC WITH [employer] ROBOT;  Surgeon: Mario Alexander MD;  [facility];  Service: Robotics - DaVinci;  Laterality: N/A;    COLONOSCOPY      JOINT REPLACEMENT  2023    Left shoulder    LUNG BIOPSY      SUBTOTAL HYSTERECTOMY      TONSILLECTOMY      TOTAL SHOULDER ARTHROPLASTY W/ DISTAL CLAVICLE EXCISION Left 2/15/2023    Procedure: TOTAL SHOULDER REVERSE ARTHROPLASTY;  Surgeon: Willian Gomez MD;  [facility];  Service: Orthopedics;  Laterality: Left;    UPPER ENDOSCOPIC ULTRASOUND W/ FNA N/A 2024    Procedure: ENDOSCOPIC ULTRASOUND WITH FINE NEEDLE ASPIRATION;  Surgeon: Arleth Blood MD;  [facility];  Service: Gastroenterology;  Laterality: N/A;  post: pancreatic body mass and hiatal hernia          Social History     Socioeconomic History    Marital status:    Tobacco Use     Smoking status: Every Day     Current packs/day: 1.00     Average packs/day: 1 pack/day for 5.0 years (5.0 ttl pk-yrs)     Types: Cigarettes     Passive exposure: Current    Smokeless tobacco: Never   Vaping Use    Vaping status: Never Used   Substance and Sexual Activity    Alcohol use: Never    Drug use: Never    Sexual activity: Yes     Partners: Male     Birth control/protection: None                    Result Review :                                                  Assessment and Plan      Diagnoses and all orders for this visit:    1. Rib pain on left side (Primary)    2. Dupuytren's disease of palm with nodules without contracture    3. Neuroendocrine tumor of pancreas    4. Orthopedic aftercare  -     traMADol (ULTRAM) 50 MG tablet; Take 1 tablet by mouth Every 8 (Eight) Hours As Needed for Moderate Pain.  Dispense: 90 tablet; Refill: 0    5. Osteoarthritis of left shoulder, unspecified osteoarthritis type  -     traMADol (ULTRAM) 50 MG tablet; Take 1 tablet by mouth Every 8 (Eight) Hours As Needed for Moderate Pain.  Dispense: 90 tablet; Refill: 0    6. Medication refill      Completely sure what the right rib pain is.  Could possibly be costochondritis however does not have any previous history of.  She has not had any injuries.  Will go ahead and obtained an x-ray to evaluate further.  She is past due for mammogram and would like that ordered as well.  Regarding the hand nodules likely Dupuytren's nodules.  Will go ahead and obtain x-ray for confirmation..  Will refill tramadol                                 Follow Up       No follow-ups on file.      Patient was given instructions and counseling regarding her condition or for health maintenance advice. Please see specific information pulled into the AVS if appropriate.     Brenda Mota, APRN7/22/202410:35 EDT  This note has been electronically signed      Answers submitted by the patient for this visit:  Other (Submitted on 7/17/2024)  Please  describe your symptoms.: Need mammogram and check right hand knots  Have you had these symptoms before?: No  How long have you been having these symptoms?: Greater than 2 weeks  Please list any medications you are currently taking for this condition.: None  Please describe any probable cause for these symptoms. : Pain in left side area and in right hand  Primary Reason for Visit (Submitted on 7/17/2024)  What is the primary reason for your visit?: Other

## 2024-07-23 ENCOUNTER — TELEPHONE (OUTPATIENT)
Dept: FAMILY MEDICINE CLINIC | Facility: CLINIC | Age: 64
End: 2024-07-23
Payer: COMMERCIAL

## 2024-07-23 NOTE — TELEPHONE ENCOUNTER
Caller: Judy Koehler    Relationship: Self    Best call back number: [phone]     What was the call regarding: PATIENT IS NEEDING A PRIOR AUTHORIZATION ON traMADol (ULTRAM) 50 MG tablet     PLEASE ADVISE

## 2024-08-02 ENCOUNTER — TELEPHONE (OUTPATIENT)
Dept: FAMILY MEDICINE CLINIC | Facility: CLINIC | Age: 64
End: 2024-08-02
Payer: COMMERCIAL

## 2024-08-02 DIAGNOSIS — Z12.31 ENCOUNTER FOR SCREENING MAMMOGRAM FOR MALIGNANT NEOPLASM OF BREAST: Primary | ICD-10-CM

## 2024-08-02 NOTE — TELEPHONE ENCOUNTER
Upon looking thru chart, referral notes, this is the note that [facility] added on 7/26:    PLEASE ADVISE THE ORDER HAS TO STATE MAMMO SCREENING BILATERIAL [place] W CAD IN ORDER TO SCHEDULE THE APPT

## 2024-08-02 NOTE — TELEPHONE ENCOUNTER
Caller: Judy Koehler    Relationship: Self    Best call back number: [phone]       Who are you requesting to speak with (clinical staff, provider,  specific staff member): PCP OR CLINICAL      What was the call regarding: PATIENT WAS SCHEDULED FOR A MAMMOGRAM ON 7/26 BUT IT WAS CANCELLED BECAUSE PAPERWORK WAS WRONG.  SHE CALLED MONDAY AND THEY SAID THEY HAVE NOT RECEIVED THE CORRECTED PAPERWORK AND AGAIN TODAY THE SAME.  THIS WAS REFERRED TO PARAMJIT [facility]BRIDE BY [facility].  PATIENT IS ASKING THAT SHE FOLLOWS UP WITH THEM TO SEE WHAT IS NEEDED SO SHE CAN GET HER MAMMOGRAM AS SOON AS POSSIBLE.

## 2024-08-06 ENCOUNTER — TELEPHONE (OUTPATIENT)
Dept: FAMILY MEDICINE CLINIC | Facility: CLINIC | Age: 64
End: 2024-08-06

## 2024-08-06 ENCOUNTER — HOSPITAL ENCOUNTER (OUTPATIENT)
Dept: GENERAL RADIOLOGY | Facility: HOSPITAL | Age: 64
Discharge: HOME | End: 2024-08-06
Payer: COMMERCIAL

## 2024-08-06 DIAGNOSIS — R10.12 LEFT UPPER QUADRANT ABDOMINAL PAIN: Primary | ICD-10-CM

## 2024-08-06 DIAGNOSIS — M72.0 DUPUYTREN'S DISEASE OF PALM WITH NODULES WITHOUT CONTRACTURE: ICD-10-CM

## 2024-08-06 DIAGNOSIS — N64.4 BREAST PAIN, LEFT: ICD-10-CM

## 2024-08-06 DIAGNOSIS — R07.81 RIB PAIN ON LEFT SIDE: ICD-10-CM

## 2024-08-06 PROCEDURE — 71101 X-RAY EXAM UNILAT RIBS/CHEST: CPT

## 2024-08-06 PROCEDURE — 73130 X-RAY EXAM OF HAND: CPT

## 2024-08-06 NOTE — TELEPHONE ENCOUNTER
Caller: Judy Koehler    Relationship: Self    Best call back number: [phone]   What orders are you requesting (i.e. lab or imaging): DIAGNOSTIC MAMMOGRAM     Where will you receive your lab/imaging services:  [facility]    Additional notes: PATIENT WAS SCHEDULED FOR A MAMMOGRAM AT [facility] FOR TOMORROW BUT DUE TO PATIENT HAVING PAIN IN HER LEFT BREAST, THEY TOLD HER THAT THE WRONG MAMMOGRAM WAS ORDERED AND THAT SHE WOULD NEED DIAGNOSTIC SCREENING     PLEASE ADVISE

## 2024-08-07 NOTE — PROGRESS NOTES
Pt xray show arthritis, advanced. We can refer to hand specialty if she would like further evaluation.

## 2024-08-08 DIAGNOSIS — M72.0 DUPUYTREN'S DISEASE OF PALM WITH NODULES WITHOUT CONTRACTURE: Primary | ICD-10-CM

## 2024-08-08 DIAGNOSIS — M79.641 RIGHT HAND PAIN: ICD-10-CM

## 2024-08-08 DIAGNOSIS — M19.041 ARTHRITIS OF RIGHT HAND: ICD-10-CM

## 2024-08-16 ENCOUNTER — HOSPITAL ENCOUNTER (OUTPATIENT)
Dept: ULTRASOUND IMAGING | Facility: HOSPITAL | Age: 64
Discharge: HOME | End: 2024-08-16
Payer: COMMERCIAL

## 2024-08-16 ENCOUNTER — HOSPITAL ENCOUNTER (OUTPATIENT)
Dept: MAMMOGRAPHY | Facility: HOSPITAL | Age: 64
Discharge: HOME | End: 2024-08-16
Payer: COMMERCIAL

## 2024-08-16 DIAGNOSIS — R10.12 LEFT UPPER QUADRANT ABDOMINAL PAIN: ICD-10-CM

## 2024-08-16 DIAGNOSIS — N64.4 BREAST PAIN, LEFT: ICD-10-CM

## 2024-08-16 PROCEDURE — 76642 ULTRASOUND BREAST LIMITED: CPT

## 2024-08-16 PROCEDURE — G0279 TOMOSYNTHESIS, MAMMO: HCPCS

## 2024-08-16 PROCEDURE — 77066 DX MAMMO INCL CAD BI: CPT

## 2024-09-04 ENCOUNTER — OFFICE VISIT (OUTPATIENT)
Dept: FAMILY MEDICINE CLINIC | Facility: CLINIC | Age: 64
End: 2024-09-04
Payer: COMMERCIAL

## 2024-09-04 VITALS
HEART RATE: 85 BPM | BODY MASS INDEX: 34.16 KG/M2 | OXYGEN SATURATION: 98 % | WEIGHT: 205 LBS | SYSTOLIC BLOOD PRESSURE: 112 MMHG | HEIGHT: 65 IN | TEMPERATURE: 97.1 F | DIASTOLIC BLOOD PRESSURE: 76 MMHG

## 2024-09-04 DIAGNOSIS — M54.9 UPPER BACK PAIN: Primary | ICD-10-CM

## 2024-09-04 DIAGNOSIS — R07.81 RIB PAIN ON LEFT SIDE: ICD-10-CM

## 2024-09-04 DIAGNOSIS — Z47.89 ORTHOPEDIC AFTERCARE: ICD-10-CM

## 2024-09-04 DIAGNOSIS — Z00.00 PREVENTATIVE HEALTH CARE: ICD-10-CM

## 2024-09-04 DIAGNOSIS — E11.9 TYPE 2 DIABETES MELLITUS WITHOUT COMPLICATION, WITHOUT LONG-TERM CURRENT USE OF INSULIN: ICD-10-CM

## 2024-09-04 DIAGNOSIS — E78.2 MIXED HYPERLIPIDEMIA: ICD-10-CM

## 2024-09-04 DIAGNOSIS — M19.012 OSTEOARTHRITIS OF LEFT SHOULDER, UNSPECIFIED OSTEOARTHRITIS TYPE: ICD-10-CM

## 2024-09-04 DIAGNOSIS — M79.622 AXILLARY PAIN, LEFT: ICD-10-CM

## 2024-09-04 PROCEDURE — 99214 OFFICE O/P EST MOD 30 MIN: CPT | Performed by: NURSE PRACTITIONER

## 2024-09-04 RX ORDER — TRAMADOL HYDROCHLORIDE 50 MG/1
50 TABLET ORAL EVERY 8 HOURS PRN
Qty: 90 TABLET | Refills: 0 | Status: SHIPPED | OUTPATIENT
Start: 2024-09-04

## 2024-09-04 RX ORDER — CALCIUM CARBONATE 300MG(750)
400 TABLET,CHEWABLE ORAL 2 TIMES DAILY
Qty: 60 TABLET | Refills: 3 | Status: SHIPPED | OUTPATIENT
Start: 2024-09-04

## 2024-09-04 NOTE — PATIENT INSTRUCTIONS
Fasting blood work  Ultrasound left axillary  Thoracic x-ray  Schedule eye exam  Take muscle relaxer at night and use moist heat  Voltaren gel to affected area 4 times a day  Magnesium 400 mg twice daily

## 2024-09-04 NOTE — PROGRESS NOTES
Judy Koehler is a 64 y.o. female.     History of Present Illness  64-year-old white female smoker with history of migraine headaches, GERD, partial hysterectomy, type 2 diabetes, hyperlipidemia, insomnia, IBS and new diagnosis of encapsulated prostate cancer who comes in today for follow-up visit    Blood pressure 112/76 heart rate 84 she denies any chest pain, dyspnea, tachycardia or dizziness    Her main complaint is pain on the left side around bra strap under breast that radiates into the breast area and around to the back area.  She did have a an encounter with being attacked by a dog and at that time complained of pain in the same area around her breast.  This has been going on for months now if not improved is actually getting worse.  She has been on anti-inflammatories tramadol and nothing really seems to be helping with the pain.  Will get a thoracic x-ray ultrasound of area of pain.  Encouraged her to take muscle relaxer at night may use moist heat along with Voltaren gel till we can find out what is going on.  Patient does have pinpoint tenderness below axillary on left side which could be an enlarged lymph node specially with her cancer diagnosis    Patient is getting MRIs every 6 months to check progress of pancreatic tumor so far there is been no changes and they said the surgical risk of removing it would be too high they are going to watch it to make sure there is been no change    Patient states blood sugars are normally less than 160 in the morning we will be getting fasting labs.  Weight is down  5 pounds at 205 with a BMI of 34.1.  She has had 4 COVID vaccines still need to schedule an eye exam.  Her next mammogram is due in August 2025 DEXA scan in April 2025.  She has had a hysterectomy longer does Pap smears her next colonoscopy is due in June 2008          Fasting blood work  Ultrasound left axillary  Thoracic x-ray  Schedule eye exam  Take muscle relaxer at night and use moist  "heat  Voltaren gel to affected area 4 times a day  Magnesium 400 mg twice daily       The following portions of the patient's history were reviewed and updated as appropriate: allergies, current medications, past family history, past medical history, past social history, past surgical history, and problem list.    Vitals:    24 0856   BP: 112/76   BP Location: Right arm   Patient Position: Sitting   Cuff Size: Adult   Pulse: 85   Temp: 97.1 °F (36.2 °C)   TempSrc: Infrared   SpO2: 98%   Weight: 93 kg (205 lb)   Height: 165.1 cm (65\")     Body mass index is 34.11 kg/m².          Past Medical History:   Diagnosis Date    Allergic     Arthritis     Cancer     Pancreatitis    Cholelithiasis 2023    Colon polyp 5years    Diabetes mellitus     GERD (gastroesophageal reflux disease)     Headache     Histoplasmosis     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    Hyperlipidemia     Hypertension     Irritable bowel syndrome     Low back pain     Murmur     Need for influenza vaccination     Obesity     Seasonal allergies      Past Surgical History:   Procedure Laterality Date     SECTION      CHOLECYSTECTOMY N/A 10/27/2023    Procedure: CHOLECYSTECTOMY LAPAROSCOPIC WITH DAVINCI ROBOT;  Surgeon: Mario Alexander MD;  Location: [facility] MAIN OR;  Service: Robotics - DaVinci;  Laterality: N/A;    COLONOSCOPY      JOINT REPLACEMENT  2023    Left shoulder    LUNG BIOPSY      SUBTOTAL HYSTERECTOMY      TONSILLECTOMY      TOTAL SHOULDER ARTHROPLASTY W/ DISTAL CLAVICLE EXCISION Left 2/15/2023    Procedure: TOTAL SHOULDER REVERSE ARTHROPLASTY;  Surgeon: Willian Gomez MD;  Location: [facility] MAIN OR;  Service: Orthopedics;  Laterality: Left;    UPPER ENDOSCOPIC ULTRASOUND W/ FNA N/A 2024    Procedure: ENDOSCOPIC ULTRASOUND WITH FINE NEEDLE ASPIRATION;  Surgeon: Arleth Blood MD;  Location: [facility] ENDOSCOPY;  Service: Gastroenterology;  Laterality: N/A;  post: pancreatic body mass and hiatal hernia     Family History   Problem " Relation Age of Onset    Hypertension Mother     Heart disease Mother     Arthritis Mother     Cancer Mother     Thyroid disease Mother     Diabetes Father     Hyperlipidemia Father     No Known Problems Sister     No Known Problems Sister     No Known Problems Sister     No Known Problems Brother     No Known Problems Brother     No Known Problems Brother     No Known Problems Brother     No Known Problems Son      Immunization History   Administered Date(s) Administered    COVID-19 (PFIZER) Purple Cap Monovalent 01/08/2021, 01/29/2021    Covid-19 (Pfizer) Gray Cap Monovalent 04/06/2022    Fluzone (or Fluarix & Flulaval for VFC) >6mos 10/11/2017, 09/21/2018, 09/27/2019, 10/02/2020, 10/13/2021, 11/23/2022, 12/12/2023    Influenza, Unspecified 09/27/2019, 10/02/2020, 10/13/2021    Pneumococcal Polysaccharide (PPSV23) 12/12/2017    Tdap 07/20/2012, 04/19/2013       Admission on 05/30/2024, Discharged on 05/30/2024   Component Date Value Ref Range Status    Glucose 05/30/2024 173 (H)  70 - 105 mg/dL Final    Serial Number: [id number]:  [id number]    Case Report 05/30/2024    Final                    Value:Medical Cytology Report                           Case: [id number]                                  Authorizing Provider:  Arleth Blood MD          Collected:           05/30/2024 08:50 AM          Ordering Location:     [facility]  Received:            05/30/2024 01:10 PM                                 SUITES                                                                       Pathologist:           Lyle Shen MD                                                             Specimen:    Pancreas, pancreatic body fna                                                              Flow Cytometry Report, Addendum 05/30/2024    Final                    Value:This result contains rich text formatting which cannot be displayed here.    Final Diagnosis 05/30/2024    Final                    Value:This  result contains rich text formatting which cannot be displayed here.    Comment 05/30/2024    Final                    Value:This result contains rich text formatting which cannot be displayed here.    Gross Description 05/30/2024    Final                    Value:This result contains rich text formatting which cannot be displayed here.    Intraoperative Consultation 05/30/2024    Final                    Value:This result contains rich text formatting which cannot be displayed here.         Review of Systems   Constitutional: Negative.    HENT: Negative.     Respiratory: Negative.     Cardiovascular: Negative.    Gastrointestinal: Negative.    Genitourinary: Negative.    Musculoskeletal:         Left sided pain    Skin: Negative.    Neurological: Negative.    Psychiatric/Behavioral: Negative.         Objective   Physical Exam  Constitutional:       Appearance: Normal appearance.   HENT:      Head: Normocephalic.   Cardiovascular:      Rate and Rhythm: Normal rate and regular rhythm.      Pulses: Normal pulses.      Heart sounds: Normal heart sounds.   Pulmonary:      Effort: Pulmonary effort is normal.      Breath sounds: Normal breath sounds.   Abdominal:      General: Bowel sounds are normal.   Musculoskeletal:         General: Normal range of motion.      Comments: Left side pain especially tender to palpation left side below axillary.  Pain is not improved is actually worsened and is continuous   Skin:     General: Skin is warm.   Neurological:      General: No focal deficit present.      Mental Status: She is alert and oriented to person, place, and time.   Psychiatric:         Mood and Affect: Mood normal.         Behavior: Behavior normal.         Procedures    Assessment & Plan   Diagnoses and all orders for this visit:    1. Upper back pain (Primary)  -     XR Spine Thoracic 3 View; Future    2. Axillary pain, left  -     Cancel: US Axilla Left; Future  -     US Axilla Left; Future    3. Mixed  hyperlipidemia  -     Cancel: Lipid Panel With LDL / HDL Ratio  -     Lipid Panel With LDL / HDL Ratio; Future    4. Type 2 diabetes mellitus without complication, without long-term current use of insulin  -     Cancel: Comprehensive Metabolic Panel  -     Cancel: Hemoglobin A1c  -     Comprehensive Metabolic Panel; Future  -     Hemoglobin A1c; Future    5. Preventative health care  -     Cancel: CBC & Differential  -     Cancel: TSH+Free T4  -     Cancel: T3  -     CBC & Differential; Future  -     T3; Future  -     TSH+Free T4; Future    6. Orthopedic aftercare  -     traMADol (ULTRAM) 50 MG tablet; Take 1 tablet by mouth Every 8 (Eight) Hours As Needed for Moderate Pain.  Dispense: 90 tablet; Refill: 0    7. Osteoarthritis of left shoulder, unspecified osteoarthritis type  -     traMADol (ULTRAM) 50 MG tablet; Take 1 tablet by mouth Every 8 (Eight) Hours As Needed for Moderate Pain.  Dispense: 90 tablet; Refill: 0    8. Rib pain on left side    Other orders  -     Magnesium 400 MG tablet; Take 400 mg by mouth 2 (Two) Times a Day.  Dispense: 60 tablet; Refill: 3  -     Diclofenac Sodium (VOLTAREN) 1 % gel gel; Apply 4 g topically to the appropriate area as directed 4 (Four) Times a Day As Needed (muscle pain).  Dispense: 50 g; Refill: 6           Current Outpatient Medications:     acetaminophen (TYLENOL) 325 MG tablet, Take 2 tablets by mouth Every 6 (Six) Hours As Needed for Mild Pain., Disp: , Rfl:     atorvastatin (LIPITOR) 20 MG tablet, Take 1 tablet by mouth once daily, Disp: 90 tablet, Rfl: 3    B-D UF III MINI PEN NEEDLES 31G X 5 MM misc, USE AS DIRECTED, Disp: 100 each, Rfl: 6    Blood Glucose Monitoring Suppl (Blood Glucose Monitor System) w/Device kit, Check blood sugars tid, Disp: 1 each, Rfl: 0    cetirizine (zyrTEC) 10 MG tablet, Take 1 tablet by mouth Daily., Disp: 90 tablet, Rfl: 3    Cyanocobalamin (B-12) 1000 MCG capsule, Take  by mouth., Disp: , Rfl:     cyclobenzaprine (FLEXERIL) 10 MG tablet,  TAKE 1 TABLET BY MOUTH 3 TIMES A DAY AS NEEDED FOR MUSCLE SPASMS., Disp: 90 tablet, Rfl: 1    dicyclomine (BENTYL) 20 MG tablet, Take 1 tablet by mouth 3 (Three) Times a Day., Disp: , Rfl:     diphenhydrAMINE HCl (BENADRYL ALLERGY PO), Take  by mouth., Disp: , Rfl:     gabapentin (NEURONTIN) 300 MG capsule, Take 1 capsule by mouth 2 (Two) Times a Day., Disp: , Rfl:     glimepiride (Amaryl) 2 MG tablet, Take 1 tablet by mouth Every Morning Before Breakfast., Disp: 90 tablet, Rfl: 1    glucose blood test strip, 1 each by Other route 3 times a day. Use as instructed, Disp: 50 each, Rfl: 11    hydrOXYzine (ATARAX) 50 MG tablet, TAKE 1/2 TO 2 TABLETS BY MOUTH AS NEEDED FOR ANXIETY OR  INSOMNIA., Disp: 180 tablet, Rfl: 0    lisinopril (PRINIVIL,ZESTRIL) 20 MG tablet, Take 1 tablet by mouth Daily., Disp: 90 tablet, Rfl: 3    naloxone (NARCAN) 4 MG/0.1ML nasal spray, 1 spray into the nostril(s) as directed by provider As Needed (per overdose)., Disp: 2 each, Rfl: 2    ondansetron (Zofran) 4 MG tablet, Take 1 tablet by mouth Daily As Needed for Nausea or Vomiting., Disp: 30 tablet, Rfl: 1    pantoprazole (PROTONIX) 40 MG EC tablet, Take 1 tablet by mouth Daily., Disp: 90 tablet, Rfl: 3    polyethylene glycol (MIRALAX) 17 g packet, Take 17 g by mouth Daily., Disp: 14 each, Rfl: 0    promethazine (PHENERGAN) 12.5 MG tablet, 1-2 q6h prn, Disp: 60 tablet, Rfl: 2    Semaglutide, 2 MG/DOSE, (Ozempic, 2 MG/DOSE,) 8 MG/3ML solution pen-injector, INJECT 2 MG SUBCUTANEOUSLY ONCE A WEEK, Disp: 3 mL, Rfl: 2    traMADol (ULTRAM) 50 MG tablet, Take 1 tablet by mouth Every 8 (Eight) Hours As Needed for Moderate Pain., Disp: 90 tablet, Rfl: 0    Triamcinolone Acetonide (Nasacort Allergy 24HR) 55 MCG/ACT nasal inhaler, 2 sprays into the nostril(s) as directed by provider Daily., Disp: 16.5 g, Rfl: 2    vitamin D (ERGOCALCIFEROL) 1.25 MG (10404 UT) capsule capsule, TAKE 1 CAPSULE BY MOUTH 1 TIME PER WEEK., Disp: 12 capsule, Rfl: 1     Diclofenac Sodium (VOLTAREN) 1 % gel gel, Apply 4 g topically to the appropriate area as directed 4 (Four) Times a Day As Needed (muscle pain)., Disp: 50 g, Rfl: 6    Magnesium 400 MG tablet, Take 400 mg by mouth 2 (Two) Times a Day., Disp: 60 tablet, Rfl: 3           YANY Zaidi 9/4/2024 09:50 EDT  This note has been electronically signed

## 2024-09-06 LAB
ALBUMIN SERPL-MCNC: 3.9 G/DL (ref 3.9–4.9)
ALP SERPL-CCNC: 124 IU/L (ref 44–121)
ALT SERPL-CCNC: 16 IU/L (ref 0–32)
AST SERPL-CCNC: 14 IU/L (ref 0–40)
BASOPHILS # BLD AUTO: 0.1 X10E3/UL (ref 0–0.2)
BASOPHILS NFR BLD AUTO: 1 %
BILIRUB SERPL-MCNC: 0.4 MG/DL (ref 0–1.2)
BUN SERPL-MCNC: 15 MG/DL (ref 8–27)
BUN/CREAT SERPL: 16 (ref 12–28)
CALCIUM SERPL-MCNC: 8.9 MG/DL (ref 8.7–10.3)
CHLORIDE SERPL-SCNC: 103 MMOL/L (ref 96–106)
CHOLEST SERPL-MCNC: 131 MG/DL (ref 100–199)
CO2 SERPL-SCNC: 24 MMOL/L (ref 20–29)
CREAT SERPL-MCNC: 0.94 MG/DL (ref 0.57–1)
EGFRCR SERPLBLD CKD-EPI 2021: 68 ML/MIN/1.73
EOSINOPHIL # BLD AUTO: 0.2 X10E3/UL (ref 0–0.4)
EOSINOPHIL NFR BLD AUTO: 2 %
ERYTHROCYTE [DISTWIDTH] IN BLOOD BY AUTOMATED COUNT: 12.5 % (ref 11.7–15.4)
GLOBULIN SER CALC-MCNC: 2.4 G/DL (ref 1.5–4.5)
GLUCOSE SERPL-MCNC: 150 MG/DL (ref 70–99)
HBA1C MFR BLD: 6.3 % (ref 4.8–5.6)
HCT VFR BLD AUTO: 49.3 % (ref 34–46.6)
HDLC SERPL-MCNC: 44 MG/DL
HGB BLD-MCNC: 15.3 G/DL (ref 11.1–15.9)
IMM GRANULOCYTES # BLD AUTO: 0 X10E3/UL (ref 0–0.1)
IMM GRANULOCYTES NFR BLD AUTO: 0 %
LDLC SERPL CALC-MCNC: 61 MG/DL (ref 0–99)
LDLC/HDLC SERPL: 1.4 RATIO (ref 0–3.2)
LYMPHOCYTES # BLD AUTO: 3.2 X10E3/UL (ref 0.7–3.1)
LYMPHOCYTES NFR BLD AUTO: 40 %
MCH RBC QN AUTO: 28.7 PG (ref 26.6–33)
MCHC RBC AUTO-ENTMCNC: 31 G/DL (ref 31.5–35.7)
MCV RBC AUTO: 93 FL (ref 79–97)
MONOCYTES # BLD AUTO: 0.4 X10E3/UL (ref 0.1–0.9)
MONOCYTES NFR BLD AUTO: 5 %
NEUTROPHILS # BLD AUTO: 4.2 X10E3/UL (ref 1.4–7)
NEUTROPHILS NFR BLD AUTO: 52 %
PLATELET # BLD AUTO: 274 X10E3/UL (ref 150–450)
POTASSIUM SERPL-SCNC: 4.6 MMOL/L (ref 3.5–5.2)
PROT SERPL-MCNC: 6.3 G/DL (ref 6–8.5)
RBC # BLD AUTO: 5.33 X10E6/UL (ref 3.77–5.28)
SODIUM SERPL-SCNC: 137 MMOL/L (ref 134–144)
T3 SERPL-MCNC: 139 NG/DL (ref 71–180)
T4 FREE SERPL-MCNC: 1.3 NG/DL (ref 0.82–1.77)
TRIGL SERPL-MCNC: 151 MG/DL (ref 0–149)
TSH SERPL DL<=0.005 MIU/L-ACNC: 2.39 UIU/ML (ref 0.45–4.5)
VLDLC SERPL CALC-MCNC: 26 MG/DL (ref 5–40)
WBC # BLD AUTO: 8 X10E3/UL (ref 3.4–10.8)

## 2024-09-09 ENCOUNTER — HOSPITAL ENCOUNTER (OUTPATIENT)
Dept: GENERAL RADIOLOGY | Facility: HOSPITAL | Age: 64
Discharge: HOME | End: 2024-09-09
Payer: COMMERCIAL

## 2024-09-09 ENCOUNTER — HOSPITAL ENCOUNTER (OUTPATIENT)
Dept: ULTRASOUND IMAGING | Facility: HOSPITAL | Age: 64
Discharge: HOME | End: 2024-09-09
Payer: COMMERCIAL

## 2024-09-09 DIAGNOSIS — M54.9 UPPER BACK PAIN: ICD-10-CM

## 2024-09-09 DIAGNOSIS — M79.622 AXILLARY PAIN, LEFT: ICD-10-CM

## 2024-09-09 PROCEDURE — 76604 US EXAM CHEST: CPT

## 2024-09-09 PROCEDURE — 72072 X-RAY EXAM THORAC SPINE 3VWS: CPT

## 2024-09-11 ENCOUNTER — TELEPHONE (OUTPATIENT)
Dept: FAMILY MEDICINE CLINIC | Facility: CLINIC | Age: 64
End: 2024-09-11
Payer: COMMERCIAL

## 2024-09-11 ENCOUNTER — PATIENT MESSAGE (OUTPATIENT)
Dept: FAMILY MEDICINE CLINIC | Facility: CLINIC | Age: 64
End: 2024-09-11
Payer: COMMERCIAL

## 2024-09-11 NOTE — TELEPHONE ENCOUNTER
Caller: Judy Koehler    Relationship: Self    Best call back number: [phone]    Caller requesting test results: SELF    What test was performed: XRAY    Additional notes: PATIENT WOULD LIKE TO GO OVER RECENT XRAY. PLEASE CALL WITH RESULTS.

## 2024-09-16 ENCOUNTER — TELEPHONE (OUTPATIENT)
Dept: FAMILY MEDICINE CLINIC | Facility: CLINIC | Age: 64
End: 2024-09-16
Payer: COMMERCIAL

## 2024-09-16 DIAGNOSIS — M54.6 ACUTE LEFT-SIDED THORACIC BACK PAIN: Primary | ICD-10-CM

## 2024-09-27 DIAGNOSIS — E11.9 TYPE 2 DIABETES MELLITUS WITHOUT COMPLICATION, WITHOUT LONG-TERM CURRENT USE OF INSULIN: ICD-10-CM

## 2024-09-27 DIAGNOSIS — M19.012 OSTEOARTHRITIS OF LEFT SHOULDER, UNSPECIFIED OSTEOARTHRITIS TYPE: ICD-10-CM

## 2024-09-27 DIAGNOSIS — Z47.89 ORTHOPEDIC AFTERCARE: ICD-10-CM

## 2024-09-27 RX ORDER — HYDROXYZINE HYDROCHLORIDE 50 MG/1
TABLET, FILM COATED ORAL
Qty: 180 TABLET | Refills: 0 | Status: SHIPPED | OUTPATIENT
Start: 2024-09-27

## 2024-09-27 RX ORDER — GLIMEPIRIDE 2 MG/1
2 TABLET ORAL
Qty: 90 TABLET | Refills: 1 | Status: SHIPPED | OUTPATIENT
Start: 2024-09-27

## 2024-09-30 RX ORDER — SEMAGLUTIDE 2.68 MG/ML
2 INJECTION, SOLUTION SUBCUTANEOUS WEEKLY
Qty: 3 ML | Refills: 0 | Status: SHIPPED | OUTPATIENT
Start: 2024-09-30

## 2024-10-05 RX ORDER — TRAMADOL HYDROCHLORIDE 50 MG/1
50 TABLET ORAL EVERY 8 HOURS PRN
Qty: 90 TABLET | Refills: 0 | Status: SHIPPED | OUTPATIENT
Start: 2024-10-05

## 2024-10-16 ENCOUNTER — OFFICE VISIT (OUTPATIENT)
Dept: FAMILY MEDICINE CLINIC | Facility: CLINIC | Age: 64
End: 2024-10-16
Payer: COMMERCIAL

## 2024-10-16 VITALS
SYSTOLIC BLOOD PRESSURE: 111 MMHG | WEIGHT: 203 LBS | HEART RATE: 94 BPM | DIASTOLIC BLOOD PRESSURE: 73 MMHG | OXYGEN SATURATION: 99 % | BODY MASS INDEX: 33.82 KG/M2 | HEIGHT: 65 IN | TEMPERATURE: 97.3 F

## 2024-10-16 DIAGNOSIS — Z23 NEED FOR PNEUMOCOCCAL 20-VALENT CONJUGATE VACCINATION: ICD-10-CM

## 2024-10-16 DIAGNOSIS — R30.0 DYSURIA: ICD-10-CM

## 2024-10-16 DIAGNOSIS — Z23 NEED FOR INFLUENZA VACCINATION: Primary | ICD-10-CM

## 2024-10-16 DIAGNOSIS — N30.00 ACUTE CYSTITIS WITHOUT HEMATURIA: ICD-10-CM

## 2024-10-16 PROCEDURE — 90677 PCV20 VACCINE IM: CPT | Performed by: NURSE PRACTITIONER

## 2024-10-16 PROCEDURE — 81003 URINALYSIS AUTO W/O SCOPE: CPT | Performed by: NURSE PRACTITIONER

## 2024-10-16 PROCEDURE — 90656 IIV3 VACC NO PRSV 0.5 ML IM: CPT | Performed by: NURSE PRACTITIONER

## 2024-10-16 PROCEDURE — 99213 OFFICE O/P EST LOW 20 MIN: CPT | Performed by: NURSE PRACTITIONER

## 2024-10-16 PROCEDURE — 90472 IMMUNIZATION ADMIN EACH ADD: CPT | Performed by: NURSE PRACTITIONER

## 2024-10-16 PROCEDURE — 90471 IMMUNIZATION ADMIN: CPT | Performed by: NURSE PRACTITIONER

## 2024-10-16 RX ORDER — CEPHALEXIN 500 MG/1
500 CAPSULE ORAL 2 TIMES DAILY
Qty: 14 CAPSULE | Refills: 0 | Status: SHIPPED | OUTPATIENT
Start: 2024-10-16

## 2024-10-16 RX ORDER — PHENAZOPYRIDINE HYDROCHLORIDE 100 MG/1
100 TABLET, FILM COATED ORAL 3 TIMES DAILY PRN
Qty: 6 TABLET | Refills: 0 | Status: SHIPPED | OUTPATIENT
Start: 2024-10-16

## 2024-10-16 RX ORDER — ATORVASTATIN CALCIUM 20 MG/1
20 TABLET, FILM COATED ORAL DAILY
Qty: 90 TABLET | Refills: 1 | Status: SHIPPED | OUTPATIENT
Start: 2024-10-16

## 2024-10-16 RX ORDER — ERGOCALCIFEROL 1.25 MG/1
50000 CAPSULE, LIQUID FILLED ORAL
Qty: 12 CAPSULE | Refills: 1 | Status: SHIPPED | OUTPATIENT
Start: 2024-10-16

## 2024-10-16 NOTE — PROGRESS NOTES
"    Judy Koehler is a 64 y.o. female.     History of Present Illness  Patient 64-year-old white female smoker with history of migraine headache, GERD, partial hysterectomy, type 2 diabetes, hyperlipidemia, insomnia, IBS and encapsulated pancreatic cancer who comes in today for an acute visit    Blood pressure 110/72 heart rate 94 she denies any chest pain, dyspnea, tachycardia or dizziness    Patient states for the last 4 days she has been having dysuria urgency and frequency along with bladder spasm.  Urinalysis did not show much today we will treat her on symptoms and we will send off a culture.    Patient had 4 COVID vaccines still need to schedule an eye exam.  Her next mammogram is due in August 2025 DEXA scan April 2025 she has had a hysterectomy no longer does Pap smears.  Her next colonoscopy is due in 2028.  She got a flu shot and pneumonia booster today          UA/urine culture  Influenza/pneumonia vaccine today  Keflex 500 mg twice daily x 7 days  Pyridium 100 mg 3 times daily x 2 days  Increase p.o. fluids  Follow-up urine 1 week after last antibiotic dose       The following portions of the patient's history were reviewed and updated as appropriate: allergies, current medications, past family history, past medical history, past social history, past surgical history, and problem list.    Vitals:    10/16/24 1113   BP: 111/73   BP Location: Right arm   Patient Position: Sitting   Cuff Size: Adult   Pulse: 94   Temp: 97.3 °F (36.3 °C)   TempSrc: Infrared   SpO2: 99%   Weight: 92.1 kg (203 lb)   Height: 165.1 cm (65\")     Body mass index is 33.78 kg/m².          Past Medical History:   Diagnosis Date    Allergic     Arthritis     Cancer     Pancreatitis    Cholelithiasis Sept 2023    Colon polyp 5years    Diabetes mellitus     GERD (gastroesophageal reflux disease)     Headache     Histoplasmosis     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    Hyperlipidemia     Hypertension     Irritable bowel syndrome     Low back pain     Murmur "     Need for influenza vaccination     Obesity     Seasonal allergies      Past Surgical History:   Procedure Laterality Date     SECTION      CHOLECYSTECTOMY N/A 10/27/2023    Procedure: CHOLECYSTECTOMY LAPAROSCOPIC WITH DAVINCI ROBOT;  Surgeon: Mario Alexander MD;  Location: [facility] MAIN OR;  Service: Robotics - DaVinci;  Laterality: N/A;    COLONOSCOPY      JOINT REPLACEMENT  2023    Left shoulder    LUNG BIOPSY      SUBTOTAL HYSTERECTOMY      TONSILLECTOMY      TOTAL SHOULDER ARTHROPLASTY W/ DISTAL CLAVICLE EXCISION Left 2/15/2023    Procedure: TOTAL SHOULDER REVERSE ARTHROPLASTY;  Surgeon: Willian Gomez MD;  Location: [facility] MAIN OR;  Service: Orthopedics;  Laterality: Left;    UPPER ENDOSCOPIC ULTRASOUND W/ FNA N/A 2024    Procedure: ENDOSCOPIC ULTRASOUND WITH FINE NEEDLE ASPIRATION;  Surgeon: Arleth Blood MD;  Location: [facility] ENDOSCOPY;  Service: Gastroenterology;  Laterality: N/A;  post: pancreatic body mass and hiatal hernia     Family History   Problem Relation Age of Onset    Hypertension Mother     Heart disease Mother     Arthritis Mother     Cancer Mother     Thyroid disease Mother     Diabetes Father     Hyperlipidemia Father     No Known Problems Sister     No Known Problems Sister     No Known Problems Sister     No Known Problems Brother     No Known Problems Brother     No Known Problems Brother     No Known Problems Brother     No Known Problems Son      Immunization History   Administered Date(s) Administered    COVID-19 (PFIZER) Purple Cap Monovalent 2021, 2021    Covid-19 (Pfizer) Gray Cap Monovalent 2022    Fluzone  >6mos 10/16/2024    Fluzone (or Fluarix & Flulaval for VFC) >6mos 10/11/2017, 2018, 2019, 10/02/2020, 10/13/2021, 2022, 2023    Influenza, Unspecified 2019, 10/02/2020, 10/13/2021    Pneumococcal Conjugate 20-Valent (PCV20) 10/16/2024    Pneumococcal Polysaccharide (PPSV23) 2017    Tdap 2012,  04/19/2013       Office Visit on 09/04/2024   Component Date Value Ref Range Status    T3, Total 09/05/2024 139  71 - 180 ng/dL Final    Total Cholesterol 09/05/2024 131  100 - 199 mg/dL Final    Triglycerides 09/05/2024 151 (H)  0 - 149 mg/dL Final    HDL Cholesterol 09/05/2024 44  >39 mg/dL Final    VLDL Cholesterol Tito 09/05/2024 26  5 - 40 mg/dL Final    LDL Chol Calc (NIH) 09/05/2024 61  0 - 99 mg/dL Final    LDL/HDL RATIO 09/05/2024 1.4  0.0 - 3.2 ratio Final    Comment:                                     LDL/HDL Ratio                                              Men  Women                                1/2 Avg.Risk  1.0    1.5                                    Avg.Risk  3.6    3.2                                 2X Avg.Risk  6.2    5.0                                 3X Avg.Risk  8.0    6.1      Hemoglobin A1C 09/05/2024 6.3 (H)  4.8 - 5.6 % Final    Comment:          Prediabetes: 5.7 - 6.4           Diabetes: >6.4           Glycemic control for adults with diabetes: <7.0      Glucose 09/05/2024 150 (H)  70 - 99 mg/dL Final    BUN 09/05/2024 15  8 - 27 mg/dL Final    Creatinine 09/05/2024 0.94  0.57 - 1.00 mg/dL Final    EGFR Result 09/05/2024 68  >59 mL/min/1.73 Final    BUN/Creatinine Ratio 09/05/2024 16  12 - 28 Final    Sodium 09/05/2024 137  134 - 144 mmol/L Final    Potassium 09/05/2024 4.6  3.5 - 5.2 mmol/L Final    Chloride 09/05/2024 103  96 - 106 mmol/L Final    Total CO2 09/05/2024 24  20 - 29 mmol/L Final    Calcium 09/05/2024 8.9  8.7 - 10.3 mg/dL Final    Total Protein 09/05/2024 6.3  6.0 - 8.5 g/dL Final    Albumin 09/05/2024 3.9  3.9 - 4.9 g/dL Final    Globulin 09/05/2024 2.4  1.5 - 4.5 g/dL Final    Total Bilirubin 09/05/2024 0.4  0.0 - 1.2 mg/dL Final    Alkaline Phosphatase 09/05/2024 124 (H)  44 - 121 IU/L Final    AST (SGOT) 09/05/2024 14  0 - 40 IU/L Final    ALT (SGPT) 09/05/2024 16  0 - 32 IU/L Final    WBC 09/05/2024 8.0  3.4 - 10.8 x10E3/uL Final    RBC 09/05/2024 5.33 (H)  3.77  - 5.28 x10E6/uL Final    Hemoglobin 09/05/2024 15.3  11.1 - 15.9 g/dL Final    Hematocrit 09/05/2024 49.3 (H)  34.0 - 46.6 % Final    MCV 09/05/2024 93  79 - 97 fL Final    MCH 09/05/2024 28.7  26.6 - 33.0 pg Final    MCHC 09/05/2024 31.0 (L)  31.5 - 35.7 g/dL Final    RDW 09/05/2024 12.5  11.7 - 15.4 % Final    Platelets 09/05/2024 274  150 - 450 x10E3/uL Final    Neutrophil Rel % 09/05/2024 52  Not Estab. % Final    Lymphocyte Rel % 09/05/2024 40  Not Estab. % Final    Monocyte Rel % 09/05/2024 5  Not Estab. % Final    Eosinophil Rel % 09/05/2024 2  Not Estab. % Final    Basophil Rel % 09/05/2024 1  Not Estab. % Final    Neutrophils Absolute 09/05/2024 4.2  1.4 - 7.0 x10E3/uL Final    Lymphocytes Absolute 09/05/2024 3.2 (H)  0.7 - 3.1 x10E3/uL Final    Monocytes Absolute 09/05/2024 0.4  0.1 - 0.9 x10E3/uL Final    Eosinophils Absolute 09/05/2024 0.2  0.0 - 0.4 x10E3/uL Final    Basophils Absolute 09/05/2024 0.1  0.0 - 0.2 x10E3/uL Final    Immature Granulocyte Rel % 09/05/2024 0  Not Estab. % Final    Immature Grans Absolute 09/05/2024 0.0  0.0 - 0.1 x10E3/uL Final    TSH 09/05/2024 2.390  0.450 - 4.500 uIU/mL Final    Free T4 09/05/2024 1.30  0.82 - 1.77 ng/dL Final         Review of Systems   Constitutional: Negative.    HENT: Negative.     Respiratory: Negative.     Cardiovascular: Negative.    Gastrointestinal: Negative.    Genitourinary:  Positive for dysuria, frequency, pelvic pain and urgency.   Musculoskeletal: Negative.    Skin: Negative.    Neurological: Negative.    Psychiatric/Behavioral: Negative.       She is a 2 corticate  Objective   Physical Exam  Constitutional:       Appearance: Normal appearance.   HENT:      Head: Normocephalic.   Cardiovascular:      Rate and Rhythm: Normal rate and regular rhythm.      Pulses: Normal pulses.      Heart sounds: Normal heart sounds.   Pulmonary:      Effort: Pulmonary effort is normal.      Breath sounds: Normal breath sounds.   Abdominal:      General: Bowel  sounds are normal.   Musculoskeletal:         General: Normal range of motion.   Skin:     General: Skin is warm.   Neurological:      General: No focal deficit present.      Mental Status: She is alert and oriented to person, place, and time.   Psychiatric:         Mood and Affect: Mood normal.         Behavior: Behavior normal.         Procedures    Assessment & Plan   Diagnoses and all orders for this visit:    1. Need for influenza vaccination (Primary)  -     Fluzone >6mos    2. Dysuria  -     POC Urinalysis Dipstick, Automated  -     Urine Culture - Urine, Urine, Clean Catch    3. Need for pneumococcal 20-valent conjugate vaccination  -     Pneumococcal Conjugate Vaccine 20-Valent All    4. Acute cystitis without hematuria    Other orders  -     cephalexin (Keflex) 500 MG capsule; Take 1 capsule by mouth 2 (Two) Times a Day.  Dispense: 14 capsule; Refill: 0  -     phenazopyridine (Pyridium) 100 MG tablet; Take 1 tablet by mouth 3 (Three) Times a Day As Needed for Bladder Spasms.  Dispense: 6 tablet; Refill: 0  -     atorvastatin (LIPITOR) 20 MG tablet; Take 1 tablet by mouth Daily.  Dispense: 90 tablet; Refill: 1  -     vitamin D (ERGOCALCIFEROL) 1.25 MG (31775 UT) capsule capsule; Take 1 capsule by mouth Every 7 (Seven) Days.  Dispense: 12 capsule; Refill: 1           Current Outpatient Medications:     acetaminophen (TYLENOL) 325 MG tablet, Take 2 tablets by mouth Every 6 (Six) Hours As Needed for Mild Pain., Disp: , Rfl:     atorvastatin (LIPITOR) 20 MG tablet, Take 1 tablet by mouth Daily., Disp: 90 tablet, Rfl: 1    B-D UF III MINI PEN NEEDLES 31G X 5 MM misc, USE AS DIRECTED, Disp: 100 each, Rfl: 6    Blood Glucose Monitoring Suppl (Blood Glucose Monitor System) w/Device kit, Check blood sugars tid, Disp: 1 each, Rfl: 0    cetirizine (zyrTEC) 10 MG tablet, Take 1 tablet by mouth Daily., Disp: 90 tablet, Rfl: 3    Cyanocobalamin (B-12) 1000 MCG capsule, Take  by mouth., Disp: , Rfl:     cyclobenzaprine  (FLEXERIL) 10 MG tablet, TAKE 1 TABLET BY MOUTH 3 TIMES A DAY AS NEEDED FOR MUSCLE SPASMS., Disp: 90 tablet, Rfl: 1    Diclofenac Sodium (VOLTAREN) 1 % gel gel, Apply 4 g topically to the appropriate area as directed 4 (Four) Times a Day As Needed (muscle pain)., Disp: 50 g, Rfl: 6    dicyclomine (BENTYL) 20 MG tablet, Take 1 tablet by mouth 3 (Three) Times a Day., Disp: , Rfl:     diphenhydrAMINE HCl (BENADRYL ALLERGY PO), Take  by mouth., Disp: , Rfl:     gabapentin (NEURONTIN) 300 MG capsule, Take 1 capsule by mouth 2 (Two) Times a Day., Disp: , Rfl:     glimepiride (Amaryl) 2 MG tablet, Take 1 tablet by mouth Every Morning Before Breakfast., Disp: 90 tablet, Rfl: 1    glucose blood test strip, 1 each by Other route 3 times a day. Use as instructed, Disp: 50 each, Rfl: 11    hydrOXYzine (ATARAX) 50 MG tablet, TAKE 1/2 TO 2 TABLETS BY MOUTH AS NEEDED FOR ANXIETY OR  INSOMNIA., Disp: 180 tablet, Rfl: 0    lisinopril (PRINIVIL,ZESTRIL) 20 MG tablet, Take 1 tablet by mouth Daily., Disp: 90 tablet, Rfl: 3    Magnesium 400 MG tablet, Take 400 mg by mouth 2 (Two) Times a Day., Disp: 60 tablet, Rfl: 3    naloxone (NARCAN) 4 MG/0.1ML nasal spray, 1 spray into the nostril(s) as directed by provider As Needed (per overdose)., Disp: 2 each, Rfl: 2    ondansetron (Zofran) 4 MG tablet, Take 1 tablet by mouth Daily As Needed for Nausea or Vomiting., Disp: 30 tablet, Rfl: 1    pantoprazole (PROTONIX) 40 MG EC tablet, Take 1 tablet by mouth Daily., Disp: 90 tablet, Rfl: 3    polyethylene glycol (MIRALAX) 17 g packet, Take 17 g by mouth Daily., Disp: 14 each, Rfl: 0    promethazine (PHENERGAN) 12.5 MG tablet, 1-2 q6h prn, Disp: 60 tablet, Rfl: 2    Semaglutide, 2 MG/DOSE, (Ozempic, 2 MG/DOSE,) 8 MG/3ML solution pen-injector, Inject 2 mg under the skin into the appropriate area as directed 1 (One) Time Per Week., Disp: 3 mL, Rfl: 0    traMADol (ULTRAM) 50 MG tablet, Take 1 tablet by mouth Every 8 (Eight) Hours As Needed for  Moderate Pain., Disp: 90 tablet, Rfl: 0    Triamcinolone Acetonide (Nasacort Allergy 24HR) 55 MCG/ACT nasal inhaler, 2 sprays into the nostril(s) as directed by provider Daily., Disp: 16.5 g, Rfl: 2    vitamin D (ERGOCALCIFEROL) 1.25 MG (43797 UT) capsule capsule, Take 1 capsule by mouth Every 7 (Seven) Days., Disp: 12 capsule, Rfl: 1    cephalexin (Keflex) 500 MG capsule, Take 1 capsule by mouth 2 (Two) Times a Day., Disp: 14 capsule, Rfl: 0    phenazopyridine (Pyridium) 100 MG tablet, Take 1 tablet by mouth 3 (Three) Times a Day As Needed for Bladder Spasms., Disp: 6 tablet, Rfl: 0           YANY Zaidi 10/16/2024 11:43 EDT  This note has been electronically signed

## 2024-10-16 NOTE — PATIENT INSTRUCTIONS
UA/urine culture  Influenza/pneumonia vaccine today  Keflex 500 mg twice daily x 7 days  Pyridium 100 mg 3 times daily x 2 days  Increase p.o. fluids  Follow-up urine 1 week after last antibiotic dose

## 2024-10-21 LAB
BACTERIA UR CULT: ABNORMAL
BACTERIA UR CULT: ABNORMAL
OTHER ANTIBIOTIC SUSC ISLT: ABNORMAL

## 2024-10-21 NOTE — PROGRESS NOTES
CHIEF COMPLAINT  Mid back pain      Subjective   Judy Koehler is a 64 y.o. female who was referred by  Debbie Phillips APRN to our pain management clinic for consultation, evaluation and treatment of mid back pain.Mid back pain started about 6 moths ago and L hip pain started about 3 years ago. L hip pain started after a fall.     Mid left sided rib pain is 4/10 on VAS, at maximum is 7/10. Pain is soreness, sharp, stabbing in nature. Pain is referred left breast, mid back. The pain is constant. The pain is improved by sitting on couch with heating pad, some pain relief with muscle relaxants and gabapentin. The pain is worse with house work, increased activities, lifting. Trouble sleeping due to pain    Left hip pain is 3/10  on VAS. Hx of hamstring tear 2021. She has seen ortho who recommended conservative management. Worse with sitting for too long or walking too long.     PHQ-9- 6    SOAPP- 4  [place] back disability scale -     PMH:   DM-2-last A1c 6.3, hypertension, migraines, GERD, IBS, encapsulated pancreatic cancer- getting regular MRI-oncology has recommended conservative management, left shoulder replacement, for left hamstring tear-Ortho has recommended conservative management and no surgeries, left-sided lipoma from piriformis-seen by surgeon who is recommended conservative management.    Current Medications:   Tramadol  Flexeril 10 mg  Tylenol  Gabapentin 300 mg BID      Past Medications:    Past Modalities:  TENS:       no          Physical Therapy Within The Last 6 Months     no  Psychotherapy     no  Massage Therapy      no    Patient Complains Of:  Uro-Fecal Incontinence no  Weight Gain/Loss  no  Fever/Chills   no  Weakness   no      PEG Assessment   What number best describes your pain on average in the past week?6  What number best describes how, during the past week, pain has interfered with your enjoyment of life?6  What number best describes how, during the past week, pain has interfered with  your general activity?  6    PHQ-9 Depression Screening  Little interest or pleasure in doing things? Not at all   Feeling down, depressed, or hopeless? Not at all   PHQ-2 Total Score 0   Trouble falling or staying asleep, or sleeping too much?     Feeling tired or having little energy?     Poor appetite or overeating?     Feeling bad about yourself - or that you are a failure or have let yourself or your family down?     Trouble concentrating on things, such as reading the newspaper or watching television?     Moving or speaking so slowly that other people could have noticed? Or the opposite - being so fidgety or restless that you have been moving around a lot more than usual?     Thoughts that you would be better off dead, or of hurting yourself in some way?     PHQ-9 Total Score     If you checked off any problems, how difficult have these problems made it for you to do your work, take care of things at home, or get along with other people?          Patient screened positive for depression based on a PHQ-9 score of  on . Follow-up recommendations include: .        Current Outpatient Medications:     acetaminophen (TYLENOL) 325 MG tablet, Take 2 tablets by mouth Every 6 (Six) Hours As Needed for Mild Pain., Disp: , Rfl:     atorvastatin (LIPITOR) 20 MG tablet, Take 1 tablet by mouth Daily., Disp: 90 tablet, Rfl: 1    B-D UF III MINI PEN NEEDLES 31G X 5 MM misc, USE AS DIRECTED, Disp: 100 each, Rfl: 6    Blood Glucose Monitoring Suppl (Blood Glucose Monitor System) w/Device kit, Check blood sugars tid, Disp: 1 each, Rfl: 0    cephalexin (Keflex) 500 MG capsule, Take 1 capsule by mouth 2 (Two) Times a Day., Disp: 14 capsule, Rfl: 0    cetirizine (zyrTEC) 10 MG tablet, Take 1 tablet by mouth Daily., Disp: 90 tablet, Rfl: 3    Cyanocobalamin (B-12) 1000 MCG capsule, Take  by mouth., Disp: , Rfl:     cyclobenzaprine (FLEXERIL) 10 MG tablet, TAKE 1 TABLET BY MOUTH 3 TIMES A DAY AS NEEDED FOR MUSCLE SPASMS., Disp: 90  tablet, Rfl: 1    Diclofenac Sodium (VOLTAREN) 1 % gel gel, Apply 4 g topically to the appropriate area as directed 4 (Four) Times a Day As Needed (muscle pain)., Disp: 50 g, Rfl: 6    dicyclomine (BENTYL) 20 MG tablet, Take 1 tablet by mouth 3 (Three) Times a Day., Disp: , Rfl:     diphenhydrAMINE HCl (BENADRYL ALLERGY PO), Take  by mouth., Disp: , Rfl:     glimepiride (Amaryl) 2 MG tablet, Take 1 tablet by mouth Every Morning Before Breakfast., Disp: 90 tablet, Rfl: 1    glucose blood test strip, 1 each by Other route 3 times a day. Use as instructed, Disp: 50 each, Rfl: 11    hydrOXYzine (ATARAX) 50 MG tablet, TAKE 1/2 TO 2 TABLETS BY MOUTH AS NEEDED FOR ANXIETY OR  INSOMNIA., Disp: 180 tablet, Rfl: 0    lisinopril (PRINIVIL,ZESTRIL) 20 MG tablet, Take 1 tablet by mouth Daily., Disp: 90 tablet, Rfl: 3    Magnesium 400 MG tablet, Take 400 mg by mouth 2 (Two) Times a Day., Disp: 60 tablet, Rfl: 3    naloxone (NARCAN) 4 MG/0.1ML nasal spray, 1 spray into the nostril(s) as directed by provider As Needed (per overdose)., Disp: 2 each, Rfl: 2    ondansetron (Zofran) 4 MG tablet, Take 1 tablet by mouth Daily As Needed for Nausea or Vomiting., Disp: 30 tablet, Rfl: 1    pantoprazole (PROTONIX) 40 MG EC tablet, Take 1 tablet by mouth Daily., Disp: 90 tablet, Rfl: 3    phenazopyridine (Pyridium) 100 MG tablet, Take 1 tablet by mouth 3 (Three) Times a Day As Needed for Bladder Spasms., Disp: 6 tablet, Rfl: 0    polyethylene glycol (MIRALAX) 17 g packet, Take 17 g by mouth Daily., Disp: 14 each, Rfl: 0    promethazine (PHENERGAN) 12.5 MG tablet, 1-2 q6h prn, Disp: 60 tablet, Rfl: 2    Semaglutide, 2 MG/DOSE, (Ozempic, 2 MG/DOSE,) 8 MG/3ML solution pen-injector, Inject 2 mg under the skin into the appropriate area as directed 1 (One) Time Per Week., Disp: 3 mL, Rfl: 0    traMADol (ULTRAM) 50 MG tablet, Take 1 tablet by mouth Every 8 (Eight) Hours As Needed for Moderate Pain., Disp: 90 tablet, Rfl: 0    Triamcinolone Acetonide  (Nasacort Allergy 24HR) 55 MCG/ACT nasal inhaler, 2 sprays into the nostril(s) as directed by provider Daily., Disp: 16.5 g, Rfl: 2    vitamin D (ERGOCALCIFEROL) 1.25 MG (59108 UT) capsule capsule, Take 1 capsule by mouth Every 7 (Seven) Days., Disp: 12 capsule, Rfl: 1    gabapentin (NEURONTIN) 400 MG capsule, Take 1 capsule by mouth 3 (Three) Times a Day for 60 days., Disp: 90 capsule, Rfl: 1    The following portions of the patient's history were reviewed and updated as appropriate: allergies, current medications, past family history, past medical history, past social history, past surgical history, and problem list.      REVIEW OF PERTINENT MEDICAL DATA    Past Medical History:   Diagnosis Date    Allergic     Arthritis     Cancer     Pancreatitis    Cholelithiasis 2023    Chronic pain disorder     Colon polyp 5years    Diabetes mellitus     GERD (gastroesophageal reflux disease)     Headache     Headache, tension-type     Histoplasmosis     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    Hyperlipidemia     Hypertension     Irritable bowel syndrome     Joint pain     Low back pain     Migraine     Murmur     Need for influenza vaccination     Obesity     Rib pain     left side    Seasonal allergies      Past Surgical History:   Procedure Laterality Date     SECTION      CHOLECYSTECTOMY N/A 10/27/2023    Procedure: CHOLECYSTECTOMY LAPAROSCOPIC WITH [employer] ROBOT;  Surgeon: Mario Alexander MD;  Location: [facility] MAIN OR;  Service: Robotics - [facility];  Laterality: N/A;    COLONOSCOPY      JOINT REPLACEMENT  2023    Left shoulder    LUNG BIOPSY      ORTHOPEDIC SURGERY      SUBTOTAL HYSTERECTOMY      TONSILLECTOMY      TOTAL SHOULDER ARTHROPLASTY W/ DISTAL CLAVICLE EXCISION Left 02/15/2023    Procedure: TOTAL SHOULDER REVERSE ARTHROPLASTY;  Surgeon: Willian Gomez MD;  Location: [facility] MAIN OR;  Service: Orthopedics;  Laterality: Left;    UPPER ENDOSCOPIC ULTRASOUND W/ FNA N/A 2024    Procedure: ENDOSCOPIC  ULTRASOUND WITH FINE NEEDLE ASPIRATION;  Surgeon: Arleth Blood MD;  Location: [facility] ENDOSCOPY;  Service: Gastroenterology;  Laterality: N/A;  post: pancreatic body mass and hiatal hernia     Family History   Problem Relation Age of Onset    Hypertension Mother     Heart disease Mother     Arthritis Mother     Cancer Mother     Thyroid disease Mother     Diabetes Father     Hyperlipidemia Father     No Known Problems Sister     No Known Problems Sister     No Known Problems Sister     No Known Problems Brother     No Known Problems Brother     No Known Problems Brother     No Known Problems Brother     No Known Problems Son     Migraines Sister      Social History     Socioeconomic History    Marital status:    Tobacco Use    Smoking status: Every Day     Current packs/day: 1.00     Average packs/day: 1 pack/day for 5.0 years (5.0 ttl pk-yrs)     Types: Cigarettes     Passive exposure: Current    Smokeless tobacco: Never   Vaping Use    Vaping status: Never Used   Substance and Sexual Activity    Alcohol use: Never    Drug use: Never    Sexual activity: Yes     Partners: Male     Birth control/protection: None         Review of Systems   Musculoskeletal:  Positive for arthralgias, back pain and gait problem.         Vitals:    10/22/24 0914   BP: 124/85   Pulse: 80   Resp: 16   SpO2: 97%   Weight: 90.7 kg (200 lb)   PainSc:   6         Objective   Physical Exam  Musculoskeletal:         General: Tenderness present.        Arms:         Legs:            Imaging Reviewed:    Thoracic x-ray-9/4/2024  - Mild degenerative changes of thoracic spine and diminished disc space at T10-11 and T11-12    MRI abdomen-6/2024  - Tiny cystic lesion in the pancreatic tail without suspicious enhancement or other definable enhancing lesion in patient with history of biopsy-proven well-differentiated pancreatic neuroendocrine tumor.  Small cystic neuroendocrine tumor or sidebranch IPMN can have this appearance.  Recommend  continued surveillance.    MRI pelvis- 2021:   1.High-grade partial to full-thickness tear of the conjoined tendon of the left hamstring origin with some preservation of the semimembranosus contribution.   2.Associated left ischiofemoral impingement with intramuscular edema and partial thickness tearing of the quadratus femoris musculature and concomitant adventitious bursitis.   3.Large lipoma arising from the left piriformis musculature measuring up to 9.3 cm, which extends through the greater sciatic notch into the left hemipelvis. Associated mild posterior displacement of the left sciatic nerve with preserved nerve signal and morphology.   4.Mild degenerative change of the hips bilaterally.         Assessment:    1. Piriformis syndrome of left side    2. Tear of left hamstring, subsequent encounter    3. Malignant neoplasm of tail of pancreas    4. Mid back pain on left side         Plan:   1. Defer UDS for now.   2.  Gabapentin currently is providing some relief without significant side effects.  Recommend increasing Gabapentin 400 mg TID. Side effects discussed with the patient including but not limited to somnolence, dizziness, ataxia, headache, fatigue, blurred vision, cold or flu-like symptoms,delusions, hoarseness, lack or loss of strength, lower back or side pain, swelling of the hands, feet, or lower legs trembling or shaking. Patient understands and agrees with the plan.  3.  Patient has left-sided chest pain radiating to left breast and left mid back area.  Thoracic x-ray was reviewed along with MRI abdomen.  Patient does have history of pancreatic tail cancer which can certainly cause this referred pain in left chest and mid back area.  Will order thoracic MRI to rule out any other pathology originating from the thoracic spine.  Discussed with patient that we may consider diagnostic and therapeutic left-sided celiac plexus block to help control her pain after reviewing MRI.  4.  Discussed with patient  that her left-sided hip/back pain is multifactorial in nature with history of full-thickness tear of hamstring and having large lipoma from left piriformis muscle.  She does have some symptoms of left piriformis syndrome and may benefit from left piriformis injection. Patient has pain in the left buttock area with referred pain in the leg. Discussed left piriformis injection. Discussed the possibility of infection, bleeding, nerve damage, headache, increased pain, paraplegia. Patient understands and agrees.       RTC for injection and then 3 week follow up.     Vadim Carlisle DO  Pain Management   [facility]         INSPECT REPORT    As part of the patient's treatment plan, I may be prescribing controlled substances. The patient has been made aware of appropriate use of such medications, including potential risk of somnolence, limited ability to drive and/or work safely, and the potential for dependence or overdose. It has also been made clear that these medications are for use by this patient only, without concomitant use of alcohol or other substances unless prescribed.     Patient has completed prescribing agreement detailing terms of continued prescribing of controlled substances, including monitoring INSPECT reports, urine drug screening, and pill counts if necessary. The patient is aware that inappropriate use will results in cessation of prescribing such medications.    INSPECT report has been reviewed and scanned into the patient's chart.      EMR Dragon/Transcription Disclaimer:   Much of this encounter note is an electronic transcription/translation of spoken language to printed text. The electronic translation of spoken language may permit erroneous, or at times, nonsensical words or phrases to be inadvertently transcribed; Although I have reviewed the note for such errors, some may still exist.

## 2024-10-22 ENCOUNTER — OFFICE VISIT (OUTPATIENT)
Dept: PAIN MEDICINE | Facility: CLINIC | Age: 64
End: 2024-10-22
Payer: COMMERCIAL

## 2024-10-22 VITALS
BODY MASS INDEX: 33.28 KG/M2 | WEIGHT: 200 LBS | HEART RATE: 80 BPM | DIASTOLIC BLOOD PRESSURE: 85 MMHG | SYSTOLIC BLOOD PRESSURE: 124 MMHG | RESPIRATION RATE: 16 BRPM | OXYGEN SATURATION: 97 %

## 2024-10-22 DIAGNOSIS — C25.2 MALIGNANT NEOPLASM OF TAIL OF PANCREAS: ICD-10-CM

## 2024-10-22 DIAGNOSIS — S76.312D TEAR OF LEFT HAMSTRING, SUBSEQUENT ENCOUNTER: ICD-10-CM

## 2024-10-22 DIAGNOSIS — M54.9 MID BACK PAIN ON LEFT SIDE: ICD-10-CM

## 2024-10-22 DIAGNOSIS — G57.02 PIRIFORMIS SYNDROME OF LEFT SIDE: Primary | ICD-10-CM

## 2024-10-22 PROCEDURE — 99204 OFFICE O/P NEW MOD 45 MIN: CPT | Performed by: STUDENT IN AN ORGANIZED HEALTH CARE EDUCATION/TRAINING PROGRAM

## 2024-10-22 RX ORDER — GABAPENTIN 400 MG/1
400 CAPSULE ORAL 3 TIMES DAILY
Qty: 90 CAPSULE | Refills: 1 | Status: SHIPPED | OUTPATIENT
Start: 2024-10-22 | End: 2024-12-21

## 2024-10-31 ENCOUNTER — TRANSCRIBE ORDERS (OUTPATIENT)
Dept: ADMINISTRATIVE | Facility: HOSPITAL | Age: 64
End: 2024-10-31
Payer: COMMERCIAL

## 2024-10-31 DIAGNOSIS — D3A.8 NEUROENDOCRINE TUMOR OF PANCREAS: Primary | ICD-10-CM

## 2024-11-07 ENCOUNTER — TELEPHONE (OUTPATIENT)
Dept: FAMILY MEDICINE CLINIC | Facility: CLINIC | Age: 64
End: 2024-11-07

## 2024-11-07 DIAGNOSIS — F43.21 COMPLICATED GRIEVING: Primary | ICD-10-CM

## 2024-11-07 RX ORDER — SEMAGLUTIDE 2.68 MG/ML
2 INJECTION, SOLUTION SUBCUTANEOUS WEEKLY
Qty: 3 ML | Refills: 1 | Status: SHIPPED | OUTPATIENT
Start: 2024-11-07

## 2024-11-07 RX ORDER — ALPRAZOLAM 0.25 MG/1
0.25 TABLET ORAL 2 TIMES DAILY PRN
Qty: 30 TABLET | Refills: 0 | Status: SHIPPED | OUTPATIENT
Start: 2024-11-07

## 2024-11-07 NOTE — TELEPHONE ENCOUNTER
Caller: Judy Koehler    Relationship: Self    Best call back number: [phone]        What was the call regarding: PATIENT IS NEEDING REFILL ON Semaglutide, 2 MG/DOSE, (Ozempic, 2 MG/DOSE,) 8 MG/3ML solution pen-injector     PATIENT STATES [facility] NEEDS NEW PRESCRIPTION    PATIENT IS DUE FOR SHOT TODAY     PLEASE GIVE CALLBACK

## 2024-11-07 NOTE — TELEPHONE ENCOUNTER
Caller: Judy Koehler    Relationship: Self    Best call back number: [phone]    What medication are you requesting: PATIENT IS NEEDING SOMETHING TO HELP HER SLEEP PATIENT  PASSED AWAY TWO DAYS AGO     JUST WHATEVER ROSIE RECOMMENDS       If a prescription is needed, what is your preferred pharmacy and phone number:  [facility] [address] [facility], [place] - [address] [facility] [phone] [place] [phone]

## 2024-11-11 ENCOUNTER — TELEPHONE (OUTPATIENT)
Dept: PAIN MEDICINE | Facility: CLINIC | Age: 64
End: 2024-11-11
Payer: COMMERCIAL

## 2024-11-11 NOTE — TELEPHONE ENCOUNTER
Caller: Juyd Koehler    Relationship: Self    Best call back number:     What is the best time to reach you: ANY     Who are you requesting to speak with (clinical staff, provider,  specific staff member): CLINICAL    What was the call regarding: PATIENT CALLED TO SEE IF THEY WILL NEED A  FOR THURSDAYS PROCEDURE    Is it okay if the provider responds through [employer]: CAN MESSAGE THRU [employer]

## 2024-11-12 ENCOUNTER — TELEPHONE (OUTPATIENT)
Dept: FAMILY MEDICINE CLINIC | Facility: CLINIC | Age: 64
End: 2024-11-12
Payer: COMMERCIAL

## 2024-11-12 DIAGNOSIS — N39.0 URINARY TRACT INFECTION WITHOUT HEMATURIA, SITE UNSPECIFIED: Primary | ICD-10-CM

## 2024-11-12 NOTE — TELEPHONE ENCOUNTER
Pt here. States that she was to have a f/u urine.  I don't have a order for the repeat culture?  Can you enter an order?

## 2024-11-14 ENCOUNTER — HOSPITAL ENCOUNTER (OUTPATIENT)
Dept: PAIN MEDICINE | Facility: HOSPITAL | Age: 64
Discharge: HOME | End: 2024-11-14
Payer: COMMERCIAL

## 2024-11-14 ENCOUNTER — HOSPITAL ENCOUNTER (OUTPATIENT)
Dept: MRI IMAGING | Facility: HOSPITAL | Age: 64
Discharge: HOME | End: 2024-11-14
Payer: COMMERCIAL

## 2024-11-14 VITALS
HEART RATE: 85 BPM | WEIGHT: 200 LBS | HEIGHT: 65 IN | DIASTOLIC BLOOD PRESSURE: 80 MMHG | OXYGEN SATURATION: 97 % | RESPIRATION RATE: 16 BRPM | TEMPERATURE: 96.9 F | BODY MASS INDEX: 33.32 KG/M2 | SYSTOLIC BLOOD PRESSURE: 113 MMHG

## 2024-11-14 DIAGNOSIS — M54.9 MID BACK PAIN ON LEFT SIDE: ICD-10-CM

## 2024-11-14 DIAGNOSIS — R52 PAIN: ICD-10-CM

## 2024-11-14 DIAGNOSIS — G57.02 PIRIFORMIS SYNDROME OF LEFT SIDE: Primary | ICD-10-CM

## 2024-11-14 PROCEDURE — 25010000002 LIDOCAINE PF 1% 1 % SOLUTION: Performed by: STUDENT IN AN ORGANIZED HEALTH CARE EDUCATION/TRAINING PROGRAM

## 2024-11-14 PROCEDURE — 25010000002 METHYLPREDNISOLONE PER 40 MG: Performed by: STUDENT IN AN ORGANIZED HEALTH CARE EDUCATION/TRAINING PROGRAM

## 2024-11-14 PROCEDURE — 77003 FLUOROGUIDE FOR SPINE INJECT: CPT

## 2024-11-14 PROCEDURE — 72146 MRI CHEST SPINE W/O DYE: CPT

## 2024-11-14 PROCEDURE — 25010000002 BUPIVACAINE (PF) 0.25 % SOLUTION: Performed by: STUDENT IN AN ORGANIZED HEALTH CARE EDUCATION/TRAINING PROGRAM

## 2024-11-14 RX ORDER — METHYLPREDNISOLONE ACETATE 40 MG/ML
40 INJECTION, SUSPENSION INTRA-ARTICULAR; INTRALESIONAL; INTRAMUSCULAR; SOFT TISSUE ONCE
Status: COMPLETED | OUTPATIENT
Start: 2024-11-14 | End: 2024-11-14

## 2024-11-14 RX ORDER — BUPIVACAINE HYDROCHLORIDE 2.5 MG/ML
10 INJECTION, SOLUTION EPIDURAL; INFILTRATION; INTRACAUDAL ONCE
Status: COMPLETED | OUTPATIENT
Start: 2024-11-14 | End: 2024-11-14

## 2024-11-14 RX ORDER — LIDOCAINE HYDROCHLORIDE 10 MG/ML
5 INJECTION, SOLUTION EPIDURAL; INFILTRATION; INTRACAUDAL; PERINEURAL ONCE
Status: COMPLETED | OUTPATIENT
Start: 2024-11-14 | End: 2024-11-14

## 2024-11-14 RX ADMIN — METHYLPREDNISOLONE ACETATE 40 MG: 40 INJECTION, SUSPENSION INTRA-ARTICULAR; INTRALESIONAL; INTRAMUSCULAR; INTRASYNOVIAL; SOFT TISSUE at 07:59

## 2024-11-14 RX ADMIN — BUPIVACAINE HYDROCHLORIDE 9 ML: 2.5 INJECTION, SOLUTION EPIDURAL; INFILTRATION; INTRACAUDAL; PERINEURAL at 07:59

## 2024-11-14 RX ADMIN — LIDOCAINE HYDROCHLORIDE 5 ML: 10 INJECTION, SOLUTION EPIDURAL; INFILTRATION; INTRACAUDAL; PERINEURAL at 07:58

## 2024-11-14 NOTE — PROCEDURES
Procedure Performed: LEFT Piriformis injection     Preoperative Diagnosis: Piriformis Syndrome     Postoperative Diagnosis: Same    PROCEDURE NOTE:      Patient was placed in prone position.  Lower back and hip area and left side was prepped with ChloraPrep and draped in usual sterile fashion.  Inferior portion of SI joint was identified along with greater trochanter of left hip.  Line was drawn between these 2 points and point was marked superior portion of acetabulum.  1% lidocaine was used for local anesthesia.  22-gauge 3.5 inch needle was inserted into the piriformis muscle.  Following placement of the needle and negative aspiration, 1 cc of contrast was injected showing piriformis muscle pattern.  9 cc of bupivacaine 0.25% with 40 mg Depo-Medrol was injected.     During the procedure there was no evidence of CSF, paresthesias or heme.    After the procedure the needles were flushed with preservative free local anesthetic and removed. Skin was cleaned and a band aid was applied.    Complications: None    Vadim Carlisle DO  Pain Management   [facility]

## 2024-11-14 NOTE — DISCHARGE INSTRUCTIONS
Piriformis Injection    Discharge Instructions      Piriformis injection is the injection of a long lasting steroid into the piriformis muscle of your buttock.    The piriformis muscle helps your hip rotate and helps to bring your leg back and out. It also helps shift your weight to keep you stable while you are walking. The sciatic nerve runs under or through the piriformis muscle. Damage to the piriformis muscle can cause spasms that put pressure on the nerve below. This causes pain and discomfort while sitting and moving. The pain may feel as if it begins in the buttock and spreads (radiates) down your hip and thigh.    WHAT ARE THE RISK?  Bleeding  Infection  Allergic Reaction  Nerve Damage      FOLLOW THESE INSTRUCTIONS AT HOME:  Keep the band-aid clean and dry for 24 hours. After 24 hours you may remove the band-aid.  You may shower after 24 hours.  No heat to the area for 24 hours.  Return to your normal activities.  5. You may use ice on the injured area if needed.  Put ice in a plastic bag.  Place a towel between your skin and the bag.  Leave the ice on for 20 minutes, 2-3 times a day.     INJECTION SITE        1.  Remove your band-aid after 24 hours        2.  Check your injection site every day for signs of infection.  Check for:    Redness               Bleeding (small amount is normal)               Warmth               Pus or bad odor        3. Some numbness may be experienced for several hours following the procedure.        4. Do not take baths, swim or use a hot tub for 24 hours.    ACTIVITY        1. If your leg is numb, no driving until full sensation and strength has returned.        2. Use caution walking until full sensation returns to your leg.    GENERAL INSTRUCTIONS         1. If you have numbness or weakness in your leg, use caution when walking until full sensation              Returns to your leg.         2. It is not uncommon to notice an increase in discomfort or a change in the  location of discomfort              for 3-4 days after the procedure.           3. Take the pain medicine your physician has prescribed or over the counter pain relievers as              long as you do not have any contraindications.         4. If you are a diabetic, monitor your blood sugar closely.  The steroids used in your procedure              may increase your blood sugar level up to 36 hours after the injection.  If your blood              sugar is greater than 250, call the physician that helps you monitor your blood sugar.         5. Keep all follow-up visits as scheduled by your health care provider.  This is important.      CONTACT OUR OFFICE IF:         1. You develop any signs or symptoms of infection as listed above.         2. If you have any questions or concern.           PAIN MANAGEMENT CENTER HOURS:  Monday-Friday 7:30-4:00 pm.   For any problem related to your procedure we can be reached at [phone]  If you experience an emergency with your procedure, call [phone] or go to the emergency room.                     This information is not intended to replace advice given to you by your health care provider. Make sure you discuss any questions you have with your health care provider.

## 2024-11-14 NOTE — H&P
H and P reviewed from previous visit and no changes to patient's clinical presentation. Will proceed with procedure as planned. Hx of DM-2.     Vadim Carlisle DO  Pain Management   [facility]

## 2024-11-15 ENCOUNTER — TELEPHONE (OUTPATIENT)
Dept: PAIN MEDICINE | Facility: HOSPITAL | Age: 64
End: 2024-11-15
Payer: COMMERCIAL

## 2024-11-15 LAB
BACTERIA UR CULT: ABNORMAL
BACTERIA UR CULT: ABNORMAL
OTHER ANTIBIOTIC SUSC ISLT: ABNORMAL

## 2024-11-15 NOTE — TELEPHONE ENCOUNTER
Post procedure phone call completed.  Pt states they are doing good and denies questions or concerns.  Patient reports pain level a #3

## 2024-11-18 ENCOUNTER — TELEPHONE (OUTPATIENT)
Dept: FAMILY MEDICINE CLINIC | Facility: CLINIC | Age: 64
End: 2024-11-18
Payer: COMMERCIAL

## 2024-11-19 RX ORDER — SULFAMETHOXAZOLE AND TRIMETHOPRIM 800; 160 MG/1; MG/1
1 TABLET ORAL 2 TIMES DAILY
Qty: 14 TABLET | Refills: 0 | Status: SHIPPED | OUTPATIENT
Start: 2024-11-19 | End: 2024-11-26

## 2024-11-19 NOTE — TELEPHONE ENCOUNTER
PATIENT DID DO FOLLOW UP URINE WHICH IS STILL ABNORMAL, AND I HAVE FORWARDED THAT TO YOU. SO PLEASE ADVISE

## 2024-11-30 DIAGNOSIS — S76.312D TEAR OF LEFT HAMSTRING, SUBSEQUENT ENCOUNTER: ICD-10-CM

## 2024-11-30 DIAGNOSIS — C25.2 MALIGNANT NEOPLASM OF TAIL OF PANCREAS: ICD-10-CM

## 2024-11-30 DIAGNOSIS — G57.02 PIRIFORMIS SYNDROME OF LEFT SIDE: ICD-10-CM

## 2024-11-30 DIAGNOSIS — M54.9 MID BACK PAIN ON LEFT SIDE: ICD-10-CM

## 2024-12-02 RX ORDER — GABAPENTIN 400 MG/1
CAPSULE ORAL
Qty: 90 CAPSULE | Refills: 0 | OUTPATIENT
Start: 2024-12-02

## 2024-12-09 ENCOUNTER — HOSPITAL ENCOUNTER (OUTPATIENT)
Dept: MRI IMAGING | Facility: HOSPITAL | Age: 64
Discharge: HOME | End: 2024-12-09
Admitting: SURGERY
Payer: COMMERCIAL

## 2024-12-09 DIAGNOSIS — D3A.8 NEUROENDOCRINE TUMOR OF PANCREAS: ICD-10-CM

## 2024-12-09 LAB
CREAT BLDA-MCNC: 1 MG/DL (ref 0.6–1.3)
EGFRCR SERPLBLD CKD-EPI 2021: 63 ML/MIN/1.73

## 2024-12-09 PROCEDURE — 25010000002 GADOTERIDOL PER 1 ML: Performed by: SURGERY

## 2024-12-09 PROCEDURE — A9579 GAD-BASE MR CONTRAST NOS,1ML: HCPCS | Performed by: SURGERY

## 2024-12-09 PROCEDURE — 82565 ASSAY OF CREATININE: CPT

## 2024-12-09 PROCEDURE — 74183 MRI ABD W/O CNTR FLWD CNTR: CPT

## 2024-12-09 RX ADMIN — GADOTERIDOL 20 ML: 279.3 INJECTION, SOLUTION INTRAVENOUS at 10:27

## 2024-12-09 NOTE — PROGRESS NOTES
Subjective   Judy Koehler is a 64 y.o. female is here for follow up for left-sided rib pain and left hip pain. Patient was last seen for left piriformis injection with some relief overall.  Her left-sided rib pain has decreased slightly since last visit.  Thoracic MRI has been done since last visit and was reviewed by me today.    On last visit:     Mid left sided rib pain is 4/10 on VAS, at maximum is 7/10. Pain is soreness, sharp, stabbing in nature. Pain is referred left breast, mid back. The pain is constant. The pain is improved by sitting on couch with heating pad, some pain relief with muscle relaxants and gabapentin. The pain is worse with house work, increased activities, lifting. Trouble sleeping due to pain     Left hip pain is 3/10  on VAS. Hx of hamstring tear 2021. She has seen ortho who recommended conservative management. Worse with sitting for too long or walking too long.        Previous Injection:   11/14/2024-left piriformis injection-60% pain relief overall.    Hx: Referred by  Debbie Phillips APRN for mid back pain.Mid back pain started about 6 moths ago and L hip pain started about 3 years ago. L hip pain started after a fall.        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     Current Medications:   Tramadol  Flexeril 10 mg  Tylenol  Gabapentin 300 mg BID        Past Medications:     Past Modalities:  TENS:                                                                          no                                                  Physical Therapy Within The Last 6 Months               no  Psychotherapy                                                            no  Massage Therapy                                                       no     Patient Complains Of:  Uro-Fecal Incontinence          no  Weight Gain/Loss                   no  Fever/Chills                             no  Weakness                               no        PEG Assessment   What number best describes your pain on average in the past week?6  What number best describes how, during the past week, pain has interfered with your enjoyment of life?6  What number best describes how, during the past week, pain has interfered with your general activity?  6    Current Outpatient Medications:     acetaminophen (TYLENOL) 325 MG tablet, Take 2 tablets by mouth Every 6 (Six) Hours As Needed for Mild Pain., Disp: , Rfl:     ALPRAZolam (XANAX) 0.25 MG tablet, Take 1 tablet by mouth 2 (Two) Times a Day As Needed for Anxiety., Disp: 30 tablet, Rfl: 0    atorvastatin (LIPITOR) 20 MG tablet, Take 1 tablet by mouth Daily., Disp: 90 tablet, Rfl: 1    B-D UF III MINI PEN NEEDLES 31G X 5 MM misc, USE AS DIRECTED, Disp: 100 each, Rfl: 6    Blood Glucose Monitoring Suppl (Blood Glucose Monitor System) w/Device kit, Check blood sugars tid, Disp: 1 each, Rfl: 0    cephalexin (Keflex) 500 MG capsule, Take 1 capsule by mouth 2 (Two) Times a Day., Disp: 14 capsule, Rfl: 0    cetirizine (zyrTEC) 10 MG tablet, Take 1 tablet by mouth Daily., Disp: 90 tablet, Rfl: 3    Cyanocobalamin (B-12) 1000 MCG capsule, Take  by mouth., Disp: , Rfl:     cyclobenzaprine (FLEXERIL) 10 MG tablet, TAKE 1 TABLET BY MOUTH 3 TIMES A DAY AS NEEDED FOR MUSCLE SPASMS., Disp: 90 tablet, Rfl: 1    Diclofenac Sodium (VOLTAREN) 1 % gel gel, Apply 4 g topically to the appropriate area as directed 4 (Four) Times a Day As Needed (muscle pain)., Disp: 50 g, Rfl: 6    dicyclomine (BENTYL) 20 MG tablet, Take 1 tablet by mouth 3 (Three) Times a Day., Disp: , Rfl:     diphenhydrAMINE HCl  (BENADRYL ALLERGY PO), Take  by mouth., Disp: , Rfl:     gabapentin (NEURONTIN) 400 MG capsule, Take 1 capsule by mouth 3 (Three) Times a Day for 90 days., Disp: 90 capsule, Rfl: 2    glimepiride (Amaryl) 2 MG tablet, Take 1 tablet by mouth Every Morning Before Breakfast., Disp: 90 tablet, Rfl: 1    glucose blood test strip, 1 each by Other route 3 times a day. Use as instructed, Disp: 50 each, Rfl: 11    hydrOXYzine (ATARAX) 50 MG tablet, TAKE 1/2 TO 2 TABLETS BY MOUTH AS NEEDED FOR ANXIETY OR  INSOMNIA., Disp: 180 tablet, Rfl: 0    lisinopril (PRINIVIL,ZESTRIL) 20 MG tablet, Take 1 tablet by mouth Daily., Disp: 90 tablet, Rfl: 3    Magnesium 400 MG tablet, Take 400 mg by mouth 2 (Two) Times a Day., Disp: 60 tablet, Rfl: 3    naloxone (NARCAN) 4 MG/0.1ML nasal spray, 1 spray into the nostril(s) as directed by provider As Needed (per overdose)., Disp: 2 each, Rfl: 2    pantoprazole (PROTONIX) 40 MG EC tablet, Take 1 tablet by mouth Daily., Disp: 90 tablet, Rfl: 3    phenazopyridine (Pyridium) 100 MG tablet, Take 1 tablet by mouth 3 (Three) Times a Day As Needed for Bladder Spasms., Disp: 6 tablet, Rfl: 0    polyethylene glycol (MIRALAX) 17 g packet, Take 17 g by mouth Daily., Disp: 14 each, Rfl: 0    promethazine (PHENERGAN) 12.5 MG tablet, 1-2 q6h prn, Disp: 60 tablet, Rfl: 2    Semaglutide, 2 MG/DOSE, (Ozempic, 2 MG/DOSE,) 8 MG/3ML solution pen-injector, INJECT 2 MG SUBCUTANEOUSLY ONCE A WEEK, Disp: 3 mL, Rfl: 1    traMADol (ULTRAM) 50 MG tablet, Take 1 tablet by mouth Every 8 (Eight) Hours As Needed for Moderate Pain., Disp: 90 tablet, Rfl: 0    Triamcinolone Acetonide (Nasacort Allergy 24HR) 55 MCG/ACT nasal inhaler, 2 sprays into the nostril(s) as directed by provider Daily., Disp: 16.5 g, Rfl: 2    vitamin D (ERGOCALCIFEROL) 1.25 MG (10607 UT) capsule capsule, Take 1 capsule by mouth Every 7 (Seven) Days., Disp: 12 capsule, Rfl: 1    The following portions of the patient's history were reviewed and updated as  appropriate: allergies, current medications, past family history, past medical history, past social history, past surgical history, and problem list.      REVIEW OF PERTINENT MEDICAL DATA    Past Medical History:   Diagnosis Date    Allergic     Arthritis     Cancer     Pancreatitis    Cholelithiasis 2023    Chronic pain disorder     Colon polyp 5years    Diabetes mellitus     GERD (gastroesophageal reflux disease)     Headache     Headache, tension-type     Histoplasmosis     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    Hyperlipidemia     Hypertension     Irritable bowel syndrome     Joint pain     Low back pain     Migraine     Murmur     Need for influenza vaccination     Obesity     Rib pain     left side    Seasonal allergies      Past Surgical History:   Procedure Laterality Date     SECTION      CHOLECYSTECTOMY N/A 10/27/2023    Procedure: CHOLECYSTECTOMY LAPAROSCOPIC WITH DAVINCI ROBOT;  Surgeon: Mario Alexander MD;  Location: [facility] MAIN OR;  Service: Robotics - DaVinci;  Laterality: N/A;    COLONOSCOPY      JOINT REPLACEMENT  2023    Left shoulder    LUNG BIOPSY      ORTHOPEDIC SURGERY      SUBTOTAL HYSTERECTOMY      TONSILLECTOMY      TOTAL SHOULDER ARTHROPLASTY W/ DISTAL CLAVICLE EXCISION Left 02/15/2023    Procedure: TOTAL SHOULDER REVERSE ARTHROPLASTY;  Surgeon: Willian Gomez MD;  Location: [facility] MAIN OR;  Service: Orthopedics;  Laterality: Left;    UPPER ENDOSCOPIC ULTRASOUND W/ FNA N/A 2024    Procedure: ENDOSCOPIC ULTRASOUND WITH FINE NEEDLE ASPIRATION;  Surgeon: Arleth Blood MD;  Location: [facility] ENDOSCOPY;  Service: Gastroenterology;  Laterality: N/A;  post: pancreatic body mass and hiatal hernia     Family History   Problem Relation Age of Onset    Hypertension Mother     Heart disease Mother     Arthritis Mother     Cancer Mother     Thyroid disease Mother     Diabetes Father     Hyperlipidemia Father     No Known Problems Sister     No Known Problems Sister     No Known Problems  Sister     No Known Problems Brother     No Known Problems Brother     No Known Problems Brother     No Known Problems Brother     No Known Problems Son     Migraines Sister      Social History     Socioeconomic History    Marital status:    Tobacco Use    Smoking status: Every Day     Current packs/day: 1.00     Average packs/day: 1 pack/day for 5.0 years (5.0 ttl pk-yrs)     Types: Cigarettes     Passive exposure: Current    Smokeless tobacco: Never   Vaping Use    Vaping status: Never Used   Substance and Sexual Activity    Alcohol use: Never    Drug use: Never    Sexual activity: Not Currently     Partners: Male     Birth control/protection: None         Review of Systems   Musculoskeletal:  Positive for arthralgias, back pain and gait problem.         Vitals:    12/10/24 1033   BP: 120/74   Pulse: 99   Resp: 16   SpO2: 97%   Weight: 90.7 kg (200 lb)   PainSc:   5         Objective   Physical Exam  Musculoskeletal:         General: Tenderness present.        Arms:         Legs:            Imaging Reviewed:    MRI thoracic spine without contrast-11/14/2024  - Minimal Modic type I endplate changes anteriorly at T6-T7  - No significant disc herniation, canal stenosis or neuroforaminal narrowing    Thoracic x-ray-9/4/2024  - Mild degenerative changes of thoracic spine and diminished disc space at T10-11 and T11-12     MRI abdomen-6/2024  - Tiny cystic lesion in the pancreatic tail without suspicious enhancement or other definable enhancing lesion in patient with history of biopsy-proven well-differentiated pancreatic neuroendocrine tumor.  Small cystic neuroendocrine tumor or sidebranch IPMN can have this appearance.  Recommend continued surveillance.     MRI pelvis- 2021:   1.High-grade partial to full-thickness tear of the conjoined tendon of the left hamstring origin with some preservation of the semimembranosus contribution.   2.Associated left ischiofemoral impingement with intramuscular edema and  partial thickness tearing of the quadratus femoris musculature and concomitant adventitious bursitis.   3.Large lipoma arising from the left piriformis musculature measuring up to 9.3 cm, which extends through the greater sciatic notch into the left hemipelvis. Associated mild posterior displacement of the left sciatic nerve with preserved nerve signal and morphology.   4.Mild degenerative change of the hips bilaterally.     Assessment:    1. High risk medication use    2. Piriformis syndrome of left side    3. Tear of left hamstring, subsequent encounter    4. Malignant neoplasm of tail of pancreas    5. Mid back pain on left side    6. Osteoarthritis of left shoulder, unspecified osteoarthritis type    7. Pain medication agreement         Plan:   1. UDS to be done as part on initial patient evaluation. Patient is on opioids and UDS should be positive for opioids prescribed and negative for medications not prescribed and any illicit drugs including marijuana to be considered for long term opioid therapy, repeat UDS will be done randomly for compliance with the medication prescribed.  UDS done on 12/10/2024.  2.  Gabapentin currently is providing some relief without significant side effects.  Continue gabapentin 400 mg TID. Side effects discussed with the patient including but not limited to somnolence, dizziness, ataxia, headache, fatigue, blurred vision, cold or flu-like symptoms,delusions, hoarseness, lack or loss of strength, lower back or side pain, swelling of the hands, feet, or lower legs trembling or shaking. Patient understands and agrees with the plan.  3.  Patient has left-sided chest pain radiating to left breast and left mid back area.  Thoracic x-ray was reviewed along with MRI abdomen.  Patient does have history of pancreatic tail cancer which can certainly cause this referred pain in left chest and mid back area.   Discussed with patient that we may consider diagnostic and therapeutic left-sided  celiac plexus block to help control her pain.  Hold off as pain has improved.  4.  Discussed with patient that her left-sided hip/back pain is multifactorial in nature with history of full-thickness tear of hamstring and having large lipoma from left piriformis muscle.  She does have some symptoms of left piriformis syndrome and may benefit from left piriformis injection. Patient has pain in the left buttock area with referred pain in the leg.  Some relief with left-sided piriformis injection.  May consider repeating in future if needed.  5.  She has been taking intermittent tramadol from PCP and request prescription of tramadol.  Will take over tramadol 50 mg as needed-90 tablets sent on 12/10/2024. Side effects discussed including but not limited to nausea, vomiting, constipation, lightheadedness, dizziness, drowsiness, or headache. This medication may increase serotonin and rarely cause a very serious condition called serotonin syndrome/ toxicity.   6. Patient counseled on FDA black box warning of increased risk for morbidity and mortality with concomittent use of benzodiazepine medications and opioid medications. Patient counseled not to take these medications within 6 hours of each other.       This in 3 months.     Vadim Carlisle DO  Pain Management   [facility]       INSPECT REPORT    As part of the patient's treatment plan, I may be prescribing controlled substances. The patient has been made aware of appropriate use of such medications, including potential risk of somnolence, limited ability to drive and/or work safely, and the potential for dependence or overdose. It has also been made clear that these medications are for use by this patient only, without concomitant use of alcohol or other substances unless prescribed.     Patient has completed prescribing agreement detailing terms of continued prescribing of controlled substances, including monitoring INSPECT reports, urine drug screening, and pill  counts if necessary. The patient is aware that inappropriate use will results in cessation of prescribing such medications.    INSPECT report has been reviewed and scanned into the patient's chart.

## 2024-12-10 ENCOUNTER — OFFICE VISIT (OUTPATIENT)
Dept: PAIN MEDICINE | Facility: CLINIC | Age: 64
End: 2024-12-10
Payer: COMMERCIAL

## 2024-12-10 VITALS
BODY MASS INDEX: 33.28 KG/M2 | WEIGHT: 200 LBS | SYSTOLIC BLOOD PRESSURE: 120 MMHG | HEART RATE: 99 BPM | OXYGEN SATURATION: 97 % | DIASTOLIC BLOOD PRESSURE: 74 MMHG | RESPIRATION RATE: 16 BRPM

## 2024-12-10 DIAGNOSIS — M54.9 MID BACK PAIN ON LEFT SIDE: ICD-10-CM

## 2024-12-10 DIAGNOSIS — Z79.899 HIGH RISK MEDICATION USE: Primary | ICD-10-CM

## 2024-12-10 DIAGNOSIS — S76.312D TEAR OF LEFT HAMSTRING, SUBSEQUENT ENCOUNTER: ICD-10-CM

## 2024-12-10 DIAGNOSIS — M19.012 OSTEOARTHRITIS OF LEFT SHOULDER, UNSPECIFIED OSTEOARTHRITIS TYPE: ICD-10-CM

## 2024-12-10 DIAGNOSIS — G57.02 PIRIFORMIS SYNDROME OF LEFT SIDE: ICD-10-CM

## 2024-12-10 DIAGNOSIS — C25.2 MALIGNANT NEOPLASM OF TAIL OF PANCREAS: ICD-10-CM

## 2024-12-10 DIAGNOSIS — Z02.89 PAIN MEDICATION AGREEMENT: ICD-10-CM

## 2024-12-10 RX ORDER — TRAMADOL HYDROCHLORIDE 50 MG/1
50 TABLET ORAL EVERY 8 HOURS PRN
Qty: 90 TABLET | Refills: 0 | Status: SHIPPED | OUTPATIENT
Start: 2024-12-10

## 2024-12-10 RX ORDER — GABAPENTIN 400 MG/1
400 CAPSULE ORAL 3 TIMES DAILY
Qty: 90 CAPSULE | Refills: 2 | Status: SHIPPED | OUTPATIENT
Start: 2024-12-10 | End: 2025-03-10

## 2024-12-23 RX ORDER — HYDROXYZINE HYDROCHLORIDE 50 MG/1
TABLET, FILM COATED ORAL
Qty: 180 TABLET | Refills: 0 | Status: SHIPPED | OUTPATIENT
Start: 2024-12-23

## 2024-12-23 RX ORDER — SEMAGLUTIDE 2.68 MG/ML
2 INJECTION, SOLUTION SUBCUTANEOUS WEEKLY
Qty: 3 ML | Refills: 0 | Status: SHIPPED | OUTPATIENT
Start: 2024-12-23

## 2024-12-26 RX ORDER — MAGNESIUM OXIDE TAB 400 MG (241.3 MG ELEMENTAL MG) 400 (241.3 MG) MG
1 TAB ORAL 2 TIMES DAILY
Qty: 60 TABLET | Refills: 0 | Status: SHIPPED | OUTPATIENT
Start: 2024-12-26

## 2025-01-20 RX ORDER — SEMAGLUTIDE 2.68 MG/ML
2 INJECTION, SOLUTION SUBCUTANEOUS WEEKLY
Qty: 3 ML | Refills: 0 | Status: SHIPPED | OUTPATIENT
Start: 2025-01-20

## 2025-01-27 RX ORDER — MAGNESIUM OXIDE TAB 400 MG (241.3 MG ELEMENTAL MG) 400 (241.3 MG) MG
1 TAB ORAL 2 TIMES DAILY
Qty: 60 TABLET | Refills: 0 | Status: SHIPPED | OUTPATIENT
Start: 2025-01-27

## 2025-02-03 ENCOUNTER — OFFICE VISIT (OUTPATIENT)
Dept: FAMILY MEDICINE CLINIC | Facility: CLINIC | Age: 65
End: 2025-02-03
Payer: COMMERCIAL

## 2025-02-03 VITALS
HEIGHT: 65 IN | DIASTOLIC BLOOD PRESSURE: 81 MMHG | WEIGHT: 208 LBS | BODY MASS INDEX: 34.66 KG/M2 | HEART RATE: 89 BPM | TEMPERATURE: 97.3 F | OXYGEN SATURATION: 98 % | SYSTOLIC BLOOD PRESSURE: 121 MMHG

## 2025-02-03 DIAGNOSIS — Z72.0 TOBACCO ABUSE: ICD-10-CM

## 2025-02-03 DIAGNOSIS — E78.2 MIXED HYPERLIPIDEMIA: Primary | ICD-10-CM

## 2025-02-03 DIAGNOSIS — E11.9 TYPE 2 DIABETES MELLITUS WITHOUT COMPLICATION, WITHOUT LONG-TERM CURRENT USE OF INSULIN: ICD-10-CM

## 2025-02-03 DIAGNOSIS — F43.21 GRIEVING: ICD-10-CM

## 2025-02-03 DIAGNOSIS — G47.09 OTHER INSOMNIA: ICD-10-CM

## 2025-02-03 DIAGNOSIS — Z00.00 PREVENTATIVE HEALTH CARE: ICD-10-CM

## 2025-02-03 DIAGNOSIS — F43.21 COMPLICATED GRIEVING: ICD-10-CM

## 2025-02-03 DIAGNOSIS — R53.83 OTHER FATIGUE: ICD-10-CM

## 2025-02-03 PROCEDURE — 99214 OFFICE O/P EST MOD 30 MIN: CPT | Performed by: NURSE PRACTITIONER

## 2025-02-03 RX ORDER — CITALOPRAM HYDROBROMIDE 20 MG/1
20 TABLET ORAL DAILY
Qty: 30 TABLET | Refills: 2 | Status: SHIPPED | OUTPATIENT
Start: 2025-02-03

## 2025-02-03 RX ORDER — ALPRAZOLAM 0.25 MG/1
TABLET ORAL
Qty: 30 TABLET | Refills: 0 | Status: SHIPPED | OUTPATIENT
Start: 2025-02-03

## 2025-02-03 NOTE — PROGRESS NOTES
Judy Koehler is a 64 y.o. female.     History of Present Illness  64-year-old white female smoker with history of migraine headaches, GERD, partial hysterectomy, type 2 diabetes, hyperlipidemia, insomnia, IBS encapsulated prostate cancer comes in today for follow-up visit    Blood pressure 120/80 heart rate 88 she denies any chest pain, dyspnea, tachycardia or dizziness    Patient still grieving lost her  in November was tearful during exam.  She is wanting to stay on the Xanax.  I told her I needed to treat her with something that would help her more and placed her on Celexa.  I gave her 1 more month of Xanax but told her she needs to get into some kind of counseling instead of staying home all day since she does not work just feeding her depression.  [facility] told her to [facility] states they have any counseling there.    She still going to gastroenterology she developed a little plain in the left upper quadrant now and she is going to call make an appointment with gastroenterology she did have a recent abdominal MRI    Weight is 208 with a BMI of 34.6.  She has had 4 COVID vaccines up-to-date on influenza needs to schedule an eye exam.  Mammogram is due in August 2025 along with her DEXA scan and she has had a hysterectomy longer does Pap smears.  Her next colonoscopy is due in August 2028         Celexa 20 mg every morning  Xanax nightly for 1 more month  For [facility] try to set up counseling  Try to get out of the house more    Diabetes    Hypertension    Abdominal Pain         The following portions of the patient's history were reviewed and updated as appropriate: allergies, current medications, past family history, past medical history, past social history, past surgical history, and problem list.    Vitals:    02/03/25 1409   BP: 121/81   BP Location: Right arm   Patient Position: Sitting   Cuff Size: Adult   Pulse: 89   Temp: 97.3 °F (36.3 °C)   TempSrc: Infrared   SpO2: 98%   Weight: 94.3  "kg (208 lb)   Height: 165.1 cm (65\")       Past Medical History:   Diagnosis Date    Allergic     Arthritis     Cancer     Pancreatitis    Cholelithiasis 2023    Chronic pain disorder     Colon polyp 5years    Diabetes mellitus     GERD (gastroesophageal reflux disease)     Headache     Headache, tension-type     Histoplasmosis     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    Hyperlipidemia     Hypertension     Irritable bowel syndrome     Joint pain     Low back pain     Migraine     Murmur     Need for influenza vaccination     Obesity     Rib pain     left side    Seasonal allergies      Past Surgical History:   Procedure Laterality Date     SECTION      CHOLECYSTECTOMY N/A 10/27/2023    Procedure: CHOLECYSTECTOMY LAPAROSCOPIC WITH DAVINCI ROBOT;  Surgeon: Mario Alexander MD;  Location: [facility] MAIN OR;  Service: Robotics - DaVinci;  Laterality: N/A;    COLONOSCOPY      JOINT REPLACEMENT  2023    Left shoulder    LUNG BIOPSY      ORTHOPEDIC SURGERY      SUBTOTAL HYSTERECTOMY      TONSILLECTOMY      TOTAL SHOULDER ARTHROPLASTY W/ DISTAL CLAVICLE EXCISION Left 02/15/2023    Procedure: TOTAL SHOULDER REVERSE ARTHROPLASTY;  Surgeon: Willian Gomez MD;  Location: [facility] MAIN OR;  Service: Orthopedics;  Laterality: Left;    UPPER ENDOSCOPIC ULTRASOUND W/ FNA N/A 2024    Procedure: ENDOSCOPIC ULTRASOUND WITH FINE NEEDLE ASPIRATION;  Surgeon: Arleth Blood MD;  Location: [facility] ENDOSCOPY;  Service: Gastroenterology;  Laterality: N/A;  post: pancreatic body mass and hiatal hernia     Family History   Problem Relation Age of Onset    Hypertension Mother     Heart disease Mother     Arthritis Mother     Cancer Mother     Thyroid disease Mother     Diabetes Father     Hyperlipidemia Father     No Known Problems Sister     No Known Problems Sister     No Known Problems Sister     No Known Problems Brother     No Known Problems Brother     No Known Problems Brother     No Known Problems Brother     No Known Problems Son  "    Migraines Sister      Immunization History   Administered Date(s) Administered    COVID-19 (PFIZER) Purple Cap Monovalent 01/08/2021, 01/29/2021    Covid-19 (Pfizer) Gray Cap Monovalent 04/06/2022    Fluzone  >6mos 10/16/2024    Fluzone (or Fluarix & Flulaval for VFC) >6mos 10/11/2017, 09/21/2018, 09/27/2019, 10/02/2020, 10/13/2021, 11/23/2022, 12/12/2023    Influenza, Unspecified 09/27/2019, 10/02/2020, 10/13/2021    Pneumococcal Conjugate 20-Valent (PCV20) 10/16/2024    Pneumococcal Polysaccharide (PPSV23) 12/12/2017    Tdap 07/20/2012, 04/19/2013       Hospital Outpatient Visit on 12/09/2024   Component Date Value Ref Range Status    Creatinine 12/09/2024 1.00  0.60 - 1.30 mg/dL Final    Serial Number: [id number]:  [id number]    eGFR 12/09/2024 63.0  >60.0 mL/min/1.73 Final         Review of Systems   Constitutional: Negative.    HENT: Negative.     Respiratory: Negative.     Cardiovascular: Negative.    Gastrointestinal:  Positive for abdominal pain.   Genitourinary: Negative.    Musculoskeletal: Negative.    Skin: Negative.    Neurological: Negative.    Psychiatric/Behavioral:  Positive for sleep disturbance and depressed mood.        Objective   Physical Exam  Constitutional:       Appearance: Normal appearance.   HENT:      Head: Normocephalic.   Cardiovascular:      Rate and Rhythm: Normal rate and regular rhythm.      Pulses: Normal pulses.      Heart sounds: Normal heart sounds.   Pulmonary:      Effort: Pulmonary effort is normal.      Breath sounds: Normal breath sounds.   Abdominal:      General: Bowel sounds are normal.   Musculoskeletal:         General: Normal range of motion.   Skin:     General: Skin is warm.   Neurological:      General: No focal deficit present.      Mental Status: She is alert and oriented to person, place, and time.   Psychiatric:         Mood and Affect: Mood normal.         Behavior: Behavior normal.      Comments: Patient still glutamine has been but is not doing a lot on  her own to help with that.  She sits at home along a lot so she does not work and not getting out very much.  Is also not tried to get any counseling         Procedures    Assessment & Plan   Diagnoses and all orders for this visit:    1. Mixed hyperlipidemia (Primary)  -     Lipid Panel With LDL / HDL Ratio; Future    2. Type 2 diabetes mellitus without complication, without long-term current use of insulin  -     Comprehensive Metabolic Panel; Future  -     Hemoglobin A1c; Future    3. Preventative health care  -     CBC & Differential; Future    4. Other fatigue  -     TSH+Free T4; Future  -     T3; Future    5. Complicated grieving  -     ALPRAZolam (XANAX) 0.25 MG tablet; One tab at bedtime  Dispense: 30 tablet; Refill: 0    Other orders  -     citalopram (CeleXA) 20 MG tablet; Take 1 tablet by mouth Daily.  Dispense: 30 tablet; Refill: 2           Current Outpatient Medications:     acetaminophen (TYLENOL) 325 MG tablet, Take 2 tablets by mouth Every 6 (Six) Hours As Needed for Mild Pain., Disp: , Rfl:     ALPRAZolam (XANAX) 0.25 MG tablet, One tab at bedtime, Disp: 30 tablet, Rfl: 0    atorvastatin (LIPITOR) 20 MG tablet, Take 1 tablet by mouth Daily., Disp: 90 tablet, Rfl: 1    B-D UF III MINI PEN NEEDLES 31G X 5 MM misc, USE AS DIRECTED, Disp: 100 each, Rfl: 6    Blood Glucose Monitoring Suppl (Blood Glucose Monitor System) w/Device kit, Check blood sugars tid, Disp: 1 each, Rfl: 0    cephalexin (Keflex) 500 MG capsule, Take 1 capsule by mouth 2 (Two) Times a Day., Disp: 14 capsule, Rfl: 0    cetirizine (zyrTEC) 10 MG tablet, Take 1 tablet by mouth Daily., Disp: 90 tablet, Rfl: 3    Cyanocobalamin (B-12) 1000 MCG capsule, Take  by mouth., Disp: , Rfl:     cyclobenzaprine (FLEXERIL) 10 MG tablet, TAKE 1 TABLET BY MOUTH 3 TIMES A DAY AS NEEDED FOR MUSCLE SPASMS., Disp: 90 tablet, Rfl: 1    Diclofenac Sodium (VOLTAREN) 1 % gel gel, Apply 4 g topically to the appropriate area as directed 4 (Four) Times a Day As  Needed (muscle pain)., Disp: 50 g, Rfl: 6    dicyclomine (BENTYL) 20 MG tablet, Take 1 tablet by mouth 3 (Three) Times a Day., Disp: , Rfl:     diphenhydrAMINE HCl (BENADRYL ALLERGY PO), Take  by mouth., Disp: , Rfl:     gabapentin (NEURONTIN) 400 MG capsule, Take 1 capsule by mouth 3 (Three) Times a Day for 90 days., Disp: 90 capsule, Rfl: 2    glimepiride (Amaryl) 2 MG tablet, Take 1 tablet by mouth Every Morning Before Breakfast., Disp: 90 tablet, Rfl: 1    glucose blood test strip, 1 each by Other route 3 times a day. Use as instructed, Disp: 50 each, Rfl: 11    hydrOXYzine (ATARAX) 50 MG tablet, TAKE 1/2 (ONE-HALF) TO TWO TABLETS BY MOUTH AS NEEDED FOR ANXIETY OR  INSOMNIA, Disp: 180 tablet, Rfl: 0    lisinopril (PRINIVIL,ZESTRIL) 20 MG tablet, Take 1 tablet by mouth Daily., Disp: 90 tablet, Rfl: 3    MAGnesium-Oxide 400 (240 Mg) MG tablet, Take 1 tablet by mouth twice daily, Disp: 60 tablet, Rfl: 0    naloxone (NARCAN) 4 MG/0.1ML nasal spray, 1 spray into the nostril(s) as directed by provider As Needed (per overdose)., Disp: 2 each, Rfl: 2    Ozempic, 2 MG/DOSE, 8 MG/3ML solution pen-injector, INJECT 2 MG SUBCUTANEOUSLY ONCE A WEEK, Disp: 3 mL, Rfl: 0    pantoprazole (PROTONIX) 40 MG EC tablet, Take 1 tablet by mouth Daily., Disp: 90 tablet, Rfl: 3    phenazopyridine (Pyridium) 100 MG tablet, Take 1 tablet by mouth 3 (Three) Times a Day As Needed for Bladder Spasms., Disp: 6 tablet, Rfl: 0    polyethylene glycol (MIRALAX) 17 g packet, Take 17 g by mouth Daily., Disp: 14 each, Rfl: 0    promethazine (PHENERGAN) 12.5 MG tablet, 1-2 q6h prn, Disp: 60 tablet, Rfl: 2    traMADol (ULTRAM) 50 MG tablet, Take 1 tablet by mouth Every 8 (Eight) Hours As Needed for Moderate Pain., Disp: 90 tablet, Rfl: 0    Triamcinolone Acetonide (Nasacort Allergy 24HR) 55 MCG/ACT nasal inhaler, 2 sprays into the nostril(s) as directed by provider Daily., Disp: 16.5 g, Rfl: 2    vitamin D (ERGOCALCIFEROL) 1.25 MG (53214 UT) capsule  capsule, Take 1 capsule by mouth Every 7 (Seven) Days., Disp: 12 capsule, Rfl: 1    citalopram (CeleXA) 20 MG tablet, Take 1 tablet by mouth Daily., Disp: 30 tablet, Rfl: 2           Debbie Phillips, APRN 2/3/2025 15:11 EST  This note has been electronically signed

## 2025-02-03 NOTE — PATIENT INSTRUCTIONS
Celexa 20 mg every morning  Xanax nightly for 1 more month  For [facility] try to set up counseling  Try to get out of the house more

## 2025-02-05 LAB
ALBUMIN SERPL-MCNC: 4.1 G/DL (ref 3.9–4.9)
ALP SERPL-CCNC: 128 IU/L (ref 44–121)
ALT SERPL-CCNC: 18 IU/L (ref 0–32)
AST SERPL-CCNC: 17 IU/L (ref 0–40)
BASOPHILS # BLD AUTO: 0 X10E3/UL (ref 0–0.2)
BASOPHILS NFR BLD AUTO: 1 %
BILIRUB SERPL-MCNC: 0.5 MG/DL (ref 0–1.2)
BUN SERPL-MCNC: 15 MG/DL (ref 8–27)
BUN/CREAT SERPL: 15 (ref 12–28)
CALCIUM SERPL-MCNC: 9.4 MG/DL (ref 8.7–10.3)
CHLORIDE SERPL-SCNC: 100 MMOL/L (ref 96–106)
CHOLEST SERPL-MCNC: 123 MG/DL (ref 100–199)
CO2 SERPL-SCNC: 22 MMOL/L (ref 20–29)
CREAT SERPL-MCNC: 0.99 MG/DL (ref 0.57–1)
EGFRCR SERPLBLD CKD-EPI 2021: 64 ML/MIN/1.73
EOSINOPHIL # BLD AUTO: 0.2 X10E3/UL (ref 0–0.4)
EOSINOPHIL NFR BLD AUTO: 2 %
ERYTHROCYTE [DISTWIDTH] IN BLOOD BY AUTOMATED COUNT: 12.9 % (ref 11.7–15.4)
GLOBULIN SER CALC-MCNC: 2.6 G/DL (ref 1.5–4.5)
GLUCOSE SERPL-MCNC: 156 MG/DL (ref 70–99)
HBA1C MFR BLD: 7 % (ref 4.8–5.6)
HCT VFR BLD AUTO: 49 % (ref 34–46.6)
HDLC SERPL-MCNC: 44 MG/DL
HGB BLD-MCNC: 15.7 G/DL (ref 11.1–15.9)
IMM GRANULOCYTES # BLD AUTO: 0 X10E3/UL (ref 0–0.1)
IMM GRANULOCYTES NFR BLD AUTO: 0 %
LDLC SERPL CALC-MCNC: 52 MG/DL (ref 0–99)
LDLC/HDLC SERPL: 1.2 RATIO (ref 0–3.2)
LYMPHOCYTES # BLD AUTO: 3.7 X10E3/UL (ref 0.7–3.1)
LYMPHOCYTES NFR BLD AUTO: 43 %
MCH RBC QN AUTO: 28.1 PG (ref 26.6–33)
MCHC RBC AUTO-ENTMCNC: 32 G/DL (ref 31.5–35.7)
MCV RBC AUTO: 88 FL (ref 79–97)
MONOCYTES # BLD AUTO: 0.4 X10E3/UL (ref 0.1–0.9)
MONOCYTES NFR BLD AUTO: 5 %
NEUTROPHILS # BLD AUTO: 4.4 X10E3/UL (ref 1.4–7)
NEUTROPHILS NFR BLD AUTO: 49 %
PLATELET # BLD AUTO: 283 X10E3/UL (ref 150–450)
POTASSIUM SERPL-SCNC: 5.1 MMOL/L (ref 3.5–5.2)
PROT SERPL-MCNC: 6.7 G/DL (ref 6–8.5)
RBC # BLD AUTO: 5.58 X10E6/UL (ref 3.77–5.28)
SODIUM SERPL-SCNC: 137 MMOL/L (ref 134–144)
T3 SERPL-MCNC: 173 NG/DL (ref 71–180)
T4 FREE SERPL-MCNC: 1.44 NG/DL (ref 0.82–1.77)
TRIGL SERPL-MCNC: 158 MG/DL (ref 0–149)
TSH SERPL DL<=0.005 MIU/L-ACNC: 3.58 UIU/ML (ref 0.45–4.5)
VLDLC SERPL CALC-MCNC: 27 MG/DL (ref 5–40)
WBC # BLD AUTO: 8.7 X10E3/UL (ref 3.4–10.8)

## 2025-02-12 RX ORDER — CETIRIZINE HYDROCHLORIDE 10 MG/1
10 TABLET ORAL DAILY
Qty: 90 TABLET | Refills: 3 | Status: SHIPPED | OUTPATIENT
Start: 2025-02-12

## 2025-02-17 RX ORDER — SEMAGLUTIDE 2.68 MG/ML
2 INJECTION, SOLUTION SUBCUTANEOUS WEEKLY
Qty: 3 ML | Refills: 0 | Status: SHIPPED | OUTPATIENT
Start: 2025-02-17

## 2025-02-25 RX ORDER — MAGNESIUM OXIDE TAB 400 MG (241.3 MG ELEMENTAL MG) 400 (241.3 MG) MG
1 TAB ORAL 2 TIMES DAILY
Qty: 60 TABLET | Refills: 0 | Status: SHIPPED | OUTPATIENT
Start: 2025-02-25

## 2025-03-05 ENCOUNTER — TRANSCRIBE ORDERS (OUTPATIENT)
Dept: ADMINISTRATIVE | Facility: HOSPITAL | Age: 65
End: 2025-03-05
Payer: COMMERCIAL

## 2025-03-05 DIAGNOSIS — R10.12 ABDOMINAL PAIN, LEFT UPPER QUADRANT: Primary | ICD-10-CM

## 2025-03-14 ENCOUNTER — HOSPITAL ENCOUNTER (OUTPATIENT)
Dept: PET IMAGING | Facility: HOSPITAL | Age: 65
Discharge: HOME | End: 2025-03-14
Payer: MEDICARE

## 2025-03-14 DIAGNOSIS — R10.12 ABDOMINAL PAIN, LEFT UPPER QUADRANT: ICD-10-CM

## 2025-03-14 LAB
CREAT BLDA-MCNC: 1.1 MG/DL (ref 0.6–1.3)
EGFRCR SERPLBLD CKD-EPI 2021: 56.2 ML/MIN/1.73

## 2025-03-14 PROCEDURE — 74177 CT ABD & PELVIS W/CONTRAST: CPT

## 2025-03-14 PROCEDURE — 82565 ASSAY OF CREATININE: CPT

## 2025-03-14 PROCEDURE — 25510000001 IOPAMIDOL PER 1 ML: Performed by: NURSE PRACTITIONER

## 2025-03-14 RX ORDER — IOPAMIDOL 755 MG/ML
100 INJECTION, SOLUTION INTRAVASCULAR
Status: COMPLETED | OUTPATIENT
Start: 2025-03-14 | End: 2025-03-14

## 2025-03-14 RX ADMIN — IOPAMIDOL 100 ML: 755 INJECTION, SOLUTION INTRAVENOUS at 12:06

## 2025-03-17 RX ORDER — SEMAGLUTIDE 2.68 MG/ML
2 INJECTION, SOLUTION SUBCUTANEOUS WEEKLY
Qty: 3 ML | Refills: 0 | Status: SHIPPED | OUTPATIENT
Start: 2025-03-17

## 2025-03-17 RX ORDER — GABAPENTIN 400 MG/1
400 CAPSULE ORAL 3 TIMES DAILY
Qty: 90 CAPSULE | Refills: 2 | Status: CANCELLED | OUTPATIENT
Start: 2025-03-17 | End: 2025-06-15

## 2025-03-17 RX ORDER — TRAMADOL HYDROCHLORIDE 50 MG/1
50 TABLET ORAL EVERY 8 HOURS PRN
Qty: 90 TABLET | Refills: 0 | Status: CANCELLED | OUTPATIENT
Start: 2025-03-17

## 2025-03-17 NOTE — PROGRESS NOTES
Subjective   Judy Koehler is a 64 y.o. female is here for follow up for left-sided rib pain and left hip pain.  Patient was last seen on 12/10/2024.  This is a 3 months follow-up. Increased left sided abdominal pain for which GI has ordered CT abdomen.     On last visit:     Mid left sided rib pain is 4/10 on VAS, at maximum is 7/10. Pain is soreness, sharp, stabbing in nature. Pain is referred left breast, mid back. The pain is constant. The pain is improved by sitting on couch with heating pad, some pain relief with muscle relaxants and gabapentin. The pain is worse with house work, increased activities, lifting. Trouble sleeping due to pain     Left hip pain is 3/10  on VAS. Hx of hamstring tear 2021. She has seen ortho who recommended conservative management. Worse with sitting for too long or walking too long.     Left knee pain is 7/10 on VAS. Worse with weight bearing. Improved pain relief with steroid injections last year with Orthopedics.        Previous Injection:   11/14/2024-left piriformis injection-60% pain relief overall.    Hx: Referred by  Debbie Phillips APRN for mid back pain.Mid back pain started about 6 moths ago and L hip pain started about 3 years ago. L hip pain started after a fall.        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     Current Medications:   Tramadol  Flexeril 10 mg  Tylenol  Gabapentin 300 mg BID  Xanax 0.5 mg daily as needed        Past Medications:     Past Modalities:  TENS:                                                                          no                                                   Physical Therapy Within The Last 6 Months              no  Psychotherapy                                                            no  Massage Therapy                                                       no     Patient Complains Of:  Uro-Fecal Incontinence          no  Weight Gain/Loss                   no  Fever/Chills                             no  Weakness                               no        PEG Assessment   What number best describes your pain on average in the past week?6  What number best describes how, during the past week, pain has interfered with your enjoyment of life?6  What number best describes how, during the past week, pain has interfered with your general activity?  6    Current Outpatient Medications:     acetaminophen (TYLENOL) 325 MG tablet, Take 2 tablets by mouth Every 6 (Six) Hours As Needed for Mild Pain., Disp: , Rfl:     ALPRAZolam (XANAX) 0.25 MG tablet, One tab at bedtime, Disp: 30 tablet, Rfl: 0    atorvastatin (LIPITOR) 20 MG tablet, Take 1 tablet by mouth Daily., Disp: 90 tablet, Rfl: 1    B-D UF III MINI PEN NEEDLES 31G X 5 MM misc, USE AS DIRECTED, Disp: 100 each, Rfl: 6    Blood Glucose Monitoring Suppl (Blood Glucose Monitor System) w/Device kit, Check blood sugars tid, Disp: 1 each, Rfl: 0    cephalexin (Keflex) 500 MG capsule, Take 1 capsule by mouth 2 (Two) Times a Day., Disp: 14 capsule, Rfl: 0    cetirizine (zyrTEC) 10 MG tablet, Take 1 tablet by mouth Daily., Disp: 90 tablet, Rfl: 3    citalopram (CeleXA) 20 MG tablet, Take 1 tablet by mouth Daily., Disp: 30 tablet, Rfl: 2    Cyanocobalamin (B-12) 1000 MCG capsule, Take  by mouth., Disp: , Rfl:     cyclobenzaprine (FLEXERIL) 10 MG tablet, TAKE 1 TABLET BY MOUTH 3 TIMES A DAY AS NEEDED FOR MUSCLE SPASMS., Disp: 90 tablet, Rfl: 1    Diclofenac Sodium (VOLTAREN) 1 % gel gel, Apply 4 g topically to the appropriate area as directed 4 (Four) Times a Day As Needed (muscle pain)., Disp: 50 g, Rfl:  6    dicyclomine (BENTYL) 20 MG tablet, Take 1 tablet by mouth 3 (Three) Times a Day., Disp: , Rfl:     diphenhydrAMINE HCl (BENADRYL ALLERGY PO), Take  by mouth., Disp: , Rfl:     glimepiride (Amaryl) 2 MG tablet, Take 1 tablet by mouth Every Morning Before Breakfast., Disp: 90 tablet, Rfl: 1    glucose blood test strip, 1 each by Other route 3 times a day. Use as instructed, Disp: 50 each, Rfl: 11    hydrOXYzine (ATARAX) 50 MG tablet, TAKE 1/2 (ONE-HALF) TO TWO TABLETS BY MOUTH AS NEEDED FOR ANXIETY OR  INSOMNIA, Disp: 180 tablet, Rfl: 0    lisinopril (PRINIVIL,ZESTRIL) 20 MG tablet, Take 1 tablet by mouth Daily., Disp: 90 tablet, Rfl: 3    MAGnesium-Oxide 400 (240 Mg) MG tablet, Take 1 tablet by mouth twice daily, Disp: 60 tablet, Rfl: 0    naloxone (NARCAN) 4 MG/0.1ML nasal spray, 1 spray into the nostril(s) as directed by provider As Needed (per overdose)., Disp: 2 each, Rfl: 2    Ozempic, 2 MG/DOSE, 8 MG/3ML solution pen-injector, INJECT 2 MG SUBCUTANEOUSLY ONCE WEEKLY, Disp: 3 mL, Rfl: 0    pantoprazole (PROTONIX) 40 MG EC tablet, Take 1 tablet by mouth Daily., Disp: 90 tablet, Rfl: 3    phenazopyridine (Pyridium) 100 MG tablet, Take 1 tablet by mouth 3 (Three) Times a Day As Needed for Bladder Spasms., Disp: 6 tablet, Rfl: 0    polyethylene glycol (MIRALAX) 17 g packet, Take 17 g by mouth Daily., Disp: 14 each, Rfl: 0    promethazine (PHENERGAN) 12.5 MG tablet, 1-2 q6h prn, Disp: 60 tablet, Rfl: 2    traMADol (ULTRAM) 50 MG tablet, Take 1 tablet by mouth Every 8 (Eight) Hours As Needed for Moderate Pain., Disp: 90 tablet, Rfl: 0    Triamcinolone Acetonide (Nasacort Allergy 24HR) 55 MCG/ACT nasal inhaler, 2 sprays into the nostril(s) as directed by provider Daily., Disp: 16.5 g, Rfl: 2    vitamin D (ERGOCALCIFEROL) 1.25 MG (21082 UT) capsule capsule, Take 1 capsule by mouth Every 7 (Seven) Days., Disp: 12 capsule, Rfl: 1    gabapentin (NEURONTIN) 600 MG tablet, Take 1 tablet by mouth 3 (Three) Times a Day for  90 days., Disp: 90 tablet, Rfl: 2    The following portions of the patient's history were reviewed and updated as appropriate: allergies, current medications, past family history, past medical history, past social history, past surgical history, and problem list.      REVIEW OF PERTINENT MEDICAL DATA    Past Medical History:   Diagnosis Date    Allergic     Ankle sprain ??    Arthritis     Arthritis of back     Cancer     Pancreatitis    Cholelithiasis 2023    Chronic pain disorder     Colon polyp 5years    Diabetes mellitus     Extremity pain     GERD (gastroesophageal reflux disease)     Headache     Headache, tension-type     Histoplasmosis     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    Hyperlipidemia     Hypertension     Irritable bowel syndrome     Joint pain     Knee pain, left     Knee swelling     Low back pain     Low back strain     Migraine     Murmur     Need for influenza vaccination 2023    Obesity     Rib pain     left side    Rotator cuff syndrome     Seasonal allergies     Tennis elbow      Past Surgical History:   Procedure Laterality Date     SECTION      CHOLECYSTECTOMY N/A 10/27/2023    Procedure: CHOLECYSTECTOMY LAPAROSCOPIC WITH DAVINCI ROBOT;  Surgeon: Mario Alexander MD;  Location: [facility] MAIN OR;  Service: Robotics - DaVinci;  Laterality: N/A;    COLONOSCOPY      ELBOW PROCEDURE      HAND SURGERY      JOINT REPLACEMENT  2023    Left shoulder    LUNG BIOPSY      ORTHOPEDIC SURGERY      SHOULDER SURGERY      SUBTOTAL HYSTERECTOMY      TONSILLECTOMY      TOTAL SHOULDER ARTHROPLASTY W/ DISTAL CLAVICLE EXCISION Left 02/15/2023    Procedure: TOTAL SHOULDER REVERSE ARTHROPLASTY;  Surgeon: Willian Gomez MD;  Location: [facility] MAIN OR;  Service: Orthopedics;  Laterality: Left;    UPPER ENDOSCOPIC ULTRASOUND W/ FNA N/A 2024    Procedure: ENDOSCOPIC ULTRASOUND WITH FINE NEEDLE ASPIRATION;  Surgeon: Arleth Blood MD;  Location: [facility] ENDOSCOPY;  Service:  Gastroenterology;  Laterality: N/A;  post: pancreatic body mass and hiatal hernia     Family History   Problem Relation Age of Onset    Hypertension Mother     Heart disease Mother     Arthritis Mother     Cancer Mother     Thyroid disease Mother     Diabetes Father     Hyperlipidemia Father     No Known Problems Sister     No Known Problems Sister     No Known Problems Sister     No Known Problems Brother     No Known Problems Brother     No Known Problems Brother     No Known Problems Brother     No Known Problems Son     Migraines Sister      Social History     Socioeconomic History    Marital status:    Tobacco Use    Smoking status: Every Day     Current packs/day: 1.00     Average packs/day: 1 pack/day for 5.0 years (5.0 ttl pk-yrs)     Types: Cigarettes     Passive exposure: Current    Smokeless tobacco: Never   Vaping Use    Vaping status: Never Used   Substance and Sexual Activity    Alcohol use: Never    Drug use: Never    Sexual activity: Not Currently     Partners: Male     Birth control/protection: None         Review of Systems   Musculoskeletal:  Positive for arthralgias, back pain and gait problem.         Vitals:    03/18/25 1039   BP: 122/80   Pulse: 82   Resp: 16   SpO2: 97%   Weight: 92.5 kg (204 lb)   PainSc: 7            Objective   Physical Exam  Musculoskeletal:         General: Tenderness present.        Arms:         Legs:            Imaging Reviewed:    MRI thoracic spine without contrast-11/14/2024  - Minimal Modic type I endplate changes anteriorly at T6-T7  - No significant disc herniation, canal stenosis or neuroforaminal narrowing    Thoracic x-ray-9/4/2024  - Mild degenerative changes of thoracic spine and diminished disc space at T10-11 and T11-12     MRI abdomen-6/2024  - Tiny cystic lesion in the pancreatic tail without suspicious enhancement or other definable enhancing lesion in patient with history of biopsy-proven well-differentiated pancreatic neuroendocrine tumor.   Small cystic neuroendocrine tumor or sidebranch IPMN can have this appearance.  Recommend continued surveillance.     MRI pelvis- 2021:   1.High-grade partial to full-thickness tear of the conjoined tendon of the left hamstring origin with some preservation of the semimembranosus contribution.   2.Associated left ischiofemoral impingement with intramuscular edema and partial thickness tearing of the quadratus femoris musculature and concomitant adventitious bursitis.   3.Large lipoma arising from the left piriformis musculature measuring up to 9.3 cm, which extends through the greater sciatic notch into the left hemipelvis. Associated mild posterior displacement of the left sciatic nerve with preserved nerve signal and morphology.   4.Mild degenerative change of the hips bilaterally.     Assessment:    1. Primary osteoarthritis of left knee    2. Piriformis syndrome of left side    3. Tear of left hamstring, subsequent encounter    4. Malignant neoplasm of tail of pancreas    5. Mid back pain on left side    6. Osteoarthritis of left shoulder, unspecified osteoarthritis type         Plan:   1.  UDS on 12/10/2024 is consistent with patient's interview and positive for Xanax.  Negative for tramadol as she takes it sparingly.  Narcotic contract has been signed.  Advised patient not to take Xanax while taking tramadol.  2.  Gabapentin currently is providing some relief without significant side effects.Increase gabapentin 600 mg TID. Side effects discussed with the patient including but not limited to somnolence, dizziness, ataxia, headache, fatigue, blurred vision, cold or flu-like symptoms,delusions, hoarseness, lack or loss of strength, lower back or side pain, swelling of the hands, feet, or lower legs trembling or shaking. Patient understands and agrees with the plan.  3.  Patient has left-sided chest pain radiating to left breast and left mid back area.  Thoracic x-ray was reviewed along with MRI abdomen.  Patient  does have history of pancreatic tail cancer which can certainly cause this referred pain in left chest and mid back area.   Discussed with patient that we may consider diagnostic and therapeutic left-sided celiac plexus block to help control her pain.  I would like her to follow up with GI first with CT abdomen.   4.  Discussed with patient that her left-sided hip/back pain is multifactorial in nature with history of full-thickness tear of hamstring and having large lipoma from left piriformis muscle.  She does have some symptoms of left piriformis syndrome and may benefit from left piriformis injection. Patient has pain in the left buttock area with referred pain in the leg.  Some relief with left-sided piriformis injection.  May consider repeating in future if needed.  5.  She has been taking intermittent tramadol from PCP and request prescription of tramadol.  Continue Tramadol 50 mg as needed-90 tablets sent on 12/10/2024. Side effects discussed including but not limited to nausea, vomiting, constipation, lightheadedness, dizziness, drowsiness, or headache. This medication may increase serotonin and rarely cause a very serious condition called serotonin syndrome/ toxicity.   6. Patient counseled on FDA black box warning of increased risk for morbidity and mortality with concomittent use of benzodiazepine medications and opioid medications. Patient counseled not to take these medications within 6 hours of each other.  7. Patient has pain in the left knee and the Xray shows degenerative changes. Discussed left knee intra articular knee injection. Discussed the possibility of infection, bleeding, nerve damage, headache, increased pain, paraplegia. Patient understands and agrees.     RTC for left knee injection and then 5 weeks follow up.      Vadim Carlisle DO  Pain Management   [facility]       INSPECT REPORT    As part of the patient's treatment plan, I may be prescribing controlled substances. The patient has  been made aware of appropriate use of such medications, including potential risk of somnolence, limited ability to drive and/or work safely, and the potential for dependence or overdose. It has also been made clear that these medications are for use by this patient only, without concomitant use of alcohol or other substances unless prescribed.     Patient has completed prescribing agreement detailing terms of continued prescribing of controlled substances, including monitoring INSPECT reports, urine drug screening, and pill counts if necessary. The patient is aware that inappropriate use will results in cessation of prescribing such medications.    INSPECT report has been reviewed and scanned into the patient's chart.

## 2025-03-18 ENCOUNTER — OFFICE VISIT (OUTPATIENT)
Dept: PAIN MEDICINE | Facility: CLINIC | Age: 65
End: 2025-03-18
Payer: MEDICARE

## 2025-03-18 VITALS
HEART RATE: 82 BPM | DIASTOLIC BLOOD PRESSURE: 80 MMHG | SYSTOLIC BLOOD PRESSURE: 122 MMHG | RESPIRATION RATE: 16 BRPM | BODY MASS INDEX: 33.95 KG/M2 | WEIGHT: 204 LBS | OXYGEN SATURATION: 97 %

## 2025-03-18 DIAGNOSIS — S76.312D TEAR OF LEFT HAMSTRING, SUBSEQUENT ENCOUNTER: ICD-10-CM

## 2025-03-18 DIAGNOSIS — C25.2 MALIGNANT NEOPLASM OF TAIL OF PANCREAS: ICD-10-CM

## 2025-03-18 DIAGNOSIS — M19.012 OSTEOARTHRITIS OF LEFT SHOULDER, UNSPECIFIED OSTEOARTHRITIS TYPE: ICD-10-CM

## 2025-03-18 DIAGNOSIS — G57.02 PIRIFORMIS SYNDROME OF LEFT SIDE: ICD-10-CM

## 2025-03-18 DIAGNOSIS — M54.9 MID BACK PAIN ON LEFT SIDE: ICD-10-CM

## 2025-03-18 DIAGNOSIS — M17.12 PRIMARY OSTEOARTHRITIS OF LEFT KNEE: Primary | ICD-10-CM

## 2025-03-18 RX ORDER — GABAPENTIN 600 MG/1
600 TABLET ORAL 3 TIMES DAILY
Qty: 90 TABLET | Refills: 2 | Status: SHIPPED | OUTPATIENT
Start: 2025-03-18 | End: 2025-06-16

## 2025-03-19 ENCOUNTER — TELEPHONE (OUTPATIENT)
Dept: PAIN MEDICINE | Facility: CLINIC | Age: 65
End: 2025-03-19
Payer: MEDICARE

## 2025-03-19 NOTE — TELEPHONE ENCOUNTER
Caller: Judy Koehler    Relationship: Self    Best call back number: [phone]      What was the call regarding: PT CALLING TO REQUEST TO START PROCESS FOR GETTING NERVE BLOCK WITH DR STAPLES. PT STATES THAT GI PROVIDER HAS CLEARED HER FOR THE PROCEDURE AND SENT THE INFO TO DR STAPLES. PLEASE CONTACT PT TO DISCUSS.

## 2025-03-20 NOTE — TELEPHONE ENCOUNTER
Can you review the ct GI sent over and put in the order for the procedure you are wanting to do if you want to move forward please. It looks like in your note from 3-18 you discussed doing a L celiac plexus block

## 2025-03-23 DIAGNOSIS — E11.9 TYPE 2 DIABETES MELLITUS WITHOUT COMPLICATION, WITHOUT LONG-TERM CURRENT USE OF INSULIN: ICD-10-CM

## 2025-03-24 RX ORDER — GLIMEPIRIDE 2 MG/1
2 TABLET ORAL
Qty: 90 TABLET | Refills: 0 | Status: SHIPPED | OUTPATIENT
Start: 2025-03-24

## 2025-03-25 RX ORDER — PANTOPRAZOLE SODIUM 40 MG/1
40 TABLET, DELAYED RELEASE ORAL DAILY
Qty: 90 TABLET | Refills: 1 | Status: SHIPPED | OUTPATIENT
Start: 2025-03-25

## 2025-03-25 RX ORDER — LISINOPRIL 20 MG/1
20 TABLET ORAL DAILY
Qty: 90 TABLET | Refills: 1 | Status: SHIPPED | OUTPATIENT
Start: 2025-03-25

## 2025-03-27 ENCOUNTER — PATIENT OUTREACH (OUTPATIENT)
Dept: FAMILY MEDICINE CLINIC | Facility: CLINIC | Age: 65
End: 2025-03-27
Payer: MEDICARE

## 2025-03-31 RX ORDER — MAGNESIUM OXIDE TAB 400 MG (241.3 MG ELEMENTAL MG) 400 (241.3 MG) MG
1 TAB ORAL 2 TIMES DAILY
Qty: 60 TABLET | Refills: 0 | Status: SHIPPED | OUTPATIENT
Start: 2025-03-31

## 2025-04-09 ENCOUNTER — TELEPHONE (OUTPATIENT)
Dept: PAIN MEDICINE | Facility: CLINIC | Age: 65
End: 2025-04-09

## 2025-04-09 NOTE — TELEPHONE ENCOUNTER
Caller: Judy Koehler    Relationship to patient: Self    Best call back number: [phone]    Patient is needing: PT IS CALLING BECAUSE SHE WENT TO THE DENTIST AND RECEIVED AN IMPLANT YESTERDAY AND WAS GIVEN SOME HYDROCODONE AND IS WANTING TO KNOW IF IT WAS OKAY TO TAKE IT OR NOT.. PLEASE ADVISE..

## 2025-04-14 ENCOUNTER — OFFICE (OUTPATIENT)
Dept: URBAN - METROPOLITAN AREA CLINIC 64 | Facility: CLINIC | Age: 65
End: 2025-04-14
Payer: MEDICARE

## 2025-04-14 VITALS
DIASTOLIC BLOOD PRESSURE: 76 MMHG | HEIGHT: 66 IN | WEIGHT: 210 LBS | HEART RATE: 89 BPM | SYSTOLIC BLOOD PRESSURE: 110 MMHG

## 2025-04-14 DIAGNOSIS — R10.12 LEFT UPPER QUADRANT PAIN: ICD-10-CM

## 2025-04-14 DIAGNOSIS — C25.9 MALIGNANT NEOPLASM OF PANCREAS, UNSPECIFIED: ICD-10-CM

## 2025-04-14 DIAGNOSIS — R19.4 CHANGE IN BOWEL HABIT: ICD-10-CM

## 2025-04-14 PROCEDURE — 99213 OFFICE O/P EST LOW 20 MIN: CPT | Performed by: NURSE PRACTITIONER

## 2025-04-14 RX ORDER — SEMAGLUTIDE 2.68 MG/ML
2 INJECTION, SOLUTION SUBCUTANEOUS WEEKLY
Qty: 3 ML | Refills: 0 | Status: SHIPPED | OUTPATIENT
Start: 2025-04-14

## 2025-04-15 ENCOUNTER — PROCEDURE VISIT (OUTPATIENT)
Dept: PAIN MEDICINE | Facility: CLINIC | Age: 65
End: 2025-04-15
Payer: MEDICARE

## 2025-04-15 VITALS
RESPIRATION RATE: 16 BRPM | DIASTOLIC BLOOD PRESSURE: 80 MMHG | HEART RATE: 97 BPM | WEIGHT: 204 LBS | OXYGEN SATURATION: 97 % | SYSTOLIC BLOOD PRESSURE: 132 MMHG | BODY MASS INDEX: 33.95 KG/M2

## 2025-04-15 DIAGNOSIS — C25.2 MALIGNANT NEOPLASM OF TAIL OF PANCREAS: ICD-10-CM

## 2025-04-15 DIAGNOSIS — M17.12 PRIMARY OSTEOARTHRITIS OF LEFT KNEE: Primary | ICD-10-CM

## 2025-04-15 PROCEDURE — 20610 DRAIN/INJ JOINT/BURSA W/O US: CPT | Performed by: STUDENT IN AN ORGANIZED HEALTH CARE EDUCATION/TRAINING PROGRAM

## 2025-04-15 RX ORDER — BUPIVACAINE HYDROCHLORIDE 2.5 MG/ML
5 INJECTION, SOLUTION INFILTRATION; PERINEURAL ONCE
Status: COMPLETED | OUTPATIENT
Start: 2025-04-15 | End: 2025-04-15

## 2025-04-15 RX ORDER — METHYLPREDNISOLONE ACETATE 40 MG/ML
40 INJECTION, SUSPENSION INTRA-ARTICULAR; INTRALESIONAL; INTRAMUSCULAR; SOFT TISSUE ONCE
Status: COMPLETED | OUTPATIENT
Start: 2025-04-15 | End: 2025-04-15

## 2025-04-15 RX ADMIN — BUPIVACAINE HYDROCHLORIDE 5 ML: 2.5 INJECTION, SOLUTION INFILTRATION; PERINEURAL at 08:39

## 2025-04-15 RX ADMIN — METHYLPREDNISOLONE ACETATE 40 MG: 40 INJECTION, SUSPENSION INTRA-ARTICULAR; INTRALESIONAL; INTRAMUSCULAR; SOFT TISSUE at 08:40

## 2025-04-15 NOTE — PROGRESS NOTES
H and P reviewed from previous visit and no changes to patient's clinical presentation. Will proceed with procedure as planned. Hx of DM-2    Vadim Carlisle DO  Pain Management   [facility]     PREOPERATIVE DIAGNOSIS:    1. Left Knee pain     POSTOPERATIVE DIAGNOSIS:  Same    PROCEDURE Left knee Intra articular knee injection.    PROCEDURE NOTE:  After obtaining written informed consent patient was taken to the procedure room. Pre-procedure blood pressure and pulse were stable and recorded in patients clinic chart.     The patient was placed in a sitting position and the left knee was prepped with chloraprep and draped in the usual sterile fashion.  The skin over the left patella was identified. The skin was infilterated with 1% lidoacaine using 25 g needle. 22-gauge-1.5-inch needle was inserted into the knee joint from the medial side.  Following the negative aspiration, 5 ml of 0.25 % bupivacaine with 40 mg of depo medrol  was injected without any complications.     After the procedure the needle was removed and sterile band-aid was placed.    Following the procedure the patient's vital signs were stable. The patient was discharged home in good condition after being given discharge instructions.      COMPLICATIONS: None     Vadim Carlisle DO  Pain Management   [facility]

## 2025-04-17 ENCOUNTER — TELEPHONE (OUTPATIENT)
Dept: FAMILY MEDICINE CLINIC | Facility: CLINIC | Age: 65
End: 2025-04-17

## 2025-04-17 NOTE — TELEPHONE ENCOUNTER
Caller: Judy Koehler    Relationship to patient: Self    Best call back number:   Telephone Information:   Mobile [phone]         Patient is needing: PATIENT  IS WANTING TO TRANSFER HER CARE FROM [place] TO [facility]. PLEASE DISCUSS AND CALL BACK TO ADVISE.

## 2025-04-29 ENCOUNTER — OFFICE VISIT (OUTPATIENT)
Dept: FAMILY MEDICINE CLINIC | Facility: CLINIC | Age: 65
End: 2025-04-29
Payer: MEDICARE

## 2025-04-29 VITALS
SYSTOLIC BLOOD PRESSURE: 119 MMHG | TEMPERATURE: 97.3 F | WEIGHT: 207 LBS | DIASTOLIC BLOOD PRESSURE: 79 MMHG | HEIGHT: 65 IN | OXYGEN SATURATION: 94 % | BODY MASS INDEX: 34.49 KG/M2 | HEART RATE: 86 BPM

## 2025-04-29 DIAGNOSIS — E11.9 TYPE 2 DIABETES MELLITUS WITHOUT COMPLICATION, WITHOUT LONG-TERM CURRENT USE OF INSULIN: Primary | ICD-10-CM

## 2025-04-29 DIAGNOSIS — E55.9 VITAMIN D DEFICIENCY: ICD-10-CM

## 2025-04-29 DIAGNOSIS — F43.20 GRIEF REACTION: ICD-10-CM

## 2025-04-29 DIAGNOSIS — K58.2 IRRITABLE BOWEL SYNDROME WITH BOTH CONSTIPATION AND DIARRHEA: ICD-10-CM

## 2025-04-29 DIAGNOSIS — F17.200 SMOKING ADDICTION: ICD-10-CM

## 2025-04-29 DIAGNOSIS — Z87.891 PERSONAL HISTORY OF NICOTINE DEPENDENCE: ICD-10-CM

## 2025-04-29 RX ORDER — ERGOCALCIFEROL 1.25 MG/1
50000 CAPSULE, LIQUID FILLED ORAL
Qty: 12 CAPSULE | Refills: 1 | Status: SHIPPED | OUTPATIENT
Start: 2025-04-29

## 2025-04-29 RX ORDER — FLURBIPROFEN SODIUM 0.3 MG/ML
1 SOLUTION/ DROPS OPHTHALMIC SEE ADMIN INSTRUCTIONS
Qty: 100 EACH | Refills: 6 | Status: SHIPPED | OUTPATIENT
Start: 2025-04-29

## 2025-04-29 NOTE — PROGRESS NOTES
Subjective   Judy Koehler is a 64 y.o. female presents for   Chief Complaint   Patient presents with    Establish Care    Medicare Wellness-subsequent       Health Maintenance Due   Topic Date Due    DIABETIC FOOT EXAM  Never done    ZOSTER VACCINE (1 of 2) Never done    LUNG CANCER SCREENING  Never done    ANNUAL WELLNESS VISIT  Never done    TDAP/TD VACCINES (3 - Td or Tdap) 04/19/2023    URINE MICROALBUMIN-CREATININE RATIO (uACR)  12/12/2024    DIABETIC EYE EXAM  01/12/2025       History of Present Illness  The patient presents for [facility].    The chief complaint is the high cost of Ozempic 2 mg weekly, now $200 per month due to an insurance change. She seeks a more affordable alternative. Previous medications include glimepiride and metformin, with metformin discontinued due to inadequate diabetes control despite no adverse effects. Blood glucose levels are not frequently monitored, and refills for glucometer strips and lancets are needed. Two doses of Ozempic remain.    Citalopram, hydroxyzine, and Xanax have been discontinued, and she reports managing well without them. Following the passing of her  6 months ago, she feels she is adjusting and improving, with no interest in new medications or counseling.    Migraines have been less frequent recently, and Phenergan is used as needed for migraines and stomach upset.    Vitamin D and B12 supplements have not been refilled recently, and she was previously taking them.     A history of pancreatic cancer was successfully treated, with recent tests showing normal results. Lung cancer screening has not been performed, and there is no current desire to quit smoking.    Significant pain in the left side, leg, and hip is reported, with scheduled injections from a pain management specialist. Similar injections have been received in the past.    A history of urinary tract infections (UTIs) is noted, with the most recent requiring two courses of  "treatment.    Bentyl was discontinued by her gastroenterologist. Gastrointestinal issues, including diarrhea and constipation, are managed with MiraLAX and Benefiber in the morning.    SOCIAL HISTORY  She admits to smoking.    Vitals:    04/29/25 0856   BP: 119/79   BP Location: Right arm   Patient Position: Sitting   Cuff Size: Adult   Pulse: 86   Temp: 97.3 °F (36.3 °C)   TempSrc: Infrared   SpO2: 94%   Weight: 93.9 kg (207 lb)   Height: 165.1 cm (65\")     Body mass index is 34.45 kg/m².    Current Outpatient Medications on File Prior to Visit   Medication Sig Dispense Refill    acetaminophen (TYLENOL) 325 MG tablet Take 2 tablets by mouth Every 6 (Six) Hours As Needed for Mild Pain.      atorvastatin (LIPITOR) 20 MG tablet Take 1 tablet by mouth Daily. 90 tablet 1    Blood Glucose Monitoring Suppl (Blood Glucose Monitor System) w/Device kit Check blood sugars tid 1 each 0    cetirizine (zyrTEC) 10 MG tablet Take 1 tablet by mouth Daily. 90 tablet 3    Cyanocobalamin (B-12) 1000 MCG capsule Take  by mouth.      cyclobenzaprine (FLEXERIL) 10 MG tablet TAKE 1 TABLET BY MOUTH 3 TIMES A DAY AS NEEDED FOR MUSCLE SPASMS. 90 tablet 1    Diclofenac Sodium (VOLTAREN) 1 % gel gel Apply 4 g topically to the appropriate area as directed 4 (Four) Times a Day As Needed (muscle pain). 50 g 6    diphenhydrAMINE HCl (BENADRYL ALLERGY PO) Take  by mouth.      gabapentin (NEURONTIN) 600 MG tablet Take 1 tablet by mouth 3 (Three) Times a Day for 90 days. 90 tablet 2    glimepiride (AMARYL) 2 MG tablet TAKE 1 TABLET BY MOUTH ONCE DAILY IN THE MORNING BEFORE BREAKFAST 90 tablet 0    lisinopril (PRINIVIL,ZESTRIL) 20 MG tablet Take 1 tablet by mouth Daily. 90 tablet 1    Magnesium Oxide -Mg Supplement (MAGnesium-Oxide) 400 (240 Mg) MG tablet Take 1 tablet by mouth twice daily 60 tablet 0    naloxone (NARCAN) 4 MG/0.1ML nasal spray 1 spray into the nostril(s) as directed by provider As Needed (per overdose). 2 each 2    pantoprazole " (PROTONIX) 40 MG EC tablet Take 1 tablet by mouth Daily. 90 tablet 1    phenazopyridine (Pyridium) 100 MG tablet Take 1 tablet by mouth 3 (Three) Times a Day As Needed for Bladder Spasms. 6 tablet 0    polyethylene glycol (MIRALAX) 17 g packet Take 17 g by mouth Daily. 14 each 0    promethazine (PHENERGAN) 12.5 MG tablet 1-2 q6h prn 60 tablet 2    traMADol (ULTRAM) 50 MG tablet Take 1 tablet by mouth Every 8 (Eight) Hours As Needed for Moderate Pain. 90 tablet 0    [DISCONTINUED] B-D UF III MINI PEN NEEDLES 31G X 5 MM misc USE AS DIRECTED 100 each 6    [DISCONTINUED] glucose blood test strip 1 each by Other route 3 times a day. Use as instructed 50 each 11    [DISCONTINUED] Ozempic, 2 MG/DOSE, 8 MG/3ML solution pen-injector INJECT 2 MG SUBCUTANEOUSLY  ONCE A WEEK 3 mL 0    Triamcinolone Acetonide (Nasacort Allergy 24HR) 55 MCG/ACT nasal inhaler 2 sprays into the nostril(s) as directed by provider Daily. (Patient not taking: Reported on 4/29/2025) 16.5 g 2    [DISCONTINUED] ALPRAZolam (XANAX) 0.25 MG tablet One tab at bedtime (Patient not taking: Reported on 4/29/2025) 30 tablet 0    [DISCONTINUED] cephalexin (Keflex) 500 MG capsule Take 1 capsule by mouth 2 (Two) Times a Day. (Patient not taking: Reported on 4/29/2025) 14 capsule 0    [DISCONTINUED] citalopram (CeleXA) 20 MG tablet Take 1 tablet by mouth Daily. (Patient not taking: Reported on 4/29/2025) 30 tablet 2    [DISCONTINUED] dicyclomine (BENTYL) 20 MG tablet Take 1 tablet by mouth 3 (Three) Times a Day. (Patient not taking: Reported on 4/29/2025)      [DISCONTINUED] hydrOXYzine (ATARAX) 50 MG tablet TAKE 1/2 (ONE-HALF) TO TWO TABLETS BY MOUTH AS NEEDED FOR ANXIETY OR  INSOMNIA (Patient not taking: Reported on 4/29/2025) 180 tablet 0    [DISCONTINUED] vitamin D (ERGOCALCIFEROL) 1.25 MG (37169 UT) capsule capsule Take 1 capsule by mouth Every 7 (Seven) Days. (Patient not taking: Reported on 4/29/2025) 12 capsule 1     No current facility-administered  medications on file prior to visit.       The following portions of the patient's history were reviewed and updated as appropriate: allergies, current medications, past family history, past medical history, past social history, past surgical history, and problem list.    Review of Systems    Objective   Physical Exam  Vitals and nursing note reviewed.   Constitutional:       Appearance: Normal appearance. She is well-developed.   HENT:      Head: Normocephalic and atraumatic.      Right Ear: External ear normal.      Left Ear: External ear normal.      Nose: Nose normal.   Eyes:      Extraocular Movements: Extraocular movements intact.      Pupils: Pupils are equal, round, and reactive to light.   Cardiovascular:      Rate and Rhythm: Normal rate and regular rhythm.      Heart sounds: Normal heart sounds.   Pulmonary:      Effort: Pulmonary effort is normal.      Breath sounds: Normal breath sounds.   Abdominal:      General: Bowel sounds are normal.      Palpations: Abdomen is soft.   Genitourinary:     Vagina: Normal.   Musculoskeletal:         General: Normal range of motion.      Cervical back: Normal range of motion and neck supple.   Skin:     General: Skin is warm and dry.   Neurological:      General: No focal deficit present.      Mental Status: She is alert and oriented to person, place, and time.   Psychiatric:         Mood and Affect: Mood normal.         Behavior: Behavior normal.         Judgment: Judgment normal.          Respiratory: Clear to auscultation, no wheezing, rales or rhonchi    PHQ-9 Total Score:      Results  Labs   - Vitamin D level: 66    Assessment & Plan   Diagnoses and all orders for this visit:    1. Type 2 diabetes mellitus without complication, without long-term current use of insulin (Primary)  -     SITagliptin (Januvia) 50 MG tablet; Take 1 tablet by mouth Daily.  Dispense: 30 tablet; Refill: 3  -     Insulin Pen Needle (B-D UF III MINI PEN NEEDLES) 31G X 5 MM misc; Inject 1  each under the skin into the appropriate area as directed See Admin Instructions. Three times daily  Dispense: 100 each; Refill: 6  -     glucose blood test strip; 1 each by Other route 3 times a day. Use as instructed  Dispense: 100 each; Refill: 3    2. Vitamin D deficiency  -     vitamin D (ERGOCALCIFEROL) 1.25 MG (71063 UT) capsule capsule; Take 1 capsule by mouth Every 7 (Seven) Days.  Dispense: 12 capsule; Refill: 1    3. Smoking addiction  -      CT Chest Low Dose Cancer Screening WO; Future    4. Personal history of nicotine dependence  -      CT Chest Low Dose Cancer Screening WO; Future    5. Grief reaction    6. Irritable bowel syndrome with both constipation and diarrhea         1. Diabetes Mellitus.  - Her diabetes was not well controlled with metformin and glimepiride, leading to the prescription of Ozempic. However, due to its high cost following an insurance change, an alternative treatment is necessary.  - Januvia is a tier 2 medication, which should be less expensive than Ozempic, a tier 3 drug.  The most common side effects are vaginal yeast infections or UTIs due to the increased glucose in the urine.  - She will start on Januvia 50 mg. If it proves too expensive or causes adverse effects, she will inform us. She is advised to monitor her blood sugar levels closely during this medication change, as the initial dose of 50 mg may be insufficient.  - A prescription for lancets and strips will be provided, and she is advised to check her blood sugar 3 times daily. She will discontinue Ozempic after finishing her current supply and wait 2 weeks before starting Januvia to avoid potential interactions between the 2 medications.    2. Depression.  - She has discontinued her citalopram, hydroxyzine, and Xanax, reporting that she is managing well without them.  - She does not express interest in trying new medications or seeking counseling at this time.  - A resource for local therapists,  [facility], has been provided should she require additional support in the future.    3. Migraines.  - She uses Phenergan as needed for migraines and stomach upset.  - Her migraines have been less frequent recently.    4. Vitamin D deficiency.  - Her vitamin D levels have been low in the past but have improved to a normal range with supplementation.  - A refill for her vitamin D supplement will be provided.  - Her B12 levels will be rechecked during her next lab work to determine if continued supplementation is necessary.    5. Health Maintenance.  - She is up to date on colon cancer screening and is current with her GI follow-ups.  - Her last mammogram was in 08/2024, so she will be due for another one in 08/2025.  - A CT scan of her lungs will be ordered to screen for early signs of lung cancer and emphysema. If she decides to quit smoking, she will inform us so we can discuss potential aids.    6. Pain management.  - She experiences significant pain in her left side, leg, and hip.  - She is scheduled to see a pain management specialist for injections in her back.    7. Gastrointestinal issues.  - She has been taken off Bentyl by her gastroenterologist.  - She reports gastrointestinal issues, including diarrhea and constipation, which are being managed with MiraLAX and Benefiber in the morning.  - She may experience an improvement in her bowel movements upon discontinuation of Ozempic, as it slows down gastric emptying. However, she may also experience diarrhea during the transition. If this occurs, she is advised to reduce her MiraLAX intake by half to avoid constipation while continuing her normal dose of Benefiber.    Follow-up  The patient is scheduled for a follow-up visit in 3 months.    Patient Instructions   [employer]Exit Games         Patient or patient representative verbalized consent for the use of Ambient Listening during the visit with  YANY Davidson for chart documentation. 4/29/2025   09:25 EDT

## 2025-04-30 ENCOUNTER — TELEPHONE (OUTPATIENT)
Dept: PAIN MEDICINE | Facility: CLINIC | Age: 65
End: 2025-04-30
Payer: MEDICARE

## 2025-04-30 NOTE — TELEPHONE ENCOUNTER
Caller: Judy Koehler     Relationship: SELF    Best call back number: [phone]    What is your medical concern? SPOKE WITH PT- SHE HAS A PROCEDURE SCHEDULED FOR 5.1.25- PT WOULD LIKE TO KNOW HOW LONG BEFORE SHE WILL BE ABLE TO DRIVE ONCE SHE HAS THE PROCEDURE DONE ON TOMORROW    PT KNOWS THAT SHE CANNOT DRIVE HERSELF TO THE APPOINTMENT ON TOMORROW BUT WOULD LIKE TO KNOW HOW LONG SHE HAS TO WAIT AFTER THE PROCEDURE TO START BACK DRIVING ?    PLEASE CONTACT PT AND ADVISE

## 2025-05-01 ENCOUNTER — HOSPITAL ENCOUNTER (OUTPATIENT)
Dept: PAIN MEDICINE | Facility: HOSPITAL | Age: 65
Discharge: HOME | End: 2025-05-01
Payer: MEDICARE

## 2025-05-01 VITALS
BODY MASS INDEX: 34.49 KG/M2 | HEIGHT: 65 IN | OXYGEN SATURATION: 96 % | DIASTOLIC BLOOD PRESSURE: 70 MMHG | SYSTOLIC BLOOD PRESSURE: 115 MMHG | HEART RATE: 91 BPM | TEMPERATURE: 97.1 F | RESPIRATION RATE: 16 BRPM | WEIGHT: 207 LBS

## 2025-05-01 DIAGNOSIS — C25.2 MALIGNANT NEOPLASM OF TAIL OF PANCREAS: Primary | ICD-10-CM

## 2025-05-01 DIAGNOSIS — R52 PAIN: ICD-10-CM

## 2025-05-01 PROCEDURE — 77003 FLUOROGUIDE FOR SPINE INJECT: CPT

## 2025-05-01 PROCEDURE — 25010000002 DEXAMETHASONE SODIUM PHOSPHATE 10 MG/ML SOLUTION: Performed by: STUDENT IN AN ORGANIZED HEALTH CARE EDUCATION/TRAINING PROGRAM

## 2025-05-01 PROCEDURE — 25010000002 EPINEPHRINE 1 MG/10ML SOLUTION PREFILLED SYRINGE: Performed by: STUDENT IN AN ORGANIZED HEALTH CARE EDUCATION/TRAINING PROGRAM

## 2025-05-01 PROCEDURE — 25510000001 IOPAMIDOL 41 % SOLUTION: Performed by: STUDENT IN AN ORGANIZED HEALTH CARE EDUCATION/TRAINING PROGRAM

## 2025-05-01 PROCEDURE — 25010000002 ROPIVACAINE PER 1 MG: Performed by: STUDENT IN AN ORGANIZED HEALTH CARE EDUCATION/TRAINING PROGRAM

## 2025-05-01 RX ORDER — EPINEPHRINE IN SOD CHLOR,ISO 1 MG/10 ML
0.01 SYRINGE (ML) INTRAVENOUS ONCE
Status: COMPLETED | OUTPATIENT
Start: 2025-05-01 | End: 2025-05-01

## 2025-05-01 RX ORDER — IOPAMIDOL 408 MG/ML
3 INJECTION, SOLUTION INTRATHECAL
Status: COMPLETED | OUTPATIENT
Start: 2025-05-01 | End: 2025-05-01

## 2025-05-01 RX ORDER — DEXAMETHASONE SODIUM PHOSPHATE 10 MG/ML
10 INJECTION, SOLUTION INTRAMUSCULAR; INTRAVENOUS ONCE
Status: COMPLETED | OUTPATIENT
Start: 2025-05-01 | End: 2025-05-01

## 2025-05-01 RX ORDER — ROPIVACAINE HYDROCHLORIDE 2 MG/ML
10 INJECTION, SOLUTION EPIDURAL; INFILTRATION; PERINEURAL ONCE
Status: COMPLETED | OUTPATIENT
Start: 2025-05-01 | End: 2025-05-01

## 2025-05-01 RX ADMIN — ROPIVACAINE HYDROCHLORIDE 18 MG: 2 INJECTION EPIDURAL; INFILTRATION; PERINEURAL at 09:33

## 2025-05-01 RX ADMIN — IOPAMIDOL 3 ML: 408 INJECTION, SOLUTION INTRATHECAL at 09:32

## 2025-05-01 RX ADMIN — DEXAMETHASONE SODIUM PHOSPHATE 10 MG: 10 INJECTION, SOLUTION INTRAMUSCULAR; INTRAVENOUS at 09:32

## 2025-05-01 RX ADMIN — EPINEPHRINE 0.01 MG: 0.1 INJECTION, SOLUTION ENDOTRACHEAL; INTRACARDIAC; INTRAVENOUS at 09:33

## 2025-05-01 NOTE — DISCHARGE INSTRUCTIONS
Peripheral Nerve Block  Peripheral nerve block is an injection of numbing medicine (regional anesthetic) near a nerve. The regional anesthetic numbs everything below the injection site. This provides pain relief during and after a medical procedure.  You will most likely be awake while a peripheral nerve block is done.   Tell a health care provider about:  Any allergies you have.  All medicines you are taking, including vitamins, herbs, eye drops, creams, and over-the-counter medicines.  Any problems you or family members have had with anesthetic medicines.  Any bleeding problems you have.  Any surgeries you have had.  Any medical conditions you have.  Whether you are pregnant or may be pregnant.  What are the risks?  Generally, this is a safe procedure. However, problems may occur, including:  Infection.  Bleeding.  Allergic reactions to medicines.  Damage to nearby structures or organs, such as temporary or permanent nerve damage.     Medicines  Ask your health care provider about:  Changing or stopping your regular medicines. This is especially important if you are taking diabetes medicines or blood thinners.  Taking medicines such as aspirin and ibuprofen. These medicines can thin your blood. Do not take these medicines unless your health care provider tells you to take them.  Taking over-the-counter medicines, vitamins, herbs, and supplements.  General instructions  If you will be going home right after the procedure, plan to have a responsible adult:  Take you home from the hospital or clinic. You will not be allowed to drive.  Care for you for the time you are told.  No heat to area for 24 hours.  You may use ice to the area for comfort. Do not apply ice pack directly to the skin, apply over a thin towel or cloth. Use ice for 15-20 minutes 3 times a day if needed.  Keep your bandaid(s) clean and dry for 24 hours then you may remove them.  No swimming, hot tub or getting area wet for 24 hours.  What happens  during the procedure?  Your nerve may be located by using:  Sound waves that create images of the area (ultrasound).  A device that activates the nerve and causes your muscles to twitch (nerve stimulator).  Fluoroscopy  The skin around your injection site will be cleaned with a germ-killing solution.  Medicine to numb the injection site (local anesthetic) may be injected into the tissue above the nerve.  Regional anesthetic will be injected into the area near your nerve, not directly into the nerve. You should not feel any pain. The area of your peripheral nerve block will begin to feel warm and numb.  Your injection site may be covered with a bandage (dressing) when your medical procedure is done.  The procedure may vary among health care providers and hospitals.  What can I expect after the procedure?  Your blood pressure, heart rate, breathing rate, and blood oxygen level will be monitored until you leave the hospital or clinic.  2. You may continue to be numb for up to 36 hours.  3. If you have a catheter in place, you will continue to be numb until the catheter is removed.  4. To reduce your risk of injury:  Do not expose the numb area to heat or cold.  Do not stand up or try to walk without help if you have a nerve block in one leg or both legs. Get help and limit your activity as told by your health care provider.  As the medicine wears off, you will have a gradual return of feeling in the area that is supplied by the nerve. It might start as an unpleasant tingling or as a prickling and tingling sensation.  Follow these instructions at home:  Injection site care  If you have a dressing, remove it 24 hours after your procedure, or as told by your health care provider.  Check your injection site every day for signs of infection. Check for:  Redness, swelling, or pain.  Fluid or blood.  Warmth.  Pus or a bad smell.  General instructions  Take over-the-counter and prescription medicines only as told by your  health care provider.  Do not take a bath or soak in water--such as in a swimming pool, lake, or hot tub--until your health care provider approves.  Keep all follow-up visits.  Contact a health care provider if:  You have a fever or chills.  You continue to have numbness, weakness, or tingling after 36 hours.  You have redness, swelling, or pain around your injection site.  Get help right away if:  You have trouble breathing.  You have chest pain.  You have uncontrolled pain in the area that should be numbed.  You have symptoms of a reaction to the nerve block. Symptoms of a reaction include:  Numb lips.  Changes to vision or hearing, such as blurred vision or ringing in your ears.  A metallic taste in your mouth.  Increased anxiety.  Dizziness.  Shaking (such as tremors), twitching, or seizures.  Changes in bowel or bladder control.  These symptoms may be an emergency. Get help right away. Call 911.  Do not wait to see if the symptoms will go away.  Do not drive yourself to the hospital.  Summary  Peripheral nerve block is an injection of numbing medicine (regional anesthetic) near a nerve. This provides pain relief during and after a medical procedure.  Feeling will gradually return to the area that is supplied by the nerve.  Contact a health care provider if you continue to have numbness, weakness, or tingling after 36 hours.  This information is not intended to replace advice given to you by your health care provider. Make sure you discuss any questions you have with your health care provider.  Document Revised: 08/25/2022 Document Reviewed: 06/20/2022  Elsevier Patient Education © 2022 [employer]

## 2025-05-01 NOTE — PROCEDURES
PRE OPERATIVE DIAGNOSIS:    1. Abdominal pain    POST OPERATIVE DIAGNOSIS:  Same.     PROCEDURE: Left Celiac Plexus Block under Fluoroscopy.       PROCEDURE:  Written informed consent patient was taken. Pre-procedure blood pressure and pulse were stable and recorded in patients clinic chart.  The patient was brought to the procedure room and placed in a prone position with a pillow support under the abdomen.  The patient back was prepped with antiseptic solution and draped in the usual sterile fashion.  The LEFT  L1 vertebral body was identified using fluoroscopic guidance.  Under oblique view anterolateral part of the vertebral body was identified. Skin infiltration with 1% lidocaine was performed at the needle entry site.  22 gauge, 5-inch needle was advanced on the left side and the needle was walked anteriorly off the vertebral body under fluoroscopic guidance.  No paresthesias were noted. The needle tip position was confirmed in AP and lateral view. After negative needle aspiration, 3 ml of the dye was injected and showed very well spread of the dye along the plexus.  A test dose of 2 cc of 0.2% ropivacaine with epi was injected, was negative for intra vascular spread. 10 mg dexamethasone was injected followed by another 7 cc of 0.2% ropivicaine. The needle was flushed and removed, and a Band-Aid was applied.     The patient tolerated it well. There was no evidence of somatic blockade.   Following the procedure the patient's vital signs were stable. The patient was discharged home in good condition after being given discharge instructions.    COMPLICATIONS None    Vadim Cralisle DO  Pain Management   [facility]

## 2025-05-01 NOTE — H&P
H and P reviewed from previous visit and no changes to patient's clinical presentation. Will proceed with procedure as planned.       Vadim Carlisle DO  Pain Management   [facility]

## 2025-05-02 DIAGNOSIS — E11.9 TYPE 2 DIABETES MELLITUS WITHOUT COMPLICATION, WITHOUT LONG-TERM CURRENT USE OF INSULIN: ICD-10-CM

## 2025-05-02 RX ORDER — MAGNESIUM OXIDE TAB 400 MG (241.3 MG ELEMENTAL MG) 400 (241.3 MG) MG
1 TAB ORAL 2 TIMES DAILY
Qty: 60 TABLET | Refills: 0 | Status: SHIPPED | OUTPATIENT
Start: 2025-05-02

## 2025-05-02 RX ORDER — GLIMEPIRIDE 2 MG/1
2 TABLET ORAL DAILY
Qty: 90 TABLET | Refills: 1 | Status: SHIPPED | OUTPATIENT
Start: 2025-05-02

## 2025-05-05 ENCOUNTER — TELEPHONE (OUTPATIENT)
Dept: PAIN MEDICINE | Facility: HOSPITAL | Age: 65
End: 2025-05-05
Payer: MEDICARE

## 2025-05-05 NOTE — TELEPHONE ENCOUNTER
Post procedure phone call completed.  Pt states they are doing good and denies questions or concerns. Pt reports Pain level #4 today.

## 2025-05-07 NOTE — TELEPHONE ENCOUNTER
This was discontinued because the patient told me it was too expensive and I ordered Januvia for her.  Please clarify with patient if she wants to resume Ozempic or if she is taking Januvia.

## 2025-05-08 RX ORDER — MAGNESIUM OXIDE TAB 400 MG (241.3 MG ELEMENTAL MG) 400 (241.3 MG) MG
1 TAB ORAL 2 TIMES DAILY
Qty: 60 TABLET | Refills: 0 | OUTPATIENT
Start: 2025-05-08

## 2025-05-15 RX ORDER — SEMAGLUTIDE 2.68 MG/ML
2 INJECTION, SOLUTION SUBCUTANEOUS WEEKLY
Qty: 3 ML | Refills: 0 | OUTPATIENT
Start: 2025-05-15

## 2025-05-19 NOTE — PROGRESS NOTES
Subjective   Judy Koehler is a 64 y.o. female is here for follow up for left-sided rib pain and left hip pain.  Patient was last seen for left celiac plexus block and left knee injection.    On last visit:     Mid left sided rib pain is 0/10 on VAS, at maximum is 1/10. Pain is soreness, sharp, stabbing in nature. Pain is referred left breast, mid back. The pain is constant. The pain is improved by sitting on couch with heating pad, some pain relief with muscle relaxants and gabapentin, celiac plexus block. The pain is worse with house work, increased activities, lifting. Trouble sleeping due to pain     Left hip pain is 3/10  on VAS. Hx of hamstring tear 2021. She has seen ortho who recommended conservative management. Worse with sitting for too long or walking too long.     Left knee pain is 4/10 on VAS. Worse with weight bearing. Improved pain relief with steroid injections last year with Orthopedics.     R knee pain is 7/10 on VAS. Dull, achy pain. Worse with weight bearing.        Previous Injection:   5/1/2025-left-sided celiac plexus block- 100% pain relief with left sided ribs pain.   4/15/2025-left knee intra-articular steroid injection - 50% pain relief.   11/14/2024-left piriformis injection-60% pain relief overall.    Hx: Referred by  Debbie Phillips APRN for mid back pain.Mid back pain started about 6 moths ago and L hip pain started about 3 years ago. L hip pain started after a fall.        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     Current Medications:   Tramadol  Flexeril 10  mg  Tylenol  Gabapentin 300 mg BID  Xanax 0.5 mg daily as needed        Past Medications:     Past Modalities:  TENS:                                                                          no                                                  Physical Therapy Within The Last 6 Months              no  Psychotherapy                                                            no  Massage Therapy                                                       no     Patient Complains Of:  Uro-Fecal Incontinence          no  Weight Gain/Loss                   no  Fever/Chills                             no  Weakness                               no        PEG Assessment   What number best describes your pain on average in the past week?6  What number best describes how, during the past week, pain has interfered with your enjoyment of life?6  What number best describes how, during the past week, pain has interfered with your general activity?  6    Current Outpatient Medications:     acetaminophen (TYLENOL) 325 MG tablet, Take 2 tablets by mouth Every 6 (Six) Hours As Needed for Mild Pain., Disp: , Rfl:     atorvastatin (LIPITOR) 20 MG tablet, Take 1 tablet by mouth Daily., Disp: 90 tablet, Rfl: 1    Blood Glucose Monitoring Suppl (Blood Glucose Monitor System) w/Device kit, Check blood sugars tid, Disp: 1 each, Rfl: 0    cetirizine (zyrTEC) 10 MG tablet, Take 1 tablet by mouth Daily., Disp: 90 tablet, Rfl: 3    Cyanocobalamin (B-12) 1000 MCG capsule, Take  by mouth., Disp: , Rfl:     cyclobenzaprine (FLEXERIL) 10 MG tablet, TAKE 1 TABLET BY MOUTH 3 TIMES A DAY AS NEEDED FOR MUSCLE SPASMS., Disp: 90 tablet, Rfl: 1    Diclofenac Sodium (VOLTAREN) 1 % gel gel, Apply 4 g topically to the appropriate area as directed 4 (Four) Times a Day As Needed (muscle pain)., Disp: 50 g, Rfl: 6    diphenhydrAMINE HCl (BENADRYL ALLERGY PO), Take  by mouth., Disp: , Rfl:     gabapentin (NEURONTIN) 600 MG tablet, Take 1 tablet by mouth 3 (Three) Times  a Day for 180 days., Disp: 270 tablet, Rfl: 1    glimepiride (AMARYL) 2 MG tablet, Take 1 tablet by mouth Daily., Disp: 90 tablet, Rfl: 1    glucose blood test strip, 1 each by Other route 3 times a day. Use as instructed, Disp: 100 each, Rfl: 3    Insulin Pen Needle (B-D UF III MINI PEN NEEDLES) 31G X 5 MM misc, Inject 1 each under the skin into the appropriate area as directed See Admin Instructions. Three times daily, Disp: 100 each, Rfl: 6    lisinopril (PRINIVIL,ZESTRIL) 20 MG tablet, Take 1 tablet by mouth Daily., Disp: 90 tablet, Rfl: 1    MAGnesium-Oxide 400 (240 Mg) MG tablet, Take 1 tablet by mouth twice daily, Disp: 60 tablet, Rfl: 0    naloxone (NARCAN) 4 MG/0.1ML nasal spray, 1 spray into the nostril(s) as directed by provider As Needed (per overdose)., Disp: 2 each, Rfl: 2    pantoprazole (PROTONIX) 40 MG EC tablet, Take 1 tablet by mouth Daily., Disp: 90 tablet, Rfl: 1    phenazopyridine (Pyridium) 100 MG tablet, Take 1 tablet by mouth 3 (Three) Times a Day As Needed for Bladder Spasms., Disp: 6 tablet, Rfl: 0    polyethylene glycol (MIRALAX) 17 g packet, Take 17 g by mouth Daily., Disp: 14 each, Rfl: 0    promethazine (PHENERGAN) 12.5 MG tablet, 1-2 q6h prn, Disp: 60 tablet, Rfl: 2    SITagliptin (Januvia) 50 MG tablet, Take 1 tablet by mouth Daily., Disp: 30 tablet, Rfl: 3    traMADol (ULTRAM) 50 MG tablet, Take 1 tablet by mouth Every 8 (Eight) Hours As Needed for Moderate Pain., Disp: 90 tablet, Rfl: 0    Triamcinolone Acetonide (Nasacort Allergy 24HR) 55 MCG/ACT nasal inhaler, 2 sprays into the nostril(s) as directed by provider Daily., Disp: 16.5 g, Rfl: 2    vitamin D (ERGOCALCIFEROL) 1.25 MG (88824 UT) capsule capsule, Take 1 capsule by mouth Every 7 (Seven) Days., Disp: 12 capsule, Rfl: 1    The following portions of the patient's history were reviewed and updated as appropriate: allergies, current medications, past family history, past medical history, past social history, past surgical  history, and problem list.      REVIEW OF PERTINENT MEDICAL DATA    Past Medical History:   Diagnosis Date    Allergic     Ankle sprain ??    Arthritis     Arthritis of back     Cancer     Pancreatitis    Cholelithiasis 2023    Chronic pain disorder     Colon polyp 5years    Diabetes mellitus     Extremity pain     GERD (gastroesophageal reflux disease)     Headache     Headache, tension-type     Histoplasmosis     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    Hyperlipidemia     Hypertension     Irritable bowel syndrome     Joint pain     Knee pain, left     Knee swelling     Low back pain     Low back strain     Migraine     Murmur     Need for influenza vaccination 2023    Obesity     Rib pain     left side    Rotator cuff syndrome     Seasonal allergies     Tennis elbow      Past Surgical History:   Procedure Laterality Date     SECTION      CHOLECYSTECTOMY N/A 10/27/2023    Procedure: CHOLECYSTECTOMY LAPAROSCOPIC WITH [employer] ROB[employer];  Surgeon: Mario Alexander MD;  Location: [facility] MAIN OR;  Service: Robotics - DaVinci;  Laterality: N/A;    COLONOSCOPY      ELBOW PROCEDURE      HAND SURGERY      JOINT REPLACEMENT  2023    Left shoulder    LUNG BIOPSY      ORTHOPEDIC SURGERY      SHOULDER SURGERY      SUBTOTAL HYSTERECTOMY      TONSILLECTOMY      TOTAL SHOULDER ARTHROPLASTY W/ DISTAL CLAVICLE EXCISION Left 02/15/2023    Procedure: TOTAL SHOULDER REVERSE ARTHROPLASTY;  Surgeon: Willian Gomez MD;  Location: [facility] MAIN OR;  Service: Orthopedics;  Laterality: Left;    UPPER ENDOSCOPIC ULTRASOUND W/ FNA N/A 2024    Procedure: ENDOSCOPIC ULTRASOUND WITH FINE NEEDLE ASPIRATION;  Surgeon: Arleth Blood MD;  Location: [facility] ENDOSCOPY;  Service: Gastroenterology;  Laterality: N/A;  post: pancreatic body mass and hiatal hernia     Family History   Problem Relation Age of Onset    Hypertension Mother     Heart disease Mother     Arthritis Mother     Cancer Mother     Thyroid  disease Mother     Diabetes Father     Hyperlipidemia Father     No Known Problems Sister     No Known Problems Sister     No Known Problems Sister     No Known Problems Brother     No Known Problems Brother     No Known Problems Brother     No Known Problems Brother     No Known Problems Son     Migraines Sister      Social History     Socioeconomic History    Marital status:    Tobacco Use    Smoking status: Every Day     Current packs/day: 1.00     Average packs/day: 1 pack/day for 30.4 years (30.4 ttl pk-yrs)     Types: Cigarettes     Start date: 1/1/1995     Passive exposure: Current    Smokeless tobacco: Never   Vaping Use    Vaping status: Never Used   Substance and Sexual Activity    Alcohol use: Never    Drug use: Never    Sexual activity: Not Currently     Partners: Male     Birth control/protection: None         Review of Systems   Musculoskeletal:  Positive for arthralgias, back pain and gait problem.         Vitals:    05/20/25 0908   BP: 118/74   Pulse: 91   Resp: 16   SpO2: 96%   Weight: 95.3 kg (210 lb)   PainSc: 5              Objective   Physical Exam  Musculoskeletal:         General: Tenderness present.        Arms:         Legs:            Imaging Reviewed:    MRI thoracic spine without contrast-11/14/2024  - Minimal Modic type I endplate changes anteriorly at T6-T7  - No significant disc herniation, canal stenosis or neuroforaminal narrowing    Thoracic x-ray-9/4/2024  - Mild degenerative changes of thoracic spine and diminished disc space at T10-11 and T11-12     MRI abdomen-6/2024  - Tiny cystic lesion in the pancreatic tail without suspicious enhancement or other definable enhancing lesion in patient with history of biopsy-proven well-differentiated pancreatic neuroendocrine tumor.  Small cystic neuroendocrine tumor or sidebranch IPMN can have this appearance.  Recommend continued surveillance.     MRI pelvis- 2021:   1.High-grade partial to full-thickness tear of the conjoined tendon  of the left hamstring origin with some preservation of the semimembranosus contribution.   2.Associated left ischiofemoral impingement with intramuscular edema and partial thickness tearing of the quadratus femoris musculature and concomitant adventitious bursitis.   3.Large lipoma arising from the left piriformis musculature measuring up to 9.3 cm, which extends through the greater sciatic notch into the left hemipelvis. Associated mild posterior displacement of the left sciatic nerve with preserved nerve signal and morphology.   4.Mild degenerative change of the hips bilaterally.     Assessment:    1. Malignant neoplasm of tail of pancreas    2. Primary osteoarthritis of left knee    3. High risk medication use    4. Piriformis syndrome of left side    5. Primary osteoarthritis of right knee           Plan:   1.  UDS on 12/10/2024 is consistent with patient's interview and positive for Xanax.  Negative for tramadol as she takes it sparingly.  Narcotic contract has been signed.  Advised patient not to take Xanax while taking tramadol.  2.  Gabapentin currently is providing some relief without significant side effects. Continue Gabapentin 600 mg TID-11/16/25. Side effects discussed with the patient including but not limited to somnolence, dizziness, ataxia, headache, fatigue, blurred vision, cold or flu-like symptoms,delusions, hoarseness, lack or loss of strength, lower back or side pain, swelling of the hands, feet, or lower legs trembling or shaking. Patient understands and agrees with the plan.  3.  She has been taking intermittent tramadol from PCP and request prescription of tramadol.  Continue Tramadol 50 mg as needed-90 tablets sent on 12/10/2024. Side effects discussed including but not limited to nausea, vomiting, constipation, lightheadedness, dizziness, drowsiness, or headache. This medication may increase serotonin and rarely cause a very serious condition called serotonin syndrome/ toxicity. Patient  counseled on FDA black box warning of increased risk for morbidity and mortality with concomittent use of benzodiazepine medications and opioid medications. Patient counseled not to take these medications within 6 hours of each other.  4. Discussed with patient that her left-sided hip/back pain is multifactorial in nature with history of full-thickness tear of hamstring and having large lipoma from left piriformis muscle.  She does have some symptoms of left piriformis syndrome and may benefit from left piriformis injection. Patient has pain in the left buttock area with referred pain in the leg.  Some relief with left-sided piriformis injection.  May consider repeating in future if needed.  5. Patient has pain in the right knee and the Xray shows degenerative changes. Discussed intra articular RIGHT knee injection. Discussed the possibility of infection, bleeding, nerve damage, headache, increased pain, paraplegia. Patient understands and agrees.       RTC for R Knee injection in office and then 4-5 weeks follow up.      Vadim Carlisle DO  Pain Management   [facility]       INSPECT REPORT    As part of the patient's treatment plan, I may be prescribing controlled substances. The patient has been made aware of appropriate use of such medications, including potential risk of somnolence, limited ability to drive and/or work safely, and the potential for dependence or overdose. It has also been made clear that these medications are for use by this patient only, without concomitant use of alcohol or other substances unless prescribed.     Patient has completed prescribing agreement detailing terms of continued prescribing of controlled substances, including monitoring INSPECT reports, urine drug screening, and pill counts if necessary. The patient is aware that inappropriate use will results in cessation of prescribing such medications.    INSPECT report has been reviewed and scanned into the patient's  chart.

## 2025-05-20 ENCOUNTER — OFFICE VISIT (OUTPATIENT)
Dept: PAIN MEDICINE | Facility: CLINIC | Age: 65
End: 2025-05-20
Payer: MEDICARE

## 2025-05-20 VITALS
DIASTOLIC BLOOD PRESSURE: 74 MMHG | HEART RATE: 91 BPM | WEIGHT: 210 LBS | SYSTOLIC BLOOD PRESSURE: 118 MMHG | OXYGEN SATURATION: 96 % | RESPIRATION RATE: 16 BRPM | BODY MASS INDEX: 34.95 KG/M2

## 2025-05-20 DIAGNOSIS — M17.11 PRIMARY OSTEOARTHRITIS OF RIGHT KNEE: ICD-10-CM

## 2025-05-20 DIAGNOSIS — C25.2 MALIGNANT NEOPLASM OF TAIL OF PANCREAS: Primary | ICD-10-CM

## 2025-05-20 DIAGNOSIS — M17.12 PRIMARY OSTEOARTHRITIS OF LEFT KNEE: ICD-10-CM

## 2025-05-20 DIAGNOSIS — Z79.899 HIGH RISK MEDICATION USE: ICD-10-CM

## 2025-05-20 DIAGNOSIS — G57.02 PIRIFORMIS SYNDROME OF LEFT SIDE: ICD-10-CM

## 2025-05-20 RX ORDER — GABAPENTIN 600 MG/1
600 TABLET ORAL 3 TIMES DAILY
Qty: 270 TABLET | Refills: 1 | Status: SHIPPED | OUTPATIENT
Start: 2025-05-20 | End: 2025-11-16

## 2025-05-27 ENCOUNTER — PROCEDURE VISIT (OUTPATIENT)
Dept: PAIN MEDICINE | Facility: CLINIC | Age: 65
End: 2025-05-27
Payer: MEDICARE

## 2025-05-27 VITALS
RESPIRATION RATE: 16 BRPM | BODY MASS INDEX: 35.11 KG/M2 | HEART RATE: 91 BPM | SYSTOLIC BLOOD PRESSURE: 112 MMHG | OXYGEN SATURATION: 97 % | DIASTOLIC BLOOD PRESSURE: 74 MMHG | WEIGHT: 211 LBS

## 2025-05-27 DIAGNOSIS — M17.11 PRIMARY OSTEOARTHRITIS OF RIGHT KNEE: Primary | ICD-10-CM

## 2025-05-27 PROCEDURE — 20610 DRAIN/INJ JOINT/BURSA W/O US: CPT | Performed by: STUDENT IN AN ORGANIZED HEALTH CARE EDUCATION/TRAINING PROGRAM

## 2025-05-27 RX ORDER — METHYLPREDNISOLONE ACETATE 40 MG/ML
40 INJECTION, SUSPENSION INTRA-ARTICULAR; INTRALESIONAL; INTRAMUSCULAR; SOFT TISSUE ONCE
Status: COMPLETED | OUTPATIENT
Start: 2025-05-27 | End: 2025-05-27

## 2025-05-27 RX ORDER — BUPIVACAINE HYDROCHLORIDE 2.5 MG/ML
5 INJECTION, SOLUTION INFILTRATION; PERINEURAL ONCE
Status: COMPLETED | OUTPATIENT
Start: 2025-05-27 | End: 2025-05-27

## 2025-05-27 RX ADMIN — METHYLPREDNISOLONE ACETATE 40 MG: 40 INJECTION, SUSPENSION INTRA-ARTICULAR; INTRALESIONAL; INTRAMUSCULAR; SOFT TISSUE at 10:41

## 2025-05-27 RX ADMIN — BUPIVACAINE HYDROCHLORIDE 5 ML: 2.5 INJECTION, SOLUTION INFILTRATION; PERINEURAL at 10:41

## 2025-05-27 NOTE — PROGRESS NOTES
H and P reviewed from previous visit and no changes to patient's clinical presentation.     Vadim Carlisle DO  Pain Management   [facility]     PREOPERATIVE DIAGNOSIS:    1. Right Knee pain     POSTOPERATIVE DIAGNOSIS:  Same    PROCEDURE Right Intra articular knee injection.    PROCEDURE NOTE:  After obtaining written informed consent patient was taken to the procedure room. Pre-procedure blood pressure and pulse were stable and recorded in patients clinic chart.     The patient was placed in a sitting position and the right knee was prepped with chloraprep and draped in the usual sterile fashion.  The skin over the right patella was identified. The skin was infilterated with 1% lidoacaine using 25 g needle. 22-gauge-1.5-inch needle was inserted into the knee joint from the medial side.  Following the negative aspiration, 5 ml of 0.25 % bupivacaine with 40 mg of kenalog was injected without any complications.     After the procedure the needle was removed and sterile band-aid was placed.    Following the procedure the patient's vital signs were stable. The patient was discharged home in good condition after being given discharge instructions.      COMPLICATIONS: None

## 2025-06-05 ENCOUNTER — HOSPITAL ENCOUNTER (OUTPATIENT)
Dept: CT IMAGING | Facility: HOSPITAL | Age: 65
Discharge: HOME | End: 2025-06-05
Admitting: NURSE PRACTITIONER
Payer: MEDICARE

## 2025-06-05 DIAGNOSIS — F17.200 SMOKING ADDICTION: ICD-10-CM

## 2025-06-05 DIAGNOSIS — Z87.891 PERSONAL HISTORY OF NICOTINE DEPENDENCE: ICD-10-CM

## 2025-06-05 PROCEDURE — 71271 CT THORAX LUNG CANCER SCR C-: CPT

## 2025-06-13 RX ORDER — MAGNESIUM OXIDE TAB 400 MG (241.3 MG ELEMENTAL MG) 400 (241.3 MG) MG
1 TAB ORAL 2 TIMES DAILY
Qty: 60 TABLET | Refills: 3 | Status: SHIPPED | OUTPATIENT
Start: 2025-06-13

## 2025-06-23 NOTE — PROGRESS NOTES
Subjective   Judy Koehler is a 64 y.o. female is here for follow up for left-sided rib pain and left hip pain.  Patient was last seen for right knee intra-articular steroid injection. Increased b/l feet neuropathy pain. Trying control DM-2 after insurance denied ozempic.     On last visit:     Mid left sided rib pain is 0/10 on VAS, at maximum is 1/10. Pain is soreness, sharp, stabbing in nature. Pain is referred left breast, mid back. The pain is constant. The pain is improved by sitting on couch with heating pad, some pain relief with muscle relaxants and gabapentin, celiac plexus block. The pain is worse with house work, increased activities, lifting. Trouble sleeping due to pain     Left hip pain is 3/10  on VAS. Hx of hamstring tear 2021. She has seen ortho who recommended conservative management. Worse with sitting for too long or walking too long.     Left knee pain is 4/10 on VAS. Worse with weight bearing. Improved pain relief with steroid injections last year with Orthopedics.     R knee pain is 4/10 on VAS. Dull, achy pain. Worse with weight bearing.        Previous Injection:   5/27/2025-right knee intra-articular steroid injection- 40% pain relief with functional improvement.   5/1/2025-left-sided celiac plexus block- 100% pain relief with left sided ribs pain.   4/15/2025-left knee intra-articular steroid injection - 50% pain relief.   11/14/2024-left piriformis injection-60% pain relief overall.    Hx: Referred by  Debbie Phillips APRN for mid back pain.Mid back pain started about 6 moths ago and L hip pain started about 3 years ago. L hip pain started after a fall.        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     Current Medications:   Tramadol  Flexeril 10 mg  Tylenol  Gabapentin 300 mg BID  Xanax 0.5 mg daily as needed        Past Medications:     Past Modalities:  TENS:                                                                          no                                                  Physical Therapy Within The Last 6 Months              no  Psychotherapy                                                            no  Massage Therapy                                                       no     Patient Complains Of:  Uro-Fecal Incontinence          no  Weight Gain/Loss                   no  Fever/Chills                             no  Weakness                               no        PEG Assessment   What number best describes your pain on average in the past week?6  What number best describes how, during the past week, pain has interfered with your enjoyment of life?6  What number best describes how, during the past week, pain has interfered with your general activity?  6    Current Outpatient Medications:     acetaminophen (TYLENOL) 325 MG tablet, Take 2 tablets by mouth Every 6 (Six) Hours As Needed for Mild Pain., Disp: , Rfl:     atorvastatin (LIPITOR) 20 MG tablet, Take 1 tablet by mouth Daily., Disp: 90 tablet, Rfl: 1    Blood Glucose Monitoring Suppl (Blood Glucose Monitor System) w/Device kit, Check blood sugars tid, Disp: 1 each, Rfl: 0    cetirizine (zyrTEC) 10 MG tablet, Take 1 tablet by mouth Daily., Disp: 90 tablet, Rfl: 3    Cyanocobalamin (B-12) 1000 MCG capsule, Take  by mouth., Disp: , Rfl:     cyclobenzaprine (FLEXERIL) 10 MG tablet, TAKE 1 TABLET BY MOUTH 3 TIMES A DAY AS NEEDED FOR MUSCLE SPASMS., Disp: 90 tablet, Rfl: 1    Diclofenac Sodium (VOLTAREN) 1 % gel gel, Apply 4 g topically to the appropriate area as directed 4 (Four) Times a Day As Needed (muscle pain).,  Disp: 50 g, Rfl: 6    diphenhydrAMINE HCl (BENADRYL ALLERGY PO), Take  by mouth., Disp: , Rfl:     gabapentin (NEURONTIN) 600 MG tablet, Take 1 tablet by mouth 3 (Three) Times a Day for 180 days., Disp: 270 tablet, Rfl: 1    glimepiride (AMARYL) 2 MG tablet, Take 1 tablet by mouth Daily., Disp: 90 tablet, Rfl: 1    glucose blood test strip, 1 each by Other route 3 times a day. Use as instructed, Disp: 100 each, Rfl: 3    Insulin Pen Needle (B-D UF III MINI PEN NEEDLES) 31G X 5 MM misc, Inject 1 each under the skin into the appropriate area as directed See Admin Instructions. Three times daily, Disp: 100 each, Rfl: 6    lisinopril (PRINIVIL,ZESTRIL) 20 MG tablet, Take 1 tablet by mouth Daily., Disp: 90 tablet, Rfl: 1    Magnesium Oxide -Mg Supplement (MAGnesium-Oxide) 400 (240 Mg) MG tablet, Take 1 tablet by mouth twice daily, Disp: 60 tablet, Rfl: 3    metFORMIN (GLUCOPHAGE) 500 MG tablet, Take 1 tablet by mouth 2 (Two) Times a Day With Meals., Disp: 60 tablet, Rfl: 2    naloxone (NARCAN) 4 MG/0.1ML nasal spray, 1 spray into the nostril(s) as directed by provider As Needed (per overdose)., Disp: 2 each, Rfl: 2    pantoprazole (PROTONIX) 40 MG EC tablet, Take 1 tablet by mouth Daily., Disp: 90 tablet, Rfl: 1    phenazopyridine (Pyridium) 100 MG tablet, Take 1 tablet by mouth 3 (Three) Times a Day As Needed for Bladder Spasms., Disp: 6 tablet, Rfl: 0    polyethylene glycol (MIRALAX) 17 g packet, Take 17 g by mouth Daily., Disp: 14 each, Rfl: 0    promethazine (PHENERGAN) 12.5 MG tablet, 1-2 q6h prn, Disp: 60 tablet, Rfl: 2    SITagliptin (Januvia) 100 MG tablet, Take 1 tablet by mouth Daily., Disp: 30 tablet, Rfl: 3    Triamcinolone Acetonide (Nasacort Allergy 24HR) 55 MCG/ACT nasal inhaler, 2 sprays into the nostril(s) as directed by provider Daily., Disp: 16.5 g, Rfl: 2    vitamin D (ERGOCALCIFEROL) 1.25 MG (44098 UT) capsule capsule, Take 1 capsule by mouth Every 7 (Seven) Days., Disp: 12 capsule, Rfl: 1     Ibuprofen 3 %, Gabapentin 10 %, Baclofen 2 %, lidocaine 4 %, Ketamine HCl 4 %, Apply 1-2 g topically to the appropriate area as directed 3 (Three) to 4 (Four) times daily., Disp: 90 g, Rfl: 5    traMADol (ULTRAM) 50 MG tablet, Take 1 tablet by mouth Every 8 (Eight) Hours As Needed for Moderate Pain., Disp: 90 tablet, Rfl: 2    The following portions of the patient's history were reviewed and updated as appropriate: allergies, current medications, past family history, past medical history, past social history, past surgical history, and problem list.      REVIEW OF PERTINENT MEDICAL DATA    Past Medical History:   Diagnosis Date    Allergic     Ankle sprain ??    Arthritis     Arthritis of back     Cancer     Pancreatitis    Cholelithiasis 2023    Chronic pain disorder     Colon polyp 5years    Diabetes mellitus     Extremity pain     GERD (gastroesophageal reflux disease)     Headache     Headache, tension-type     Histoplasmosis     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    Hyperlipidemia     Hypertension     Irritable bowel syndrome     Joint pain     Knee pain, left     Knee swelling     Low back pain     Low back strain     Migraine     Murmur     Need for influenza vaccination 2023    Obesity     Rib pain     left side    Rotator cuff syndrome     Seasonal allergies     Tennis elbow      Past Surgical History:   Procedure Laterality Date     SECTION      CHOLECYSTECTOMY N/A 10/27/2023    Procedure: CHOLECYSTECTOMY LAPAROSCOPIC WITH [employer] ROBOT;  Surgeon: Mario Alexander MD;  Location: [facility];  Service: Robotics - [facility];  Laterality: N/A;    COLONOSCOPY      ELBOW PROCEDURE      HAND SURGERY      JOINT REPLACEMENT  2023    Left shoulder    LUNG BIOPSY      ORTHOPEDIC SURGERY      SHOULDER SURGERY      SUBTOTAL HYSTERECTOMY      TONSILLECTOMY      TOTAL SHOULDER ARTHROPLASTY W/ DISTAL CLAVICLE EXCISION Left 02/15/2023    Procedure: TOTAL SHOULDER REVERSE ARTHROPLASTY;   Surgeon: Willian Gomez MD;  Location: [facility] MAIN OR;  Service: Orthopedics;  Laterality: Left;    UPPER ENDOSCOPIC ULTRASOUND W/ FNA N/A 05/30/2024    Procedure: ENDOSCOPIC ULTRASOUND WITH FINE NEEDLE ASPIRATION;  Surgeon: Arleth Blood MD;  Location: [facility] ENDOSCOPY;  Service: Gastroenterology;  Laterality: N/A;  post: pancreatic body mass and hiatal hernia     Family History   Problem Relation Age of Onset    Hypertension Mother     Heart disease Mother     Arthritis Mother     Cancer Mother     Thyroid disease Mother     Diabetes Father     Hyperlipidemia Father     No Known Problems Sister     No Known Problems Sister     No Known Problems Sister     No Known Problems Brother     No Known Problems Brother     No Known Problems Brother     No Known Problems Brother     No Known Problems Son     Migraines Sister      Social History     Socioeconomic History    Marital status:    Tobacco Use    Smoking status: Every Day     Current packs/day: 1.00     Average packs/day: 1 pack/day for 30.5 years (30.5 ttl pk-yrs)     Types: Cigarettes     Start date: 1/1/1995     Passive exposure: Current    Smokeless tobacco: Never   Vaping Use    Vaping status: Never Used   Substance and Sexual Activity    Alcohol use: Never    Drug use: Never    Sexual activity: Not Currently     Partners: Male     Birth control/protection: None         Review of Systems   Musculoskeletal:  Positive for arthralgias, back pain and gait problem.         Vitals:    06/24/25 0839   BP: 120/78   Pulse: 81   Resp: 16   SpO2: 96%   Weight: 97.5 kg (215 lb)   PainSc: 4                Objective   Physical Exam  Musculoskeletal:         General: Tenderness present.        Arms:         Legs:            Imaging Reviewed:    MRI thoracic spine without contrast-11/14/2024  - Minimal Modic type I endplate changes anteriorly at T6-T7  - No significant disc herniation, canal stenosis or neuroforaminal narrowing    Thoracic x-ray-9/4/2024  -  Mild degenerative changes of thoracic spine and diminished disc space at T10-11 and T11-12     MRI abdomen-6/2024  - Tiny cystic lesion in the pancreatic tail without suspicious enhancement or other definable enhancing lesion in patient with history of biopsy-proven well-differentiated pancreatic neuroendocrine tumor.  Small cystic neuroendocrine tumor or sidebranch IPMN can have this appearance.  Recommend continued surveillance.     MRI pelvis- 2021:   1.High-grade partial to full-thickness tear of the conjoined tendon of the left hamstring origin with some preservation of the semimembranosus contribution.   2.Associated left ischiofemoral impingement with intramuscular edema and partial thickness tearing of the quadratus femoris musculature and concomitant adventitious bursitis.   3.Large lipoma arising from the left piriformis musculature measuring up to 9.3 cm, which extends through the greater sciatic notch into the left hemipelvis. Associated mild posterior displacement of the left sciatic nerve with preserved nerve signal and morphology.   4.Mild degenerative change of the hips bilaterally.     Assessment:    1. Primary osteoarthritis of right knee    2. Osteoarthritis of left shoulder, unspecified osteoarthritis type    3. Malignant neoplasm of tail of pancreas    4. Primary osteoarthritis of left knee    5. High risk medication use    6. Painful diabetic neuropathy         Plan:   1.  Repeat UDS-6/24/2025.  UDS on 12/10/2024 is consistent with patient's interview and positive for Xanax.  Negative for tramadol as she takes it sparingly.  Narcotic contract has been signed.  Advised patient not to take Xanax while taking tramadol.  2.  Gabapentin currently is providing some relief without significant side effects. Continue Gabapentin 600 mg TID-11/16/25. Side effects discussed with the patient including but not limited to somnolence, dizziness, ataxia, headache, fatigue, blurred vision, cold or flu-like  symptoms,delusions, hoarseness, lack or loss of strength, lower back or side pain, swelling of the hands, feet, or lower legs trembling or shaking. Patient understands and agrees with the plan.  3.  She has been taking intermittent tramadol from PCP and request prescription of tramadol.  Continue Tramadol 50 mg as needed-90 tablets sent on 6/24/25. Side effects discussed including but not limited to nausea, vomiting, constipation, lightheadedness, dizziness, drowsiness, or headache. This medication may increase serotonin and rarely cause a very serious condition called serotonin syndrome/ toxicity. Patient counseled on FDA black box warning of increased risk for morbidity and mortality with concomittent use of benzodiazepine medications and opioid medications. Patient counseled not to take these medications within 6 hours of each other.  4. Discussed with patient that her left-sided hip/back pain is multifactorial in nature with history of full-thickness tear of hamstring and having large lipoma from left piriformis muscle.  She does have some symptoms of left piriformis syndrome and may benefit from left piriformis injection. Patient has pain in the left buttock area with referred pain in the leg.  Some relief with left-sided piriformis injection.  May consider repeating in future if needed.  5. Some relief with steroid injection of knee. May consider synvisc injections.   6. Start topical biomed cream with 5% Ketamine. Apply three times per day as needed as needed to painful areas, do not apply to open skin, genitalia or mucosa. Possible side effects may include irritation at the application site- burning, itching, rash and redness or allergic reaction. Patient understands and agrees with the plan.        RTC in 3 months.      Vadim Carlisle DO  Pain Management   [facility]       INSPECT REPORT    As part of the patient's treatment plan, I may be prescribing controlled substances. The patient has been made  aware of appropriate use of such medications, including potential risk of somnolence, limited ability to drive and/or work safely, and the potential for dependence or overdose. It has also been made clear that these medications are for use by this patient only, without concomitant use of alcohol or other substances unless prescribed.     Patient has completed prescribing agreement detailing terms of continued prescribing of controlled substances, including monitoring INSPECT reports, urine drug screening, and pill counts if necessary. The patient is aware that inappropriate use will results in cessation of prescribing such medications.    INSPECT report has been reviewed and scanned into the patient's chart.

## 2025-06-24 ENCOUNTER — OFFICE VISIT (OUTPATIENT)
Dept: PAIN MEDICINE | Facility: CLINIC | Age: 65
End: 2025-06-24
Payer: MEDICARE

## 2025-06-24 VITALS
DIASTOLIC BLOOD PRESSURE: 78 MMHG | SYSTOLIC BLOOD PRESSURE: 120 MMHG | WEIGHT: 215 LBS | RESPIRATION RATE: 16 BRPM | OXYGEN SATURATION: 96 % | BODY MASS INDEX: 35.78 KG/M2 | HEART RATE: 81 BPM

## 2025-06-24 DIAGNOSIS — M17.12 PRIMARY OSTEOARTHRITIS OF LEFT KNEE: ICD-10-CM

## 2025-06-24 DIAGNOSIS — M19.012 OSTEOARTHRITIS OF LEFT SHOULDER, UNSPECIFIED OSTEOARTHRITIS TYPE: ICD-10-CM

## 2025-06-24 DIAGNOSIS — Z79.899 HIGH RISK MEDICATION USE: ICD-10-CM

## 2025-06-24 DIAGNOSIS — E11.40 PAINFUL DIABETIC NEUROPATHY: ICD-10-CM

## 2025-06-24 DIAGNOSIS — M17.11 PRIMARY OSTEOARTHRITIS OF RIGHT KNEE: Primary | ICD-10-CM

## 2025-06-24 DIAGNOSIS — C25.2 MALIGNANT NEOPLASM OF TAIL OF PANCREAS: ICD-10-CM

## 2025-06-24 DIAGNOSIS — Z79.899 HIGH RISK MEDICATION USE: Primary | ICD-10-CM

## 2025-06-24 PROCEDURE — 1160F RVW MEDS BY RX/DR IN RCRD: CPT | Performed by: STUDENT IN AN ORGANIZED HEALTH CARE EDUCATION/TRAINING PROGRAM

## 2025-06-24 PROCEDURE — 1159F MED LIST DOCD IN RCRD: CPT | Performed by: STUDENT IN AN ORGANIZED HEALTH CARE EDUCATION/TRAINING PROGRAM

## 2025-06-24 PROCEDURE — 1125F AMNT PAIN NOTED PAIN PRSNT: CPT | Performed by: STUDENT IN AN ORGANIZED HEALTH CARE EDUCATION/TRAINING PROGRAM

## 2025-06-24 PROCEDURE — 3078F DIAST BP <80 MM HG: CPT | Performed by: STUDENT IN AN ORGANIZED HEALTH CARE EDUCATION/TRAINING PROGRAM

## 2025-06-24 PROCEDURE — 3074F SYST BP LT 130 MM HG: CPT | Performed by: STUDENT IN AN ORGANIZED HEALTH CARE EDUCATION/TRAINING PROGRAM

## 2025-06-24 PROCEDURE — 99214 OFFICE O/P EST MOD 30 MIN: CPT | Performed by: STUDENT IN AN ORGANIZED HEALTH CARE EDUCATION/TRAINING PROGRAM

## 2025-06-24 RX ORDER — TRAMADOL HYDROCHLORIDE 50 MG/1
50 TABLET ORAL EVERY 8 HOURS PRN
Qty: 90 TABLET | Refills: 2 | Status: SHIPPED | OUTPATIENT
Start: 2025-06-24

## 2025-07-01 ENCOUNTER — OFFICE VISIT (OUTPATIENT)
Dept: FAMILY MEDICINE CLINIC | Facility: CLINIC | Age: 65
End: 2025-07-01
Payer: MEDICARE

## 2025-07-01 VITALS
HEART RATE: 90 BPM | OXYGEN SATURATION: 96 % | WEIGHT: 211 LBS | BODY MASS INDEX: 35.16 KG/M2 | HEIGHT: 65 IN | TEMPERATURE: 97.7 F | SYSTOLIC BLOOD PRESSURE: 116 MMHG | DIASTOLIC BLOOD PRESSURE: 75 MMHG

## 2025-07-01 DIAGNOSIS — L98.9 SKIN LESION OF FACE: ICD-10-CM

## 2025-07-01 DIAGNOSIS — E78.2 MIXED HYPERLIPIDEMIA: ICD-10-CM

## 2025-07-01 DIAGNOSIS — M79.89 LEG SWELLING: Primary | ICD-10-CM

## 2025-07-01 DIAGNOSIS — E11.65 TYPE 2 DIABETES MELLITUS WITH HYPERGLYCEMIA, WITHOUT LONG-TERM CURRENT USE OF INSULIN: ICD-10-CM

## 2025-07-01 DIAGNOSIS — Z12.31 ENCOUNTER FOR SCREENING MAMMOGRAM FOR MALIGNANT NEOPLASM OF BREAST: ICD-10-CM

## 2025-07-01 DIAGNOSIS — Z78.0 POST-MENOPAUSAL: ICD-10-CM

## 2025-07-01 PROCEDURE — 3051F HG A1C>EQUAL 7.0%<8.0%: CPT | Performed by: NURSE PRACTITIONER

## 2025-07-01 PROCEDURE — 1125F AMNT PAIN NOTED PAIN PRSNT: CPT | Performed by: NURSE PRACTITIONER

## 2025-07-01 PROCEDURE — 93000 ELECTROCARDIOGRAM COMPLETE: CPT | Performed by: NURSE PRACTITIONER

## 2025-07-01 PROCEDURE — 3074F SYST BP LT 130 MM HG: CPT | Performed by: NURSE PRACTITIONER

## 2025-07-01 PROCEDURE — 99214 OFFICE O/P EST MOD 30 MIN: CPT | Performed by: NURSE PRACTITIONER

## 2025-07-01 PROCEDURE — 3078F DIAST BP <80 MM HG: CPT | Performed by: NURSE PRACTITIONER

## 2025-07-01 RX ORDER — LISINOPRIL AND HYDROCHLOROTHIAZIDE 10; 12.5 MG/1; MG/1
1 TABLET ORAL DAILY
Qty: 30 TABLET | Refills: 3 | Status: SHIPPED | OUTPATIENT
Start: 2025-07-01

## 2025-07-01 NOTE — PROGRESS NOTES
Subjective   Judy Koehler is a 65 y.o. female presents for   Chief Complaint   Patient presents with    Foot Swelling       Health Maintenance Due   Topic Date Due    DIABETIC FOOT EXAM  Never done    ZOSTER VACCINE (1 of 2) Never done    ANNUAL WELLNESS VISIT  Never done    TDAP/TD VACCINES (3 - Td or Tdap) 04/19/2023    URINE MICROALBUMIN-CREATININE RATIO (uACR)  12/12/2024    DIABETIC EYE EXAM  01/12/2025    DXA SCAN  04/03/2025    INFLUENZA VACCINE  07/01/2025    HEMOGLOBIN A1C  08/04/2025       History of Present Illness  The patient presents for evaluation of swelling in her feet, tingling, and pain.    She reports experiencing swelling in her feet, accompanied by tingling and pain. The swelling has been intermittent for a few months but has become a daily occurrence over the past couple of weeks. She has gained 20 pounds recently, which she believes may be contributing to her symptoms. The swelling intensifies throughout the day, particularly in her left foot. She also mentions a ridge on the top of her foot. She has been attempting to reduce her salt intake. She spends a significant amount of time sitting with her legs down. She reports no chest pain or shortness of breath. She has been using a pair of wider shoes in the afternoon to accommodate the swelling. She has been applying a pain compound cream prescribed by Dr. Carlisle at night, which seems to alleviate the discomfort on the top of her foot. She has a history of arthritis and underwent x-rays years ago.    The tingling and numbness in her toes have been present for a few weeks. She describes the sensation as a combination of numbness and tingling, which becomes more noticeable when she moves. She is currently on gabapentin for pain management. She also reports a feeling of numbness when she rotates her ankles. She has noticed an increase in varicose veins over the past couple of months.    She has another place coming up on her nose and would like to  "see a dermatologist. She has not been to one before. One spot has been there for about 4 to 5 years, and the other one appeared a few years ago. The one on the end is kind of red now. They do not come and go; they stay. They are not itchy.    She is due for her DEXA scan and plans to check with her insurance to ensure coverage. She is also due for her mammogram in 08/2025.    She had a lipoma on her left side and a hamstring tear that never healed, causing pain on her left side and hip.    PAST SURGICAL HISTORY:  She had surgery on her shoulder and gallbladder in 2023.    SOCIAL HISTORY  She admits to smoking.    Vitals:    07/01/25 0809   BP: 116/75   BP Location: Right arm   Patient Position: Sitting   Cuff Size: Adult   Pulse: 90   Temp: 97.7 °F (36.5 °C)   TempSrc: Infrared   SpO2: 96%   Weight: 95.7 kg (211 lb)   Height: 165.1 cm (65\")     Body mass index is 35.11 kg/m².    Current Outpatient Medications on File Prior to Visit   Medication Sig Dispense Refill    acetaminophen (TYLENOL) 325 MG tablet Take 2 tablets by mouth Every 6 (Six) Hours As Needed for Mild Pain.      atorvastatin (LIPITOR) 20 MG tablet Take 1 tablet by mouth Daily. 90 tablet 1    Blood Glucose Monitoring Suppl (Blood Glucose Monitor System) w/Device kit Check blood sugars tid 1 each 0    cetirizine (zyrTEC) 10 MG tablet Take 1 tablet by mouth Daily. 90 tablet 3    Cyanocobalamin (B-12) 1000 MCG capsule Take  by mouth.      cyclobenzaprine (FLEXERIL) 10 MG tablet TAKE 1 TABLET BY MOUTH 3 TIMES A DAY AS NEEDED FOR MUSCLE SPASMS. 90 tablet 1    Diclofenac Sodium (VOLTAREN) 1 % gel gel Apply 4 g topically to the appropriate area as directed 4 (Four) Times a Day As Needed (muscle pain). 50 g 6    diphenhydrAMINE HCl (BENADRYL ALLERGY PO) Take  by mouth.      gabapentin (NEURONTIN) 600 MG tablet Take 1 tablet by mouth 3 (Three) Times a Day for 180 days. 270 tablet 1    glimepiride (AMARYL) 2 MG tablet Take 1 tablet by mouth Daily. 90 tablet 1    " glucose blood test strip 1 each by Other route 3 times a day. Use as instructed 100 each 3    Ibuprofen 3 %, Gabapentin 10 %, Baclofen 2 %, lidocaine 4 %, Ketamine HCl 4 % Apply 1-2 g topically to the appropriate area as directed 3 (Three) to 4 (Four) times daily. 90 g 5    Insulin Pen Needle (B-D UF III MINI PEN NEEDLES) 31G X 5 MM misc Inject 1 each under the skin into the appropriate area as directed See Admin Instructions. Three times daily 100 each 6    Magnesium Oxide -Mg Supplement (MAGnesium-Oxide) 400 (240 Mg) MG tablet Take 1 tablet by mouth twice daily 60 tablet 3    metFORMIN (GLUCOPHAGE) 500 MG tablet Take 1 tablet by mouth 2 (Two) Times a Day With Meals. 60 tablet 2    naloxone (NARCAN) 4 MG/0.1ML nasal spray 1 spray into the nostril(s) as directed by provider As Needed (per overdose). 2 each 2    pantoprazole (PROTONIX) 40 MG EC tablet Take 1 tablet by mouth Daily. 90 tablet 1    phenazopyridine (Pyridium) 100 MG tablet Take 1 tablet by mouth 3 (Three) Times a Day As Needed for Bladder Spasms. 6 tablet 0    polyethylene glycol (MIRALAX) 17 g packet Take 17 g by mouth Daily. 14 each 0    promethazine (PHENERGAN) 12.5 MG tablet 1-2 q6h prn 60 tablet 2    SITagliptin (Januvia) 100 MG tablet Take 1 tablet by mouth Daily. 30 tablet 3    traMADol (ULTRAM) 50 MG tablet Take 1 tablet by mouth Every 8 (Eight) Hours As Needed for Moderate Pain. 90 tablet 2    Triamcinolone Acetonide (Nasacort Allergy 24HR) 55 MCG/ACT nasal inhaler 2 sprays into the nostril(s) as directed by provider Daily. 16.5 g 2    vitamin D (ERGOCALCIFEROL) 1.25 MG (19638 UT) capsule capsule Take 1 capsule by mouth Every 7 (Seven) Days. 12 capsule 1    [DISCONTINUED] lisinopril (PRINIVIL,ZESTRIL) 20 MG tablet Take 1 tablet by mouth Daily. 90 tablet 1     No current facility-administered medications on file prior to visit.       The following portions of the patient's history were reviewed and updated as appropriate: allergies, current  medications, past family history, past medical history, past social history, past surgical history, and problem list.    Review of Systems   Constitutional:  Positive for fatigue.   Musculoskeletal:  Positive for myalgias.   Skin:  Positive for skin lesions (nose).   Neurological:  Positive for numbness.       Objective   Physical Exam  Vitals and nursing note reviewed.   Constitutional:       Appearance: Normal appearance. She is well-developed. She is obese.   HENT:      Head: Normocephalic and atraumatic.      Right Ear: External ear normal.      Left Ear: External ear normal.      Nose: Nose normal.     Eyes:      Extraocular Movements: Extraocular movements intact.      Conjunctiva/sclera: Conjunctivae normal.      Pupils: Pupils are equal, round, and reactive to light.   Cardiovascular:      Rate and Rhythm: Normal rate and regular rhythm.      Pulses:           Dorsalis pedis pulses are 1+ on the right side and 1+ on the left side.        Posterior tibial pulses are 1+ on the right side and 1+ on the left side.      Heart sounds: Normal heart sounds.   Pulmonary:      Effort: Pulmonary effort is normal.      Breath sounds: Normal breath sounds.   Abdominal:      General: Bowel sounds are normal.      Palpations: Abdomen is soft.   Genitourinary:     Vagina: Normal.   Musculoskeletal:         General: Normal range of motion.      Cervical back: Normal range of motion and neck supple.      Right lower leg: Edema (nonpitting) present.      Left lower leg: Edema (nonpitting) present.   Feet:      Right foot:      Protective Sensation: 10 sites tested.  4 sites sensed.      Skin integrity: Skin integrity normal.      Toenail Condition: Right toenails are normal.      Left foot:      Protective Sensation: 10 sites tested.  4 sites sensed.      Skin integrity: Skin integrity normal.      Toenail Condition: Left toenails are normal.   Skin:     General: Skin is warm and dry.   Neurological:      General: No focal  deficit present.      Mental Status: She is alert and oriented to person, place, and time.   Psychiatric:         Mood and Affect: Mood normal.         Behavior: Behavior normal.         Judgment: Judgment normal.          Neurological: Decreased sensation in bilateral feet, consistent with neuropathy.  Nose: Red lesion on the tip of the nose.  Respiratory: Clear to auscultation, no wheezing, rales or rhonchi.  Cardiovascular: Regular rate and rhythm, no murmurs, rubs, or gallops.  Extremities: Swelling in bilateral feet, more pronounced in the left foot. Presence of varicose veins.  Skin: Red lesion on the tip of the nose. Brennan on top of the left foot due to swelling.    PHQ-9 Total Score:      Results  Labs   - A1c: 7    Diagnostic Testing   - EKG: Showed some changes    Assessment & Plan   Diagnoses and all orders for this visit:    1. Leg swelling (Primary)  -     US Ankle / Brachial Indices Extremity Complete; Future  -     lisinopril-hydrochlorothiazide (Zestoretic) 10-12.5 MG per tablet; Take 1 tablet by mouth Daily.  Dispense: 30 tablet; Refill: 3  -     ECG 12 Lead    2. Encounter for screening mammogram for malignant neoplasm of breast  -     Mammo Screening Digital Tomosynthesis Bilateral With CAD; Future    3. Mixed hyperlipidemia    4. Post-menopausal  -     DEXA Bone Density Axial; Future    5. Type 2 diabetes mellitus with hyperglycemia, without long-term current use of insulin  -     Microalbumin / Creatinine Urine Ratio - Urine, Clean Catch  -     CBC Auto Differential  -     Comprehensive Metabolic Panel  -     Hemoglobin A1c  -     Lipid Panel  -     TSH  -     US Ankle / Brachial Indices Extremity Complete; Future    6. Skin lesion of face  -     Ambulatory Referral to Dermatology         1. Foot swelling.  - Swelling in feet, tingling, and pain have been present for several months, worsening in the last couple of weeks.  - Blood pressure is within normal range; EKG shows slight changes but heart  sounds are normal.  - Vascular study (ANSELMO testing) and lab work ordered to investigate potential peripheral vascular disease and other underlying causes.  - Lisinopril dosage adjusted to 10/12.5 mg with the addition of hydrochlorothiazide; advised to use knee-high compression stockings and monitor sodium intake.    2. Tingling and numbness in feet.  - Tingling and numbness in feet have been present for a few weeks.  - Concerns for neuropathy; last A1c was 7, indicating need for better blood sugar control.  - Increasing the dose of gabapentin and maintaining good blood sugar control recommended to manage neuropathy symptoms.  - Neuropathy unlikely to be the cause of swelling.    3. Dermatological concern.  - Skin lesion on nose has been present for several years, with a new red lesion appearing recently.  - Referral to a dermatologist at [facility] for further evaluation.    4. Health maintenance.  - Due for DEXA scan; plans to check insurance for coverage.  - Due for mammogram in 08/2025; orders for DEXA scan and mammogram placed.  - Follow-up visit scheduled in 3 months.    There are no Patient Instructions on file for this visit.         Patient or patient representative verbalized consent for the use of Ambient Listening during the visit with  YANY Davidson for chart documentation. 7/1/2025  09:05 EDT  Answers submitted by the patient for this visit:  Problem not listed (Submitted on 6/30/2025)  Chief Complaint: Other medical problem  Reason for appointment: Feet pain and places on nose  joint pain: Yes  change in stool: Yes  headaches: Yes  joint swelling: Yes  Onset: 1 to 6 months  Chronicity: new  Frequency: daily

## 2025-07-01 NOTE — PROGRESS NOTES
Procedure     ECG 12 Lead    Date/Time: 7/1/2025 9:20 AM  Performed by: Qiana An APRN    Authorized by: Qiana An APRN  Comparison: compared with previous ECG from 10/16/2023  Rhythm: sinus rhythm  Rate: normal  QRS axis: normal    Clinical impression: abnormal EKG  Comments: Possible right ventricular conduction delay

## 2025-07-02 LAB
ALBUMIN SERPL-MCNC: 4.1 G/DL (ref 3.9–4.9)
ALBUMIN/CREAT UR: 11 MG/G CREAT (ref 0–29)
ALP SERPL-CCNC: 116 IU/L (ref 44–121)
ALT SERPL-CCNC: 18 IU/L (ref 0–32)
AST SERPL-CCNC: 16 IU/L (ref 0–40)
BASOPHILS # BLD AUTO: 0.1 X10E3/UL (ref 0–0.2)
BASOPHILS NFR BLD AUTO: 1 %
BILIRUB SERPL-MCNC: 0.4 MG/DL (ref 0–1.2)
BUN SERPL-MCNC: 14 MG/DL (ref 8–27)
BUN/CREAT SERPL: 14 (ref 12–28)
CALCIUM SERPL-MCNC: 9.4 MG/DL (ref 8.7–10.3)
CHLORIDE SERPL-SCNC: 101 MMOL/L (ref 96–106)
CHOLEST SERPL-MCNC: 116 MG/DL (ref 100–199)
CO2 SERPL-SCNC: 21 MMOL/L (ref 20–29)
CREAT SERPL-MCNC: 1.03 MG/DL (ref 0.57–1)
CREAT UR-MCNC: 53.7 MG/DL
EGFRCR SERPLBLD CKD-EPI 2021: 60 ML/MIN/1.73
EOSINOPHIL # BLD AUTO: 0.1 X10E3/UL (ref 0–0.4)
EOSINOPHIL NFR BLD AUTO: 2 %
ERYTHROCYTE [DISTWIDTH] IN BLOOD BY AUTOMATED COUNT: 13.6 % (ref 11.7–15.4)
GLOBULIN SER CALC-MCNC: 2.5 G/DL (ref 1.5–4.5)
GLUCOSE SERPL-MCNC: 187 MG/DL (ref 70–99)
HBA1C MFR BLD: 7.7 % (ref 4.8–5.6)
HCT VFR BLD AUTO: 47.7 % (ref 34–46.6)
HDLC SERPL-MCNC: 43 MG/DL
HGB BLD-MCNC: 15.2 G/DL (ref 11.1–15.9)
IMM GRANULOCYTES # BLD AUTO: 0 X10E3/UL (ref 0–0.1)
IMM GRANULOCYTES NFR BLD AUTO: 0 %
LDLC SERPL CALC-MCNC: 41 MG/DL (ref 0–99)
LYMPHOCYTES # BLD AUTO: 3 X10E3/UL (ref 0.7–3.1)
LYMPHOCYTES NFR BLD AUTO: 39 %
MCH RBC QN AUTO: 29.5 PG (ref 26.6–33)
MCHC RBC AUTO-ENTMCNC: 31.9 G/DL (ref 31.5–35.7)
MCV RBC AUTO: 93 FL (ref 79–97)
MICROALBUMIN UR-MCNC: 5.8 UG/ML
MONOCYTES # BLD AUTO: 0.5 X10E3/UL (ref 0.1–0.9)
MONOCYTES NFR BLD AUTO: 7 %
NEUTROPHILS # BLD AUTO: 4 X10E3/UL (ref 1.4–7)
NEUTROPHILS NFR BLD AUTO: 51 %
PLATELET # BLD AUTO: 260 X10E3/UL (ref 150–450)
POTASSIUM SERPL-SCNC: 5.4 MMOL/L (ref 3.5–5.2)
PROT SERPL-MCNC: 6.6 G/DL (ref 6–8.5)
RBC # BLD AUTO: 5.15 X10E6/UL (ref 3.77–5.28)
SODIUM SERPL-SCNC: 138 MMOL/L (ref 134–144)
TRIGL SERPL-MCNC: 196 MG/DL (ref 0–149)
TSH SERPL DL<=0.005 MIU/L-ACNC: 2.03 UIU/ML (ref 0.45–4.5)
VLDLC SERPL CALC-MCNC: 32 MG/DL (ref 5–40)
WBC # BLD AUTO: 7.8 X10E3/UL (ref 3.4–10.8)

## 2025-07-11 DIAGNOSIS — M79.89 LEG SWELLING: Primary | ICD-10-CM

## 2025-07-11 DIAGNOSIS — E11.65 TYPE 2 DIABETES MELLITUS WITH HYPERGLYCEMIA, WITHOUT LONG-TERM CURRENT USE OF INSULIN: ICD-10-CM

## 2025-07-15 ENCOUNTER — OFFICE (OUTPATIENT)
Dept: URBAN - METROPOLITAN AREA CLINIC 64 | Facility: CLINIC | Age: 65
End: 2025-07-15
Payer: MEDICARE

## 2025-07-15 VITALS
SYSTOLIC BLOOD PRESSURE: 124 MMHG | WEIGHT: 208 LBS | HEIGHT: 66 IN | HEART RATE: 91 BPM | DIASTOLIC BLOOD PRESSURE: 73 MMHG

## 2025-07-15 DIAGNOSIS — C25.9 MALIGNANT NEOPLASM OF PANCREAS, UNSPECIFIED: ICD-10-CM

## 2025-07-15 DIAGNOSIS — R10.12 LEFT UPPER QUADRANT PAIN: ICD-10-CM

## 2025-07-15 PROCEDURE — 99213 OFFICE O/P EST LOW 20 MIN: CPT | Performed by: NURSE PRACTITIONER

## 2025-07-16 DIAGNOSIS — M79.89 LEG SWELLING: Primary | ICD-10-CM

## 2025-07-16 DIAGNOSIS — E11.65 TYPE 2 DIABETES MELLITUS WITH HYPERGLYCEMIA, WITHOUT LONG-TERM CURRENT USE OF INSULIN: ICD-10-CM

## 2025-07-22 RX ORDER — ATORVASTATIN CALCIUM 20 MG/1
20 TABLET, FILM COATED ORAL DAILY
Qty: 90 TABLET | Refills: 1 | Status: SHIPPED | OUTPATIENT
Start: 2025-07-22

## 2025-07-29 ENCOUNTER — OFFICE VISIT (OUTPATIENT)
Dept: FAMILY MEDICINE CLINIC | Facility: CLINIC | Age: 65
End: 2025-07-29
Payer: MEDICARE

## 2025-07-29 VITALS
HEIGHT: 65 IN | DIASTOLIC BLOOD PRESSURE: 70 MMHG | SYSTOLIC BLOOD PRESSURE: 104 MMHG | WEIGHT: 209 LBS | HEART RATE: 84 BPM | OXYGEN SATURATION: 96 % | TEMPERATURE: 97.9 F | BODY MASS INDEX: 34.82 KG/M2

## 2025-07-29 DIAGNOSIS — Z00.00 WELCOME TO MEDICARE PREVENTIVE VISIT: Primary | ICD-10-CM

## 2025-07-29 DIAGNOSIS — M79.89 LEG SWELLING: ICD-10-CM

## 2025-07-29 DIAGNOSIS — E11.65 TYPE 2 DIABETES MELLITUS WITH HYPERGLYCEMIA, WITHOUT LONG-TERM CURRENT USE OF INSULIN: ICD-10-CM

## 2025-07-29 DIAGNOSIS — I10 PRIMARY HYPERTENSION: ICD-10-CM

## 2025-07-29 PROCEDURE — G0402 INITIAL PREVENTIVE EXAM: HCPCS | Performed by: NURSE PRACTITIONER

## 2025-07-29 PROCEDURE — 1160F RVW MEDS BY RX/DR IN RCRD: CPT | Performed by: NURSE PRACTITIONER

## 2025-07-29 PROCEDURE — 3074F SYST BP LT 130 MM HG: CPT | Performed by: NURSE PRACTITIONER

## 2025-07-29 PROCEDURE — 3051F HG A1C>EQUAL 7.0%<8.0%: CPT | Performed by: NURSE PRACTITIONER

## 2025-07-29 PROCEDURE — 3078F DIAST BP <80 MM HG: CPT | Performed by: NURSE PRACTITIONER

## 2025-07-29 PROCEDURE — G0403 EKG FOR INITIAL PREVENT EXAM: HCPCS | Performed by: NURSE PRACTITIONER

## 2025-07-29 PROCEDURE — 1170F FXNL STATUS ASSESSED: CPT | Performed by: NURSE PRACTITIONER

## 2025-07-29 PROCEDURE — 1159F MED LIST DOCD IN RCRD: CPT | Performed by: NURSE PRACTITIONER

## 2025-07-29 PROCEDURE — 1125F AMNT PAIN NOTED PAIN PRSNT: CPT | Performed by: NURSE PRACTITIONER

## 2025-07-29 NOTE — PATIENT INSTRUCTIONS
Advance Directive       Advance directives are legal documents that let you make choices ahead of time about your health care and medical treatment in case you become unable to communicate for yourself. Advance directives are a way for you to communicate your wishes to family, friends, and health care providers. This can help convey your decisions about end-of-life care if you become unable to communicate.  Discussing and writing advance directives should happen over time rather than all at once. Advance directives can be changed depending on your situation and what you want, even after you have signed the advance directives.  If you do not have an advance directive, some states assign family decision makers to act on your behalf based on how closely you are related to them. Each state has its own laws regarding advance directives. You may want to check with your health care provider, , or state representative about the laws in your state. There are different types of advance directives, such as:  Medical power of .  Living will.  Do not resuscitate (DNR) or do not attempt resuscitation (DNAR) order.  Health care proxy and medical power of   A health care proxy, also called a health care agent, is a person who is appointed to make medical decisions for you in cases in which you are unable to make the decisions yourself. Generally, people choose someone they know well and trust to represent their preferences. Make sure to ask this person for an agreement to act as your proxy. A proxy may have to exercise judgment in the event of a medical decision for which your wishes are not known.  A medical power of  is a legal document that names your health care proxy. Depending on the laws in your state, after the document is written, it may also need to be:  Signed.  Notarized.  Dated.  Copied.  Witnessed.  Incorporated into your medical record.  You may also want to appoint someone to manage  your financial affairs in a situation in which you are unable to do so. This is called a durable power of  for finances. It is a separate legal document from the durable power of  for health care. You may choose the same person or someone different from your health care proxy to act as your agent in financial matters.  If you do not appoint a proxy, or if there is a concern that the proxy is not acting in your best interests, a court-appointed guardian may be designated to act on your behalf.  Living will  A living will is a set of instructions documenting your wishes about medical care when you cannot express them yourself. Health care providers should keep a copy of your living will in your medical record. You may want to give a copy to family members or friends. To alert caregivers in case of an emergency, you can place a card in your wallet to let them know that you have a living will and where they can find it. A living will is used if you become:  Terminally ill.  Incapacitated.  Unable to communicate or make decisions.  Items to consider in your living will include:  The use or non-use of life-sustaining equipment, such as dialysis machines and breathing machines (ventilators).  A DNR or DNAR order, which is the instruction not to use cardiopulmonary resuscitation (CPR) if breathing or heartbeat stops.  The use or non-use of tube feeding.  Withholding of food and fluids.  Comfort (palliative) care when the goal becomes comfort rather than a cure.  Organ and tissue donation.  A living will does not give instructions for distributing your money and property if you should pass away. It is recommended that you seek the advice of a  when writing a will. Decisions about taxes, beneficiaries, and asset distribution will be legally binding. This process can relieve your family and friends of any concerns surrounding disputes or questions that may come up about the distribution of your  assets.  DNR or DNAR  A DNR or DNAR order is a request not to have CPR in the event that your heart stops beating or you stop breathing. If a DNR or DNAR order has not been made and shared, a health care provider will try to help any patient whose heart has stopped or who has stopped breathing. If you plan to have surgery, talk with your health care provider about how your DNR or DNAR order will be followed if problems occur.  Summary  Advance directives are the legal documents that allow you to make choices ahead of time about your health care and medical treatment in case you become unable to communicate for yourself.  The process of discussing and writing advance directives should happen over time. You can change the advance directives, even after you have signed them.  Advance directives include DNR or DNAR orders, living parrish, and designating an agent as your medical power of .  This information is not intended to replace advice given to you by your health care provider. Make sure you discuss any questions you have with your health care provider.  Document Released: 03/26/2009 Document Revised: 11/06/2017 Document Reviewed: 11/06/2017  [employer]" Interactive Patient Education © 2019 [employer]

## 2025-07-29 NOTE — PROGRESS NOTES
Subjective   The ABCs of the Annual Wellness Visit  Medicare Wellness Visit      Judy Koehler is a 65 y.o. patient who presents for a Medicare Wellness Visit.    The following portions of the patient's history were reviewed and   updated as appropriate: allergies, current medications, past family history, past medical history, past social history, past surgical history, and problem list.    Compared to one year ago, the patient's physical   health is the same.  Compared to one year ago, the patient's mental   health is the same.    Recent Hospitalizations:  She was not admitted to the hospital during the last year.     Current Medical Providers:  Patient Care Team:  Qiana An APRN as PCP - General (Nurse Practitioner)  Mario Alexander MD as Consulting Physician (General Surgery)    Outpatient Medications Prior to Visit   Medication Sig Dispense Refill    acetaminophen (TYLENOL) 325 MG tablet Take 2 tablets by mouth Every 6 (Six) Hours As Needed for Mild Pain.      atorvastatin (LIPITOR) 20 MG tablet Take 1 tablet by mouth once daily 90 tablet 1    Blood Glucose Monitoring Suppl (Blood Glucose Monitor System) w/Device kit Check blood sugars tid 1 each 0    cetirizine (zyrTEC) 10 MG tablet Take 1 tablet by mouth Daily. 90 tablet 3    Cyanocobalamin (B-12) 1000 MCG capsule Take  by mouth.      cyclobenzaprine (FLEXERIL) 10 MG tablet TAKE 1 TABLET BY MOUTH 3 TIMES A DAY AS NEEDED FOR MUSCLE SPASMS. 90 tablet 1    diphenhydrAMINE HCl (BENADRYL ALLERGY PO) Take  by mouth.      gabapentin (NEURONTIN) 600 MG tablet Take 1 tablet by mouth 3 (Three) Times a Day for 180 days. 270 tablet 1    glimepiride (AMARYL) 2 MG tablet Take 1 tablet by mouth Daily. 90 tablet 1    glucose blood test strip 1 each by Other route 3 times a day. Use as instructed 100 each 3    Ibuprofen 3 %, Gabapentin 10 %, Baclofen 2 %, lidocaine 4 %, Ketamine HCl 4 % Apply 1-2 g topically to the appropriate area as directed 3 (Three) to 4 (Four)  times daily. 90 g 5    Insulin Pen Needle (B-D UF III MINI PEN NEEDLES) 31G X 5 MM misc Inject 1 each under the skin into the appropriate area as directed See Admin Instructions. Three times daily 100 each 6    lisinopril-hydrochlorothiazide (Zestoretic) 10-12.5 MG per tablet Take 1 tablet by mouth Daily. 30 tablet 3    Magnesium Oxide -Mg Supplement (MAGnesium-Oxide) 400 (240 Mg) MG tablet Take 1 tablet by mouth twice daily 60 tablet 3    metFORMIN (GLUCOPHAGE) 500 MG tablet Take 1 tablet by mouth 2 (Two) Times a Day With Meals. 60 tablet 2    naloxone (NARCAN) 4 MG/0.1ML nasal spray 1 spray into the nostril(s) as directed by provider As Needed (per overdose). 2 each 2    pantoprazole (PROTONIX) 40 MG EC tablet Take 1 tablet by mouth Daily. 90 tablet 1    phenazopyridine (Pyridium) 100 MG tablet Take 1 tablet by mouth 3 (Three) Times a Day As Needed for Bladder Spasms. 6 tablet 0    polyethylene glycol (MIRALAX) 17 g packet Take 17 g by mouth Daily. 14 each 0    promethazine (PHENERGAN) 12.5 MG tablet 1-2 q6h prn 60 tablet 2    SITagliptin (Januvia) 100 MG tablet Take 1 tablet by mouth Daily. 30 tablet 3    traMADol (ULTRAM) 50 MG tablet Take 1 tablet by mouth Every 8 (Eight) Hours As Needed for Moderate Pain. 90 tablet 2    vitamin D (ERGOCALCIFEROL) 1.25 MG (84246 UT) capsule capsule Take 1 capsule by mouth Every 7 (Seven) Days. 12 capsule 1    Diclofenac Sodium (VOLTAREN) 1 % gel gel Apply 4 g topically to the appropriate area as directed 4 (Four) Times a Day As Needed (muscle pain). 50 g 6    Triamcinolone Acetonide (Nasacort Allergy 24HR) 55 MCG/ACT nasal inhaler 2 sprays into the nostril(s) as directed by provider Daily. 16.5 g 2     No facility-administered medications prior to visit.     Opioid medication/s are on active medication list.  and I have evaluated her active treatment plan and pain score trends (see table).  Vitals:    07/29/25 0752   PainSc: 6      I have reviewed the chart for potential of  "high risk medication and harmful drug interactions in the elderly.        Aspirin is not on active medication list.  Aspirin use is not indicated based on review of current medical condition/s. Risk of harm outweighs potential benefits.  .    Patient Active Problem List   Diagnosis    Gastroesophageal reflux disease    Hypertension    Migraine    Mixed hyperlipidemia    Plantar fasciitis of left foot    Rupture of hamstring tendon    Type 2 diabetes mellitus without complication, without long-term current use of insulin    Traumatic complete tear of left rotator cuff    Labral tear of long head of left biceps tendon    Tobacco abuse    Other insomnia    Irritable bowel syndrome with both constipation and diarrhea    DJD of left shoulder    S/P reverse total shoulder arthroplasty, left    Drug-induced constipation    Flank strain, initial encounter    Left upper quadrant abdominal pain    Symptomatic cholelithiasis    Right upper quadrant abdominal pain    Orthopedic aftercare    Medication refill    Status post cholecystectomy    Bilateral myopia    Regular astigmatism of both eyes    Age-related cataract of both eyes    Bilateral presbyopia    Post-cholecystectomy syndrome    Renal cyst, acquired, left    Dupuytren's disease of palm with nodules without contracture    Rib pain on left side    Neuroendocrine tumor of pancreas    Encounter for screening mammogram for malignant neoplasm of breast    Right hand pain    Arthritis of right hand    Grieving     Advance Care Planning Advance Directive is not on file.  ACP discussion was held with the patient during this visit. Patient does not have an advance directive, information provided.            Objective   Vitals:    07/29/25 0752   BP: 104/70   BP Location: Right arm   Patient Position: Sitting   Cuff Size: Adult   Pulse: 84   Temp: 97.9 °F (36.6 °C)   TempSrc: Infrared   SpO2: 96%   Weight: 94.8 kg (209 lb)   Height: 165.1 cm (65\")   PainSc: 6        Estimated " "body mass index is 34.78 kg/m² as calculated from the following:    Height as of this encounter: 165.1 cm (65\").    Weight as of this encounter: 94.8 kg (209 lb).    BMI is >= 30 and <35. (Class 1 Obesity). The following options were offered after discussion;: exercise counseling/recommendations and nutrition counseling/recommendations           Gait and Balance Evaluation:  Normal    Does the patient have evidence of cognitive impairment? No  Lab Results   Component Value Date    CHLPL 116 2025    TRIG 196 (H) 2025    HDL 43 2025    LDL 41 2025    VLDL 32 2025    HGBA1C 7.7 (H) 2025       ECG 12 Lead    Date/Time: 2025 8:30 AM  Performed by: Qiana An APRN    Authorized by: Qiana An APRN  Comparison: compared with previous ECG from 10/20/2023  Similar to previous ECG  Rhythm: sinus rhythm  Rate: normal  QRS axis: normal    Clinical impression: normal ECG  Comments: Possible right ventricular conduction delay                                                                                                Health  Risk Assessment    Smoking Status:  Social History     Tobacco Use   Smoking Status Every Day    Current packs/day: 1.00    Average packs/day: 1 pack/day for 30.6 years (30.6 ttl pk-yrs)    Types: Cigarettes    Start date: 1995    Passive exposure: Current   Smokeless Tobacco Never     Alcohol Consumption:  Social History     Substance and Sexual Activity   Alcohol Use Never       Fall Risk Screen  STEADI Fall Risk Assessment was completed, and patient is at LOW risk for falls.Assessment completed on:2025    Depression Screening   Little interest or pleasure in doing things? Not at all   Feeling down, depressed, or hopeless? Not at all   PHQ-2 Total Score 0      Health Habits and Functional and Cognitive Screenin/29/2025     7:55 AM   Functional & Cognitive Status   Do you have difficulty preparing food and eating? No   Do you have " difficulty bathing yourself, getting dressed or grooming yourself? No   Do you have difficulty using the toilet? No   Do you have difficulty moving around from place to place? No   Do you have trouble with steps or getting out of a bed or a chair? Yes   Current Diet Well Balanced Diet   Dental Exam Up to date   Eye Exam Up to date   Exercise (times per week) 4 times per week   Current Exercises Include Gardening   Do you need help using the phone?  No   Are you deaf or do you have serious difficulty hearing?  No   Do you need help to go to places out of walking distance? Yes   Do you need help shopping? No   Do you need help preparing meals?  No   Do you need help with housework?  No   Do you need help with laundry? No   Do you need help taking your medications? No   Do you need help managing money? No   Do you ever drive or ride in a car without wearing a seat belt? No   Have you felt unusual fatigue (could be tiredness), stress, anger or loneliness in the last month? Yes   Who do you live with? Alone   If you need help, do you have trouble finding someone available to you? No   Have you been bothered in the last four weeks by sexual problems? No   Do you have difficulty concentrating, remembering or making decisions? No           Visual Acuity:  Vision Screening    Right eye Left eye Both eyes   Without correction      With correction 20/30 20/30 20/25     Age-appropriate Screening Schedule:  Refer to the list below for future screening recommendations based on patient's age, sex and/or medical conditions. Orders for these recommended tests are listed in the plan section. The patient has been provided with a written plan.    Health Maintenance List  Health Maintenance   Topic Date Due    ZOSTER VACCINE (1 of 2) Never done    ANNUAL WELLNESS VISIT  Never done    TDAP/TD VACCINES (3 - Td or Tdap) 04/19/2023    INFLUENZA VACCINE  10/01/2025    HEMOGLOBIN A1C  01/01/2026    DIABETIC EYE EXAM  04/09/2026    LUNG CANCER  SCREENING  06/05/2026    DIABETIC FOOT EXAM  07/01/2026    LIPID PANEL  07/01/2026    URINE MICROALBUMIN-CREATININE RATIO (uACR)  07/01/2026    MAMMOGRAM  08/16/2026    DXA SCAN  07/07/2027    COLORECTAL CANCER SCREENING  07/19/2028    HEPATITIS C SCREENING  Completed    Pneumococcal Vaccine 50+  Completed    COVID-19 Vaccine  Discontinued                                                                                                                                                CMS Preventative Services Quick Reference  Risk Factors Identified During Encounter  Fall Risk-High or Moderate: Discussed Fall Prevention in the home  Immunizations Discussed/Encouraged: Tdap and Shingrix    The above risks/problems have been discussed with the patient.  Pertinent information has been shared with the patient in the After Visit Summary.  An After Visit Summary and PPPS were made available to the patient.    Follow Up:   Next Medicare Wellness visit to be scheduled in 1 year.         Additional E&M Note during same encounter follows:  Patient has additional, significant, and separately identifiable condition(s)/problem(s) that require work above and beyond the Medicare Wellness Visit     Chief Complaint  Medicare Wellness-subsequent    Subjective    HPI  Judy is also being seen today for an annual adult preventative physical exam.        The patient presents for a Medicare wellness visit.    She reports no significant changes in her physical or mental health compared to the previous year. She has not been hospitalized within the past year and does not take baby aspirin daily. She does not have an advanced directive or living will in place. Efforts to improve her diet and exercise routine include walking around her house and purchasing low-carb bread. She reports no memory issues or falls at home. An eye exam was completed approximately 3 months ago when she got her new glasses. Her feet have not been examined this year.  "An upcoming ANSELMO test is scheduled, along with a mammogram on the same day. She reports no chest pain or shortness of breath and does not require any medication refills at this time.    She experiences numbness in her feet at night when lying down and is currently taking gabapentin three times a day without any issues. She was previously on Ozempic but discontinued it due to cost. Current medications include glimepiride, metformin, and Januvia 100 mg.    She is on lisinopril and hydrochlorothiazide but has not noticed any significant changes, although she reports less swelling in her feet recently. No episodes of low blood pressure, dizziness, or lightheadedness have been noted.    Social History:  Diet: She has been purchasing low-carb bread and wraps.  Tobacco: She smokes cigarettes.          Objective   Vital Signs:  /70 (BP Location: Right arm, Patient Position: Sitting, Cuff Size: Adult)   Pulse 84   Temp 97.9 °F (36.6 °C) (Infrared)   Ht 165.1 cm (65\")   Wt 94.8 kg (209 lb)   SpO2 96%   BMI 34.78 kg/m²   Physical Exam  Vitals and nursing note reviewed.   Constitutional:       Appearance: Normal appearance. She is well-developed. She is obese.   HENT:      Head: Normocephalic and atraumatic.      Right Ear: External ear normal.      Left Ear: External ear normal.      Nose: Nose normal.   Eyes:      Extraocular Movements: Extraocular movements intact.      Pupils: Pupils are equal, round, and reactive to light.   Cardiovascular:      Rate and Rhythm: Normal rate and regular rhythm.      Heart sounds: Normal heart sounds.   Pulmonary:      Effort: Pulmonary effort is normal.      Breath sounds: Normal breath sounds.   Abdominal:      General: Bowel sounds are normal.      Palpations: Abdomen is soft.   Genitourinary:     Vagina: Normal.   Musculoskeletal:         General: Normal range of motion.      Cervical back: Normal range of motion and neck supple.   Skin:     General: Skin is warm and dry. "   Neurological:      General: No focal deficit present.      Mental Status: She is alert and oriented to person, place, and time.   Psychiatric:         Mood and Affect: Mood normal.         Behavior: Behavior normal.         Judgment: Judgment normal.           Respiratory: Clear to auscultation, no wheezing, rales or rhonchi  Gastrointestinal: Soft, no tenderness, no distention, no masses  Extremities: Feet showed no swelling, skin intact, sensation reduced (4 out of 10 on each foot)            Results  Labs   - GFR: Slightly low    Diagnostic Testing   - EKG: No significant changes from the last one           Assessment and Plan      Welcome to Medicare preventive visit    Orders:    ECG 12 Lead    Leg swelling         Type 2 diabetes mellitus with hyperglycemia, without long-term current use of insulin           Primary hypertension                  1. Medicare wellness visit.  - Blood pressure readings are within the normal range.  - EKG results are consistent with previous readings; kidney function is slightly below optimal.  - Due for tetanus and shingles vaccines, which can be administered at the pharmacy; lung cancer screening scheduled for 2026, DEXA scan for 2027, and colonoscopy for 2028.  - Information regarding advanced directives and living parrish will be provided; diabetic foot exam to assess sensation and skin condition will be conducted today.    2. Neuropathy.  - Reports numbness in feet, especially at night.  - Currently on gabapentin 3 times a day and tolerates it well.  - If ANSELMO results are normal, will continue with gabapentin; if abnormal, referral to a vascular specialist will be made.    3. Diabetes mellitus.  - Currently on glimepiride, metformin, and Januvia 100 mg and labs completed 7/1/25 .    4. Hypertension.  - Well controlled on lisinopril and hydrochlorothiazide.  - Reports no significant changes in blood pressure and no symptoms of dizziness or lightheadedness.  - Current regimen  will be maintained as long as kidney function remains stable.    Follow-up: The patient is scheduled for a follow-up visit in 10/2025 for routine checkup and blood work.         Follow Up   Return if symptoms worsen or fail to improve.  Patient was given instructions and counseling regarding her condition or for health maintenance advice. Please see specific information pulled into the AVS if appropriate.  Patient or patient representative verbalized consent for the use of Ambient Listening during the visit with  YANY Davidson for chart documentation. 7/29/2025  08:24 EDT

## 2025-08-04 ENCOUNTER — HOSPITAL ENCOUNTER (OUTPATIENT)
Dept: CARDIOLOGY | Facility: HOSPITAL | Age: 65
Discharge: HOME | End: 2025-08-04
Payer: MEDICARE

## 2025-08-04 ENCOUNTER — HOSPITAL ENCOUNTER (OUTPATIENT)
Dept: MAMMOGRAPHY | Facility: HOSPITAL | Age: 65
Discharge: HOME | End: 2025-08-04
Payer: MEDICARE

## 2025-08-04 DIAGNOSIS — M79.89 LEG SWELLING: ICD-10-CM

## 2025-08-04 DIAGNOSIS — Z12.31 ENCOUNTER FOR SCREENING MAMMOGRAM FOR MALIGNANT NEOPLASM OF BREAST: ICD-10-CM

## 2025-08-04 DIAGNOSIS — E11.65 TYPE 2 DIABETES MELLITUS WITH HYPERGLYCEMIA, WITHOUT LONG-TERM CURRENT USE OF INSULIN: ICD-10-CM

## 2025-08-04 LAB
BH CV LOWER ARTERIAL LEFT ABI RATIO: 1.07
BH CV LOWER ARTERIAL LEFT DORSALIS PEDIS SYS MAX: 125
BH CV LOWER ARTERIAL LEFT GREAT TOE SYS MAX: 100
BH CV LOWER ARTERIAL LEFT POST TIBIAL SYS MAX: 120
BH CV LOWER ARTERIAL LEFT TBI RATIO: 0.85
BH CV LOWER ARTERIAL RIGHT ABI RATIO: 1.09
BH CV LOWER ARTERIAL RIGHT DORSALIS PEDIS SYS MAX: 127
BH CV LOWER ARTERIAL RIGHT GREAT TOE SYS MAX: 96
BH CV LOWER ARTERIAL RIGHT POST TIBIAL SYS MAX: 122
BH CV LOWER ARTERIAL RIGHT TBI RATIO: 0.82
UPPER ARTERIAL LEFT ARM BRACHIAL SYS MAX: 112
UPPER ARTERIAL RIGHT ARM BRACHIAL SYS MAX: 117

## 2025-08-04 PROCEDURE — 93922 UPR/L XTREMITY ART 2 LEVELS: CPT

## 2025-08-04 PROCEDURE — 93922 UPR/L XTREMITY ART 2 LEVELS: CPT | Performed by: SURGERY

## 2025-08-04 PROCEDURE — 77067 SCR MAMMO BI INCL CAD: CPT

## 2025-08-04 PROCEDURE — 77063 BREAST TOMOSYNTHESIS BI: CPT

## 2025-08-12 ENCOUNTER — OFFICE VISIT (OUTPATIENT)
Dept: FAMILY MEDICINE CLINIC | Facility: CLINIC | Age: 65
End: 2025-08-12
Payer: MEDICARE

## 2025-08-12 VITALS
TEMPERATURE: 97.3 F | HEIGHT: 65 IN | DIASTOLIC BLOOD PRESSURE: 69 MMHG | RESPIRATION RATE: 16 BRPM | SYSTOLIC BLOOD PRESSURE: 110 MMHG | BODY MASS INDEX: 34.75 KG/M2 | HEART RATE: 95 BPM | WEIGHT: 208.6 LBS | OXYGEN SATURATION: 96 %

## 2025-08-12 DIAGNOSIS — R30.0 DYSURIA: Primary | ICD-10-CM

## 2025-08-12 DIAGNOSIS — N39.0 ACUTE URINARY TRACT INFECTION: ICD-10-CM

## 2025-08-12 LAB
BILIRUB BLD-MCNC: NEGATIVE MG/DL
CLARITY, POC: CLEAR
COLOR UR: YELLOW
EXPIRATION DATE: NORMAL
GLUCOSE UR STRIP-MCNC: NEGATIVE MG/DL
KETONES UR QL: NEGATIVE
LEUKOCYTE EST, POC: NEGATIVE
Lab: NORMAL
NITRITE UR-MCNC: NEGATIVE MG/ML
PH UR: 6 [PH] (ref 5–8)
PROT UR STRIP-MCNC: NEGATIVE MG/DL
RBC # UR STRIP: NEGATIVE /UL
SP GR UR: 1.01 (ref 1–1.03)
UROBILINOGEN UR QL: NORMAL

## 2025-08-12 PROCEDURE — 3051F HG A1C>EQUAL 7.0%<8.0%: CPT | Performed by: NURSE PRACTITIONER

## 2025-08-12 PROCEDURE — 3074F SYST BP LT 130 MM HG: CPT | Performed by: NURSE PRACTITIONER

## 2025-08-12 PROCEDURE — 3078F DIAST BP <80 MM HG: CPT | Performed by: NURSE PRACTITIONER

## 2025-08-12 PROCEDURE — 99213 OFFICE O/P EST LOW 20 MIN: CPT | Performed by: NURSE PRACTITIONER

## 2025-08-12 PROCEDURE — 1125F AMNT PAIN NOTED PAIN PRSNT: CPT | Performed by: NURSE PRACTITIONER

## 2025-08-12 PROCEDURE — 81003 URINALYSIS AUTO W/O SCOPE: CPT | Performed by: NURSE PRACTITIONER

## 2025-08-12 RX ORDER — CEPHALEXIN 500 MG/1
500 CAPSULE ORAL 2 TIMES DAILY
Qty: 14 CAPSULE | Refills: 0 | Status: SHIPPED | OUTPATIENT
Start: 2025-08-12 | End: 2025-08-19

## 2025-08-12 RX ORDER — PHENAZOPYRIDINE HYDROCHLORIDE 100 MG/1
100 TABLET, FILM COATED ORAL 3 TIMES DAILY PRN
Qty: 9 TABLET | Refills: 0 | Status: SHIPPED | OUTPATIENT
Start: 2025-08-12

## 2025-08-16 LAB
BACTERIA UR CULT: ABNORMAL
BACTERIA UR CULT: ABNORMAL
OTHER ANTIBIOTIC SUSC ISLT: ABNORMAL

## (undated) DEVICE — ENDOSCOPIC ULTRASOUND FINE NEEDLE BIOPSY (FNB) DEVICE: Brand: ACQUIRE™ S

## (undated) DEVICE — DRSNG SURESITE WNDW 4X4.5

## (undated) DEVICE — BLADELESS OBTURATOR: Brand: WECK VISTA

## (undated) DEVICE — Device

## (undated) DEVICE — SYS BIOP SHARKCORE FNB W/NDL 25G

## (undated) DEVICE — PASS SUT PRO BARIATRIC XL W/TROC SWABS

## (undated) DEVICE — ANTIBACTERIAL UNDYED BRAIDED (POLYGLACTIN 910), SYNTHETIC ABSORBABLE SUTURE: Brand: COATED VICRYL

## (undated) DEVICE — MARKR SKIN W/RULR

## (undated) DEVICE — TROC BLADLES AIRSEAL/OPTI THRD 8X120MM 1P/U

## (undated) DEVICE — UNDERGLV SURG BIOGEL INDICAT PI SZ8 BLU

## (undated) DEVICE — BLANKT WARM UPPR/BDY ARM/OUT 57X196CM

## (undated) DEVICE — GENERAL LAPAROSCOPY CDS: Brand: MEDLINE INDUSTRIES, INC.

## (undated) DEVICE — ARM DRAPE

## (undated) DEVICE — POSTN HD UNIV DISP

## (undated) DEVICE — CVR HNDL LT SURG ACCSSRY BLU STRL

## (undated) DEVICE — BITEBLOCK ENDO W/STRAP 60F A/ LF DISP

## (undated) DEVICE — COLUMN DRAPE

## (undated) DEVICE — KT SURG TURNOVER 050

## (undated) DEVICE — DECANTER: Brand: UNBRANDED

## (undated) DEVICE — DUAL CUT SAGITTAL BLADE

## (undated) DEVICE — GLV SURG SENSICARE PI ORTHO SZ8.5 LF STRL

## (undated) DEVICE — BG RETRV TISS SUPERBAG INTRO RIP/STOP NLY 10MM 240ML MD

## (undated) DEVICE — REDUCER: Brand: ENDOWRIST

## (undated) DEVICE — ST TBG AIRSEAL BIF FLTR W/ACT/CHARCOAL/FLTR

## (undated) DEVICE — PK ENDO GI 50

## (undated) DEVICE — SOLUTION,WATER,IRRIGATION,1000ML,STERILE: Brand: MEDLINE

## (undated) DEVICE — GLV SURG SIGNATURE ESSENTIAL PF LTX SZ8.5

## (undated) DEVICE — ADHS SKIN PREMIERPRO EXOFIN TOPICAL HI/VISC .5ML

## (undated) DEVICE — TIP COVER ACCESSORY

## (undated) DEVICE — UNDERGLV SURG BIOGEL INDICATOR LF PF 7.5

## (undated) DEVICE — PK TOTL SHLDR 50

## (undated) DEVICE — GLV SURG BIOGEL LTX PF 7

## (undated) DEVICE — T-MAX DISPOSABLE FACE MASK 8 PER BOX

## (undated) DEVICE — GLV SURG SENSICARE PI ORTHO SZ7.5 LF STRL

## (undated) DEVICE — SOL IRRIG NACL 1000ML

## (undated) DEVICE — GLV SURG SENSICARE PI ORTHO SZ8 LF STRL

## (undated) DEVICE — ENDOPATH XCEL WITH OPTIVIEW TECHNOLOGY BLADELESS TROCARS WITH STABILITY SLEEVES: Brand: ENDOPATH XCEL OPTIVIEW

## (undated) DEVICE — FAN SPRAY KIT: Brand: PULSAVAC®

## (undated) DEVICE — CANNULA SEAL

## (undated) DEVICE — SUT MONOCRYL PLS ANTIB UND 3/0  PS1 27IN

## (undated) DEVICE — SUT VIC 0 UR6 27IN VCP603H

## (undated) DEVICE — SOL IRR NACL 0.9PCT ARTHROMATIC 3000ML

## (undated) DEVICE — SLV SCD CALF HEMOFORCE DVT THERP REPROC MD

## (undated) DEVICE — SYR LUERLOK 30CC

## (undated) DEVICE — WRAP SHOULDER COLD THERAPY

## (undated) DEVICE — SUCTION IRRIGATOR: Brand: ENDOWRIST

## (undated) DEVICE — UNDERGLV SURG BIOGEL/PI PF SYNTH SURG SZ8.5 BLU 50/BX

## (undated) DEVICE — TOWEL,OR,DSP,ST,WHITE,DLX,4/PK,20PK/CS: Brand: MEDLINE

## (undated) DEVICE — LAPAROVUE VISIBILITY SYSTEM LAPAROSCOPIC SOLUTIONS: Brand: LAPAROVUE

## (undated) DEVICE — NDL SURG MAYO CATGUT 1/2CIR TPR SS SZ6 2PK REUS

## (undated) DEVICE — BALN ENDO FOR EG/3630UR

## (undated) DEVICE — SEAL